# Patient Record
Sex: FEMALE | Race: WHITE | NOT HISPANIC OR LATINO | Employment: OTHER | URBAN - METROPOLITAN AREA
[De-identification: names, ages, dates, MRNs, and addresses within clinical notes are randomized per-mention and may not be internally consistent; named-entity substitution may affect disease eponyms.]

---

## 2017-08-02 ENCOUNTER — TELEPHONE (OUTPATIENT)
Dept: PRIMARY CARE CLINIC | Facility: CLINIC | Age: 67
End: 2017-08-02

## 2017-08-02 DIAGNOSIS — N39.0 URINARY TRACT INFECTION WITHOUT HEMATURIA, SITE UNSPECIFIED: Primary | ICD-10-CM

## 2017-08-02 NOTE — TELEPHONE ENCOUNTER
----- Message from Keyonna Garcia sent at 8/2/2017  2:53 PM CDT -----  Contact: self [phone]  Patient needs an appointment this week for uti.  Thanks!

## 2017-08-02 NOTE — TELEPHONE ENCOUNTER
No available appointment for this week would you like us to double book an appointment or can you order a UA

## 2017-08-03 NOTE — TELEPHONE ENCOUNTER
----- Message from Bruna Mcnamara sent at 8/3/2017  3:18 PM CDT -----  Contact: self  Patient [phone] is calling to check the status of her urinalysis and asking if a medication has been called to her pharmacy/please advise

## 2017-08-03 NOTE — TELEPHONE ENCOUNTER
Spoke with pt, notified her to go to lab at hospital for u/a and if she has a uti dr. rincon will call her in meds. States her understanding.

## 2017-08-03 NOTE — TELEPHONE ENCOUNTER
Called lab at [facility], waiting to receive lab results of u/a through fax.

## 2017-08-04 ENCOUNTER — TELEPHONE (OUTPATIENT)
Dept: PRIMARY CARE CLINIC | Facility: CLINIC | Age: 67
End: 2017-08-04

## 2017-08-04 RX ORDER — SULFAMETHOXAZOLE AND TRIMETHOPRIM 800; 160 MG/1; MG/1
1 TABLET ORAL 2 TIMES DAILY
COMMUNITY
End: 2017-08-04 | Stop reason: DRUGHIGH

## 2017-08-04 RX ORDER — SULFAMETHOXAZOLE AND TRIMETHOPRIM 800; 160 MG/1; MG/1
1 TABLET ORAL 2 TIMES DAILY
Qty: 20 TABLET | Refills: 0 | Status: SHIPPED | OUTPATIENT
Start: 2017-08-04 | End: 2017-12-12

## 2017-08-04 NOTE — TELEPHONE ENCOUNTER
Received lab results from [facility], Pt has uti per Dr. Meehan. Bactrim DS sent to pharmacy. To repeat urine in 2 weeks. Notified pt, states her understanding

## 2017-08-04 NOTE — TELEPHONE ENCOUNTER
----- Message from Sena Mccoy sent at 8/3/2017  9:41 AM CDT -----  Contact: Patient  Alexandria, patient [phone], Second call for patient, possible UTI, wanting same day appointment. Pressure, burning and odor.  Please advise. Thanks.      [facility]/pharmacy #[id number] - [place] - [address]  [address]  [place] [address]  Phone: [phone] Fax: [phone]    
Duplicate messages, pt was sent to [facility] for UA ,UA reviewed by Dr Meehan and meds sent to pharm ref to previous message   
abnormal/expand...

## 2017-08-04 NOTE — TELEPHONE ENCOUNTER
----- Message from Keyonna Garcia sent at 8/2/2017  2:53 PM CDT -----  Contact: self [phone]  Patient needs an appointment this week for uti.  Thanks!

## 2017-08-16 ENCOUNTER — TELEPHONE (OUTPATIENT)
Dept: PRIMARY CARE CLINIC | Facility: CLINIC | Age: 67
End: 2017-08-16

## 2017-08-16 NOTE — TELEPHONE ENCOUNTER
Spoke with pt, advised her I will pt a new order at  to be picked up. States her understanding

## 2017-08-16 NOTE — TELEPHONE ENCOUNTER
----- Message from Teresa Russell sent at 8/16/2017  8:27 AM CDT -----  Patient lost her orders for a urinalysis, she is requesting a new one  Contact patient  [phone].    Thank you

## 2017-08-28 NOTE — TELEPHONE ENCOUNTER
----- Message from Kingston Kramer sent at 8/28/2017  2:20 PM CDT -----  Contact: pt  Pt is requesting a refill on her Lisinopril   Call Back#[phone]  Thanks    [facility]/pharmacy #[id number] - [place] - [address]  [address] [place] [address]  Phone: [phone] Fax: [phone]

## 2017-08-29 RX ORDER — LISINOPRIL 20 MG/1
20 TABLET ORAL DAILY
Qty: 30 TABLET | Refills: 5 | Status: SHIPPED | OUTPATIENT
Start: 2017-08-29 | End: 2017-09-06 | Stop reason: SDUPTHER

## 2017-08-29 NOTE — TELEPHONE ENCOUNTER
Call tell last lab done 8-31-16 , last seen 3-7-17 Do lab CBC,CMP,lipid T4,TSH U/A do lab 3 mo see me 2 weeks later review lab get in EPIC

## 2017-09-07 RX ORDER — LISINOPRIL 20 MG/1
TABLET ORAL
Qty: 30 TABLET | Refills: 2 | Status: SHIPPED | OUTPATIENT
Start: 2017-09-07 | End: 2018-01-15 | Stop reason: SDUPTHER

## 2017-09-07 NOTE — TELEPHONE ENCOUNTER
Call tell last lab 8-31-16 needs do lab CBC,CMP,Lipid,TSH,U/A do 2-3 mo see me 2 weeks later go over lab get in EPIC

## 2017-12-12 ENCOUNTER — OFFICE VISIT (OUTPATIENT)
Dept: PRIMARY CARE CLINIC | Facility: CLINIC | Age: 67
End: 2017-12-12
Payer: COMMERCIAL

## 2017-12-12 VITALS
HEIGHT: 67 IN | TEMPERATURE: 98 F | BODY MASS INDEX: 19.78 KG/M2 | DIASTOLIC BLOOD PRESSURE: 80 MMHG | HEART RATE: 71 BPM | RESPIRATION RATE: 18 BRPM | OXYGEN SATURATION: 96 % | SYSTOLIC BLOOD PRESSURE: 148 MMHG | WEIGHT: 126 LBS

## 2017-12-12 DIAGNOSIS — M41.9 SCOLIOSIS, UNSPECIFIED SCOLIOSIS TYPE, UNSPECIFIED SPINAL REGION: ICD-10-CM

## 2017-12-12 DIAGNOSIS — S39.012A LUMBOSACRAL STRAIN, INITIAL ENCOUNTER: Primary | ICD-10-CM

## 2017-12-12 DIAGNOSIS — I10 HTN (HYPERTENSION), BENIGN: ICD-10-CM

## 2017-12-12 DIAGNOSIS — M54.30 SCIATICA, UNSPECIFIED LATERALITY: ICD-10-CM

## 2017-12-12 PROCEDURE — 99213 OFFICE O/P EST LOW 20 MIN: CPT | Mod: 25,S$GLB,, | Performed by: FAMILY MEDICINE

## 2017-12-12 PROCEDURE — 96372 THER/PROPH/DIAG INJ SC/IM: CPT | Mod: S$GLB,,, | Performed by: FAMILY MEDICINE

## 2017-12-12 PROCEDURE — 99999 PR PBB SHADOW E&M-EST. PATIENT-LVL IV: CPT | Mod: PBBFAC,,, | Performed by: FAMILY MEDICINE

## 2017-12-12 RX ORDER — FAMOTIDINE 10 MG/1
10 TABLET ORAL 2 TIMES DAILY
COMMUNITY
End: 2018-04-23 | Stop reason: ALTCHOICE

## 2017-12-12 RX ORDER — OXYBUTYNIN CHLORIDE 10 MG/1
10 TABLET, EXTENDED RELEASE ORAL DAILY
COMMUNITY
Start: 2017-11-27 | End: 2018-01-15 | Stop reason: SDUPTHER

## 2017-12-12 RX ORDER — METHYLPREDNISOLONE 4 MG/1
TABLET ORAL
Qty: 1 PACKAGE | Refills: 0 | Status: SHIPPED | OUTPATIENT
Start: 2017-12-12 | End: 2017-12-28

## 2017-12-12 RX ORDER — HYOSCYAMINE SULFATE 0.125 MG
125 TABLET ORAL EVERY 4 HOURS PRN
COMMUNITY
End: 2018-04-23 | Stop reason: ALTCHOICE

## 2017-12-12 RX ORDER — DICLOFENAC SODIUM 75 MG/1
75 TABLET, DELAYED RELEASE ORAL 2 TIMES DAILY
Qty: 30 TABLET | Refills: 5 | Status: SHIPPED | OUTPATIENT
Start: 2017-12-12 | End: 2018-07-19

## 2017-12-12 RX ORDER — HYDROCODONE BITARTRATE AND ACETAMINOPHEN 5; 325 MG/1; MG/1
1 TABLET ORAL EVERY 6 HOURS PRN
Qty: 30 TABLET | Refills: 0 | Status: SHIPPED | OUTPATIENT
Start: 2017-12-12 | End: 2017-12-28 | Stop reason: SDUPTHER

## 2017-12-12 RX ORDER — BETAMETHASONE SODIUM PHOSPHATE AND BETAMETHASONE ACETATE 3; 3 MG/ML; MG/ML
12 INJECTION, SUSPENSION INTRA-ARTICULAR; INTRALESIONAL; INTRAMUSCULAR; SOFT TISSUE
Status: COMPLETED | OUTPATIENT
Start: 2017-12-12 | End: 2017-12-12

## 2017-12-12 RX ADMIN — BETAMETHASONE SODIUM PHOSPHATE AND BETAMETHASONE ACETATE 12 MG: 3; 3 INJECTION, SUSPENSION INTRA-ARTICULAR; INTRALESIONAL; INTRAMUSCULAR; SOFT TISSUE at 01:12

## 2017-12-12 NOTE — PROGRESS NOTES
Subjective:       Patient ID: Alexandria Estrada is a 67 y.o. female.    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  Moved to table last Thursday about 10-11 days.  Pain ever since--increased bending and squatting.  History of bad scoliosis hazily has some pain.  History of unhealed fracture 3 years ago fell on left knee and had a hematoma 2 years    ROS:  Skin: no psoriasis, eczema, skin cancer  HEENT: No headache, ocular pain, blurred vision, diplopia, epistaxis, hoarseness change in voice, thyroid trouble  Lung: No pneumonia, asthma, Tb, wheezing, SOB, history of mucus in the throat for approximately one year   Heart: No chest pain, ankle edema, palpitations, MI, elaine murmur,+ hypertension,no hyperlipidemia  Abdomen: No nausea, vomiting, diarrhea, constipation, ulcers, hepatitis, gallbladder disease, melena, hematochezia, hematemesis  : no UTI, renal disease,+  stones  GYN neg   MS: See history of present illness   Neuro: No dizziness, LOC, seizures   No diabetes, no anemia, no anxiety, no depression     Objective:   Physical Exam:  General: Well nourished, well developed, no acute distress severe scoliosis pain with any movement side to side up and down comfortable with sitting still   Skin: No lesions  HEENT: Eyes PERRLA, EOM intact, nose patent, throat non-erythematous   NECK: Supple, no bruits, No JVD, no nodes  Lungs: Clear, no rales, rhonchi, wheezing  Heart: Regular rate and rhythm, no murmurs, gallops, or rubs  Abdomen: flat, bowel sounds positive, no tenderness, or organomegaly  MS: Tenderness lumbar spine L1-S1 on the right and SI area pain with anterior flexion 20° extension 10° lateral flexion and rotation 10° severe scoliosis  Neuro: Alert, CN intact, oriented X 3  Extremities: No cyanosis, clubbing, or edema         Assessment:       1. Lumbosacral strain, initial  encounter    2. Sciatica, unspecified laterality    3. HTN (hypertension), benign    4. Scoliosis, unspecified scoliosis type, unspecified spinal region        Plan:       Lumbosacral strain, initial encounter    Sciatica, unspecified laterality    HTN (hypertension), benign    Scoliosis, unspecified scoliosis type, unspecified spinal region    Other orders  -     betamethasone acetate-betamethasone sodium phosphate injection 12 mg; Inject 2 mLs (12 mg total) into the muscle one time.  -     hydrocodone-acetaminophen 5-325mg (NORCO) 5-325 mg per tablet; Take 1 tablet by mouth every 6 (six) hours as needed.  Dispense: 30 tablet; Refill: 0  -     methylPREDNISolone (MEDROL DOSEPACK) 4 mg tablet; use as directed  Dispense: 1 Package; Refill: 0  -     diclofenac (VOLTAREN) 75 MG EC tablet; Take 1 tablet (75 mg total) by mouth 2 (two) times daily.  Dispense: 30 tablet; Refill: 5      moist heat/Theragesic/range of motion exercise/Celestone/norco/Medrol Dosepak/diclofenac--if needed and Flexeril 5 mg  X-ray lumbar spine hip pain stays needs MRI is severe scoliosis may need to see orthopedist or neurosurgeon more like if pain persists

## 2017-12-28 ENCOUNTER — OFFICE VISIT (OUTPATIENT)
Dept: PRIMARY CARE CLINIC | Facility: CLINIC | Age: 67
End: 2017-12-28
Payer: COMMERCIAL

## 2017-12-28 VITALS
WEIGHT: 129 LBS | TEMPERATURE: 98 F | HEART RATE: 70 BPM | OXYGEN SATURATION: 97 % | RESPIRATION RATE: 18 BRPM | BODY MASS INDEX: 20.25 KG/M2 | SYSTOLIC BLOOD PRESSURE: 137 MMHG | HEIGHT: 67 IN | DIASTOLIC BLOOD PRESSURE: 84 MMHG

## 2017-12-28 DIAGNOSIS — M54.30 SCIATICA, UNSPECIFIED LATERALITY: ICD-10-CM

## 2017-12-28 DIAGNOSIS — I10 HTN (HYPERTENSION), BENIGN: ICD-10-CM

## 2017-12-28 DIAGNOSIS — M41.9 SCOLIOSIS, UNSPECIFIED SCOLIOSIS TYPE, UNSPECIFIED SPINAL REGION: ICD-10-CM

## 2017-12-28 DIAGNOSIS — S39.012A LUMBOSACRAL STRAIN, INITIAL ENCOUNTER: Primary | ICD-10-CM

## 2017-12-28 PROCEDURE — 99213 OFFICE O/P EST LOW 20 MIN: CPT | Mod: S$GLB,,, | Performed by: FAMILY MEDICINE

## 2017-12-28 PROCEDURE — 99999 PR PBB SHADOW E&M-EST. PATIENT-LVL IV: CPT | Mod: PBBFAC,,, | Performed by: FAMILY MEDICINE

## 2017-12-28 RX ORDER — HYDROCODONE BITARTRATE AND ACETAMINOPHEN 5; 325 MG/1; MG/1
1 TABLET ORAL EVERY 12 HOURS PRN
Qty: 30 TABLET | Refills: 0 | Status: SHIPPED | OUTPATIENT
Start: 2017-12-28 | End: 2018-04-23 | Stop reason: ALTCHOICE

## 2017-12-28 RX ORDER — CYCLOBENZAPRINE HCL 10 MG
10 TABLET ORAL 3 TIMES DAILY PRN
Qty: 30 TABLET | Refills: 5 | Status: SHIPPED | OUTPATIENT
Start: 2017-12-28 | End: 2018-01-07

## 2017-12-28 RX ORDER — IBUPROFEN 600 MG/1
600 TABLET ORAL EVERY 6 HOURS PRN
Qty: 100 TABLET | Refills: 2 | Status: SHIPPED | OUTPATIENT
Start: 2017-12-28 | End: 2018-12-21

## 2017-12-28 NOTE — PROGRESS NOTES
Subjective:       Patient ID: Alexandria Estrada is a 67 y.o. female.    Chief Complaint: Follow-up (Back pain)    HPI: 67-year-old white female in for back pain--- started right before Tessy--patient was moving a table--pain started immediately.  Was given pain medications but no significant relief, Celestone, Medrol pack and hydrocodone--no NSAIDs or muscle relaxant.  No x-ray taken pain worse is first gets up in the morning almost unable to move.  As takes a pain pill able to go through the day.  Pain in the hands left squad surf reaches far with right arm pain in the sacroiliac and lumbosacral area on the right    ROS:  Skin: no psoriasis, eczema, skin cancer  HEENT: No headache, ocular pain, blurred vision, diplopia, epistaxis, hoarseness change in voice, thyroid trouble  Lung: No pneumonia, asthma, Tb, wheezing, SOB,  Heart: No chest pain, ankle edema, palpitations, MI, elaine murmur,+ hypertension,no  hyperlipidemia  Abdomen: No nausea, vomiting, diarrhea, constipation, ulcers, hepatitis, gallbladder disease, melena, hematochezia, hematemesis  : no UTI, renal disease, stones + overactive bladder  GYN mammogram last year due to this year had Pap smear 2 years ago  MS: + fracture---crushed left heel and injured left knee--falling holding her baby while walking down steps--4 years ago,+ O/A does have intermittent issues with knee, no  lupus, rheumatoid, gout  Neuro: No dizziness, LOC, seizures   No diabetes, no anemia, no anxiety, no depression     Objective:   Physical Exam:  General: Well nourished, well developed, no acute distress severe kyphoscolisos   Skin: No lesions  HEENT: Eyes PERRLA, EOM intact, nose patent, throat non-erythematous   NECK: Supple, no bruits, No JVD, no nodes  Lungs: Clear, no rales, rhonchi, wheezing  Heart: Regular rate and rhythm, no murmurs, gallops, or rubs  Abdomen: flat, bowel sounds positive, no tenderness, or organomegaly  MS: signif scoliosis -- pain right L1-S1 and  right SI joint , sore ant flex 10, ext 10, lat flex and rotation 10, SLL pulling, painful palpation. ---knees sore m right heel sore   Neuro: Alert, CN intact, oriented X 3  Extremities: No cyanosis, clubbing, or edema         Assessment:       1. Lumbosacral strain, initial encounter    2. Sciatica, unspecified laterality    3. Scoliosis, unspecified scoliosis type, unspecified spinal region    4. HTN (hypertension), benign        Plan:       Lumbosacral strain, initial encounter  -     X-Ray Lumbar Spine Complete 5 View; Future; Expected date: 12/28/2017    Sciatica, unspecified laterality  -     X-Ray Lumbar Spine Complete 5 View; Future; Expected date: 12/28/2017    Scoliosis, unspecified scoliosis type, unspecified spinal region  -     X-Ray Lumbar Spine Complete 5 View; Future; Expected date: 12/28/2017    HTN (hypertension), benign    Other orders  -     hydrocodone-acetaminophen 5-325mg (NORCO) 5-325 mg per tablet; Take 1 tablet by mouth every 12 (twelve) hours as needed.  Dispense: 30 tablet; Refill: 0  -     ibuprofen (ADVIL,MOTRIN) 600 MG tablet; Take 1 tablet (600 mg total) by mouth every 6 (six) hours as needed for Pain.  Dispense: 100 tablet; Refill: 2  -     cyclobenzaprine (FLEXERIL) 10 MG tablet; Take 1 tablet (10 mg total) by mouth 3 (three) times daily as needed for Muscle spasms.  Dispense: 30 tablet; Refill: 5      moist heat, Thera-Gesic, range of motion exercise, Motrin 601 by mouth every 6 hours when necessary pain, Flexeril 10 mg 1 by mouth every 8 hours p.m. muscle spasm can use daily at bedtime only if to sleepy  X-ray lumbar spine if abnormal consider MRI lumbar spine and consult with either orthopedics or neurosurgeon  Use hydrocodone only when necessary half to 1 by mouth twice a day  Will consider orthopedic consult/physical therapy with range of motion exercises and MRI lumbar spine not better in 1 month

## 2018-01-03 ENCOUNTER — TELEPHONE (OUTPATIENT)
Dept: PRIMARY CARE CLINIC | Facility: CLINIC | Age: 68
End: 2018-01-03

## 2018-01-03 NOTE — TELEPHONE ENCOUNTER
Spoke to pharmacy and verified that ICD10 code was lumbosacral strain  S39.012A per office visit in patient's chart that rx was prescribed in.

## 2018-01-03 NOTE — TELEPHONE ENCOUNTER
----- Message from Sena Mccoy sent at 1/3/2018 10:54 AM CST -----  Contact: Philomena with [employer] phone [phone]  Philomena with [employer] phone [phone], Second time calling for an ICD10 diagnosis code for Rx hydrocodone-acetaminophen 5-325mg (NORCO) 5-325 mg per tablet 30 tablet . Please advise. Thanks.

## 2018-01-04 ENCOUNTER — TELEPHONE (OUTPATIENT)
Dept: PRIMARY CARE CLINIC | Facility: CLINIC | Age: 68
End: 2018-01-04

## 2018-01-04 NOTE — TELEPHONE ENCOUNTER
Pharmacy needs the diagnosis code changed due to her Norco being for a 30 day supply. Please change. Thank you.

## 2018-01-15 ENCOUNTER — OFFICE VISIT (OUTPATIENT)
Dept: PRIMARY CARE CLINIC | Facility: CLINIC | Age: 68
End: 2018-01-15
Payer: MEDICARE

## 2018-01-15 VITALS
HEART RATE: 79 BPM | RESPIRATION RATE: 18 BRPM | DIASTOLIC BLOOD PRESSURE: 83 MMHG | SYSTOLIC BLOOD PRESSURE: 118 MMHG | TEMPERATURE: 98 F | OXYGEN SATURATION: 97 %

## 2018-01-15 DIAGNOSIS — M20.10 ACQUIRED HALLUX VALGUS, UNSPECIFIED LATERALITY: ICD-10-CM

## 2018-01-15 DIAGNOSIS — I10 HTN (HYPERTENSION), BENIGN: ICD-10-CM

## 2018-01-15 DIAGNOSIS — M54.30 SCIATICA, UNSPECIFIED LATERALITY: ICD-10-CM

## 2018-01-15 DIAGNOSIS — M41.45 NEUROMUSCULAR SCOLIOSIS OF THORACOLUMBAR REGION: ICD-10-CM

## 2018-01-15 DIAGNOSIS — M41.9 SCOLIOSIS, UNSPECIFIED SCOLIOSIS TYPE, UNSPECIFIED SPINAL REGION: Primary | ICD-10-CM

## 2018-01-15 DIAGNOSIS — S39.012A LUMBOSACRAL STRAIN, INITIAL ENCOUNTER: ICD-10-CM

## 2018-01-15 PROCEDURE — 99999 PR PBB SHADOW E&M-EST. PATIENT-LVL III: CPT | Mod: PBBFAC,,, | Performed by: FAMILY MEDICINE

## 2018-01-15 PROCEDURE — 99213 OFFICE O/P EST LOW 20 MIN: CPT | Mod: S$GLB,,, | Performed by: FAMILY MEDICINE

## 2018-01-15 RX ORDER — LISINOPRIL 20 MG/1
20 TABLET ORAL DAILY
Qty: 90 TABLET | Refills: 2 | Status: SHIPPED | OUTPATIENT
Start: 2018-01-15 | End: 2018-04-23 | Stop reason: ALTCHOICE

## 2018-01-15 RX ORDER — OXYBUTYNIN CHLORIDE 10 MG/1
10 TABLET, EXTENDED RELEASE ORAL DAILY
Qty: 90 TABLET | Refills: 2 | Status: SHIPPED | OUTPATIENT
Start: 2018-01-15 | End: 2018-10-15 | Stop reason: SDUPTHER

## 2018-01-15 NOTE — PROGRESS NOTES
Subjective:       Patient ID: Alexandria Estrada is a 67 y.o. female.    Chief Complaint: Follow-up (wellness visit )    HPI: 66 yo WF in for results-- XraY LS spine Severe thoracolumbar scoliosis with spondylosis     ROS:  Skin: no psoriasis, eczema,+ hx  skin cancer 6 month ago burned off   HEENT: No headache, ocular pain, blurred vision, diplopia, epistaxis, hoarseness change in voice, thyroid trouble  Lung: No pneumonia, asthma, Tb, wheezing, SOB,no smoking   Heart: No chest pain, ankle edema, palpitations, MI, elaine murmur,+ hypertension,no  hyperlipidemia  Abdomen: No nausea, vomiting, diarrhea, constipation, ulcers, hepatitis, gallbladder disease, melena, hematochezia, hematemesis  : no UTI, renal disease, +stones past  GYN mammogram due now, next week PAP  MS: no fractures,+O/A with severe scoliosis, no  lupus, rheumatoid, gout + hallux valgus some callouses    Neuro: No dizziness, LOC, seizures   No diabetes, no anemia, no anxiety, no depression vv    Objective:   Physical Exam:  General: Well nourished, well developed, no acute distress + thin severe scoliosis   Skin: No lesions  HEENT: Eyes PERRLA, EOM intact, nose patent, throat non-erythematous   NECK: Supple, no bruits, No JVD, no nodes  Lungs: Clear, no rales, rhonchi, wheezing  Heart: Regular rate and rhythm, no murmurs, gallops, or rubs  Abdomen: flat, bowel sounds positive, no tenderness, or organomegaly  Ms: severe thoracoscolisos sore palaption , sore with lat flex and rotstion, Apin leander left flank   Neuro: Alert, CN intact, oriented X 3  Extremities: No cyanosis, clubbing, or edema         Assessment:       1. Scoliosis, unspecified scoliosis type, unspecified spinal region    2. Neuromuscular scoliosis of thoracolumbar region    3. Lumbosacral strain, initial encounter    4. Sciatica, unspecified laterality    5. HTN (hypertension), benign    6. Acquired hallux valgus, unspecified laterality        Plan:       Scoliosis, unspecified scoliosis  type, unspecified spinal region  -     MRI Lumbar Spine Without Contrast; Future; Expected date: 01/15/2018  -     MRI Thoracic Spine Without Contrast; Future; Expected date: 01/15/2018    Neuromuscular scoliosis of thoracolumbar region    Lumbosacral strain, initial encounter  -     MRI Lumbar Spine Without Contrast; Future; Expected date: 01/15/2018  -     MRI Thoracic Spine Without Contrast; Future; Expected date: 01/15/2018    Sciatica, unspecified laterality  -     MRI Lumbar Spine Without Contrast; Future; Expected date: 01/15/2018  -     MRI Thoracic Spine Without Contrast; Future; Expected date: 01/15/2018    HTN (hypertension), benign  -     CBC auto differential; Future; Expected date: 07/15/2018  -     Comprehensive metabolic panel; Future; Expected date: 07/15/2018  -     Lipid panel; Future; Expected date: 07/15/2018  -     TSH; Future; Expected date: 07/15/2018  -     T4, free; Future; Expected date: 07/15/2018  -     Urinalysis  -     X-Ray Chest PA And Lateral; Future; Expected date: 01/15/2018  -     EKG 12-lead; Future    Acquired hallux valgus, unspecified laterality    Other orders  -     oxybutynin (DITROPAN-XL) 10 MG 24 hr tablet; Take 1 tablet (10 mg total) by mouth once daily.  Dispense: 90 tablet; Refill: 2  -     lisinopril (PRINIVIL,ZESTRIL) 20 MG tablet; Take 1 tablet (20 mg total) by mouth once daily.  Dispense: 90 tablet; Refill: 2      MRI lumbar and thoracic spines-- severe thoracoc lumbar scoliosis -- spondylosis , pain left flank, radicular pain left leg , occas left leg weakness, slight decreased reflexes left, increased SOB due scoliosis . Then see neurosurgeon       Needs lab in 6 mo see me 2 weeks later Due back issues see me 3 mo check back   Moist heat, Theragesic,rOM exercises NSAID's pain meds prn

## 2018-02-14 ENCOUNTER — TELEPHONE (OUTPATIENT)
Dept: PRIMARY CARE CLINIC | Facility: CLINIC | Age: 68
End: 2018-02-14

## 2018-02-22 ENCOUNTER — TELEPHONE (OUTPATIENT)
Dept: PRIMARY CARE CLINIC | Facility: CLINIC | Age: 68
End: 2018-02-22

## 2018-02-22 NOTE — TELEPHONE ENCOUNTER
----- Message from RT Brea sent at 2/22/2018 11:41 AM CST -----  Contact: pt    pt , requesting to inform [facility] have not received the referral as of yet, please re fax to , thanks.

## 2018-02-28 ENCOUNTER — TELEPHONE (OUTPATIENT)
Dept: PRIMARY CARE CLINIC | Facility: CLINIC | Age: 68
End: 2018-02-28

## 2018-02-28 NOTE — TELEPHONE ENCOUNTER
----- Message from Teresa Story sent at 2/26/2018 12:47 PM CST -----  Patient requesting copy of her two recent MRI results/please call back at [phone] to advise.

## 2018-03-04 NOTE — TELEPHONE ENCOUNTER
----- Message from Sena Mccoy sent at 2/14/2018 10:48 AM CST -----  Contact: Patient  Alexandria, patient [phone], Calling for a referral for Dr. Lalo Magana, Neurologist, fax [phone], for scoliosis and arthritis. Please advise. Thanks.   No

## 2018-03-07 ENCOUNTER — TELEPHONE (OUTPATIENT)
Dept: PRIMARY CARE CLINIC | Facility: CLINIC | Age: 68
End: 2018-03-07

## 2018-03-07 NOTE — TELEPHONE ENCOUNTER
----- Message from Cheyenne Malik sent at 3/7/2018  9:50 AM CST -----  Contact: self   Patient called regarding a referral, she want to know if she can come pick it up. Please call back at [phone]

## 2018-03-15 ENCOUNTER — TELEPHONE (OUTPATIENT)
Dept: PRIMARY CARE CLINIC | Facility: CLINIC | Age: 68
End: 2018-03-15

## 2018-03-15 NOTE — TELEPHONE ENCOUNTER
----- Message from Santi WITT Moraima sent at 3/15/2018  9:24 AM CDT -----  Contact: same  Patient called in and stated she needs to get a copy of the report for her MRI she had done on 12/28/17.  Patient stated it was done at the hospital but just needs a copy of report left at  so she can pick it up.  Patient would like a call back when ready at [phone]

## 2018-03-20 RX ORDER — LISINOPRIL 20 MG/1
20 TABLET ORAL DAILY
Qty: 30 TABLET | Refills: 5 | Status: SHIPPED | OUTPATIENT
Start: 2018-03-20 | End: 2018-11-04 | Stop reason: SDUPTHER

## 2018-04-13 ENCOUNTER — TELEPHONE (OUTPATIENT)
Dept: PRIMARY CARE CLINIC | Facility: CLINIC | Age: 68
End: 2018-04-13

## 2018-04-13 ENCOUNTER — TELEPHONE (OUTPATIENT)
Dept: OBSTETRICS AND GYNECOLOGY | Facility: CLINIC | Age: 68
End: 2018-04-13

## 2018-04-13 NOTE — TELEPHONE ENCOUNTER
Spoke with pt, states she is trying to get results on blood work that she did on 4/10. States another doctor ordered the test. Advised her she will have to call that doctor to get the results. States her understanding. No further issues discussed.

## 2018-04-13 NOTE — TELEPHONE ENCOUNTER
----- Message from Bobbi Arias LPN sent at 4/13/2018  2:34 PM CDT -----  Contact: MIKEY INGRAM [[id number]]  Call Pt and tell her we don't do this.  Thanks     ----- Message -----  From: Wilberto Ocasio Jr., MD  Sent: 4/13/2018   2:16 PM  To: Bobbi Arias LPN    No we don't  ----- Message -----  From: Bobbi Arias LPN  Sent: 4/13/2018   1:12 PM  To: Wilberto Ocasio Jr., MD    Do you write this??    ----- Message -----  From: Tremayne Cunningham  Sent: 4/13/2018   1:01 PM  To: Amarjit MCGRAW Jr Staff    Name of Who is Calling: MIKEY INGRAM [[id number]]      What is the request in detail: Patient would like to speak with staff in regards to knowing if any of the [facility] doctors do prolia injection      Can the clinic reply by [facility]: no      [facility]: [phone]

## 2018-04-13 NOTE — TELEPHONE ENCOUNTER
----- Message from Sena Mccoy sent at 4/13/2018  1:14 PM CDT -----  Contact: Patient  Alexandria, patient [phone], Calling for test results, done Tuesday. Please advise. Thanks.

## 2018-04-13 NOTE — TELEPHONE ENCOUNTER
Spoke with pt and informed her that we do not do those injections here per Dr Ocasio. Pt verbalized understanding. No further questions or concerns.

## 2018-04-23 ENCOUNTER — OFFICE VISIT (OUTPATIENT)
Dept: PRIMARY CARE CLINIC | Facility: CLINIC | Age: 68
End: 2018-04-23
Payer: MEDICARE

## 2018-04-23 VITALS
SYSTOLIC BLOOD PRESSURE: 130 MMHG | HEART RATE: 75 BPM | HEIGHT: 67 IN | WEIGHT: 124.69 LBS | DIASTOLIC BLOOD PRESSURE: 79 MMHG | RESPIRATION RATE: 18 BRPM | TEMPERATURE: 98 F | BODY MASS INDEX: 19.57 KG/M2 | OXYGEN SATURATION: 98 %

## 2018-04-23 DIAGNOSIS — I10 HTN (HYPERTENSION), BENIGN: ICD-10-CM

## 2018-04-23 DIAGNOSIS — S39.012A LUMBOSACRAL STRAIN, INITIAL ENCOUNTER: ICD-10-CM

## 2018-04-23 DIAGNOSIS — M54.30 SCIATICA, UNSPECIFIED LATERALITY: ICD-10-CM

## 2018-04-23 DIAGNOSIS — M41.9 SCOLIOSIS, UNSPECIFIED SCOLIOSIS TYPE, UNSPECIFIED SPINAL REGION: ICD-10-CM

## 2018-04-23 DIAGNOSIS — N39.0 URINARY TRACT INFECTION WITHOUT HEMATURIA, SITE UNSPECIFIED: Primary | ICD-10-CM

## 2018-04-23 PROCEDURE — 99213 OFFICE O/P EST LOW 20 MIN: CPT | Mod: S$GLB,,, | Performed by: FAMILY MEDICINE

## 2018-04-23 PROCEDURE — 3078F DIAST BP <80 MM HG: CPT | Mod: S$GLB,,, | Performed by: FAMILY MEDICINE

## 2018-04-23 PROCEDURE — 3075F SYST BP GE 130 - 139MM HG: CPT | Mod: S$GLB,,, | Performed by: FAMILY MEDICINE

## 2018-04-23 PROCEDURE — 99999 PR PBB SHADOW E&M-EST. PATIENT-LVL III: CPT | Mod: PBBFAC,,, | Performed by: FAMILY MEDICINE

## 2018-04-23 RX ORDER — NITROFURANTOIN 25; 75 MG/1; MG/1
100 CAPSULE ORAL 2 TIMES DAILY
Qty: 14 CAPSULE | Refills: 0 | Status: SHIPPED | OUTPATIENT
Start: 2018-04-23 | End: 2018-12-21

## 2018-04-23 NOTE — PROGRESS NOTES
Subjective:       Patient ID: Alexandria Estrada is a 68 y.o. female.    Chief Complaint: No chief complaint on file.    HPI: 68-year-old white female complaining of UTIs since Friday + frequency, + urgency no retention + dysuria + odor + pyuria not sure if has blood in the urine or if discoloration secondary to cranberry juice.  History of UTIs once last year no vaginal discharge + history of renal stones in the past+    ROS:  Skin: no psoriasis, eczema, skin cancer  HEENT: + headache, ocular pain, blurred vision, diplopia, epistaxis, hoarseness change in voice, thyroid trouble  Lung: No pneumonia, asthma, Tb, wheezing, SOB,  Heart: No chest pain, ankle edema, palpitations, MI, elaine murmur, +hypertension,no hyperlipidemia  Abdomen: No nausea, +vomiting-last night thinks secondary to eating some bad pains, diarrhea, constipation, ulcers, hepatitis, gallbladder disease, melena, hematochezia, hematemesis  : See history of present illness  MS: no fractures, + O/A--needs hands severe scoliosis lumbar spine,no lupus, rheumatoid, gout  Neuro: No dizziness, LOC, seizures   No diabetes, no anemia, no anxiety, no depression     Objective:   Physical Exam:  General: Well nourished, well developed, no acute distress  Skin: No lesions  HEENT: Eyes PERRLA, EOM intact, nose patent, throat non-erythematous   NECK: Supple, no bruits, No JVD, no nodes  Lungs: Clear, no rales, rhonchi, wheezing  Heart: Regular rate and rhythm, no murmurs, gallops, or rubs  Abdomen: flat, bowel sounds positive, no tenderness, or organomegaly  MS: Severe scoliosis of the lumbar spine and thoracic spine  Neuro: Alert, CN intact, oriented X 3  Extremities: No cyanosis, clubbing, or edema         Assessment:       1. Urinary tract infection without hematuria, site unspecified    2. HTN (hypertension), benign    3. Lumbosacral strain, initial encounter    4. Sciatica, unspecified laterality    5. Scoliosis, unspecified scoliosis type, unspecified spinal  region        Plan:       Urinary tract infection without hematuria, site unspecified  -     POCT URINE DIPSTICK WITHOUT MICROSCOPE    HTN (hypertension), benign    Lumbosacral strain, initial encounter    Sciatica, unspecified laterality    Scoliosis, unspecified scoliosis type, unspecified spinal region    Other orders  -     nitrofurantoin, macrocrystal-monohydrate, (MACROBID) 100 MG capsule; Take 1 capsule (100 mg total) by mouth 2 (two) times daily.  Dispense: 14 capsule; Refill: 0      patient told to return in 2 weeks and give us a urine be sure UTI is cleared had blood in the urine nitrates and white cells  Needs lab as listed in the computer January 2018 that was never done

## 2018-04-24 PROBLEM — N39.0 URINARY TRACT INFECTION WITHOUT HEMATURIA: Status: ACTIVE | Noted: 2018-04-24

## 2018-07-19 ENCOUNTER — OFFICE VISIT (OUTPATIENT)
Dept: OBSTETRICS AND GYNECOLOGY | Facility: CLINIC | Age: 68
End: 2018-07-19
Payer: MEDICARE

## 2018-07-19 VITALS
WEIGHT: 125.13 LBS | HEIGHT: 67 IN | BODY MASS INDEX: 19.64 KG/M2 | DIASTOLIC BLOOD PRESSURE: 84 MMHG | SYSTOLIC BLOOD PRESSURE: 142 MMHG

## 2018-07-19 DIAGNOSIS — N76.4 VULVAR BOIL: Primary | ICD-10-CM

## 2018-07-19 PROCEDURE — 99214 OFFICE O/P EST MOD 30 MIN: CPT | Mod: S$GLB,,, | Performed by: OBSTETRICS & GYNECOLOGY

## 2018-07-19 PROCEDURE — 99999 PR PBB SHADOW E&M-EST. PATIENT-LVL III: CPT | Mod: PBBFAC,,, | Performed by: OBSTETRICS & GYNECOLOGY

## 2018-07-19 PROCEDURE — 3077F SYST BP >= 140 MM HG: CPT | Mod: S$GLB,,, | Performed by: OBSTETRICS & GYNECOLOGY

## 2018-07-19 PROCEDURE — 3079F DIAST BP 80-89 MM HG: CPT | Mod: S$GLB,,, | Performed by: OBSTETRICS & GYNECOLOGY

## 2018-07-19 RX ORDER — SULFAMETHOXAZOLE AND TRIMETHOPRIM 800; 160 MG/1; MG/1
1 TABLET ORAL 2 TIMES DAILY
Qty: 20 TABLET | Refills: 0 | Status: SHIPPED | OUTPATIENT
Start: 2018-07-19 | End: 2018-12-21

## 2018-07-19 RX ORDER — ALENDRONATE SODIUM 70 MG/1
70 TABLET ORAL
COMMUNITY
Start: 2018-05-15 | End: 2018-12-21 | Stop reason: SDUPTHER

## 2018-07-19 RX ORDER — FAMOTIDINE 10 MG/1
10 TABLET ORAL 2 TIMES DAILY
COMMUNITY
End: 2018-12-21

## 2018-07-19 NOTE — PROGRESS NOTES
"Pt noticed a "bump" on vulva yesterday.  Initially not painful but now ttp and with working out.  CAN'T TELL IF IT'S DRAINING. NO FEVER OR CHILLS.    ROS:  GENERAL: No fever, chills, fatigability or weight loss.  VULVAR: No pain, no lesions and no itching.  VAGINAL: No relaxation, no itching, no discharge, no abnormal bleeding and no lesions.  ABDOMEN: No abdominal pain. Denies nausea. Denies vomiting. No diarrhea. No constipation  BREAST: Denies pain. No lumps. No discharge.  URINARY: No incontinence, no nocturia, no frequency and no dysuria.  CARDIOVASCULAR: No chest pain. No shortness of breath. No leg cramps.  NEUROLOGICAL: No headaches. No vision changes.  The remainder of the review of systems was negative.    PE:  General Appearance: normal weight And Well developed. Well nourished. In no acute distress.  Vulva: Lesions: YES. NO ERYTHEMA, INDURATION, INFECTION. DRAINAGE SPONTANEOUS WITH PUS  Urethral Meatus: Normal size. Normal location. No lesions. No prolapse.  Urethra: No masses. No tenderness. No prolapse. No scarring.  Bladder: No masses. No tenderness.  Vagina: Mucosa NI:yes discharge no, atrophy yes, cystocele no or rectocele no.  Cervix: Lesion: no  Stenotic: no Cervical motion tenderness: no  Uterus: Uterus size: 5 weeks. Support good. Uterus size: Normal  Adnexa: Masses: No Tenderness: No CDS Nodularity: No  Abdomen: normal weight No masses. No tenderness.  CHEST: CTA B  HEART: RRR  EXT: NO EDEMA            PROCEDURES:    PLAN:     DIAGNOSIS:  1. Vulvar boil        MEDICATIONS & ORDERS:  Orders Placed This Encounter    sulfamethoxazole-trimethoprim 800-160mg (BACTRIM DS) 800-160 mg Tab         FOLLOW-UP: With me in PRN   BUROW'S SOLUTION  INFECTION PREC.      "

## 2018-10-16 RX ORDER — OXYBUTYNIN CHLORIDE 10 MG/1
TABLET, EXTENDED RELEASE ORAL
Qty: 90 TABLET | Refills: 0 | Status: SHIPPED | OUTPATIENT
Start: 2018-10-16 | End: 2018-12-21 | Stop reason: SDUPTHER

## 2018-10-16 NOTE — TELEPHONE ENCOUNTER
Patient has never done lab since the new computer need CBCs CMP lipids T4 TSH UA chest x-ray EKG see me 2 weeks later OK 3 months refills give time to come and get seen get refills last refills without lab

## 2018-11-04 RX ORDER — LISINOPRIL 20 MG/1
TABLET ORAL
Qty: 90 TABLET | Refills: 1 | Status: SHIPPED | OUTPATIENT
Start: 2018-11-04 | End: 2018-12-21

## 2018-12-21 ENCOUNTER — OFFICE VISIT (OUTPATIENT)
Dept: PRIMARY CARE CLINIC | Facility: CLINIC | Age: 68
End: 2018-12-21
Payer: MEDICARE

## 2018-12-21 VITALS
HEIGHT: 67 IN | HEART RATE: 68 BPM | OXYGEN SATURATION: 98 % | RESPIRATION RATE: 18 BRPM | WEIGHT: 127 LBS | DIASTOLIC BLOOD PRESSURE: 94 MMHG | BODY MASS INDEX: 19.93 KG/M2 | SYSTOLIC BLOOD PRESSURE: 149 MMHG

## 2018-12-21 DIAGNOSIS — I10 HTN (HYPERTENSION), BENIGN: ICD-10-CM

## 2018-12-21 DIAGNOSIS — M41.9 SCOLIOSIS, UNSPECIFIED SCOLIOSIS TYPE, UNSPECIFIED SPINAL REGION: Chronic | ICD-10-CM

## 2018-12-21 DIAGNOSIS — N30.00 ACUTE CYSTITIS WITHOUT HEMATURIA: Primary | ICD-10-CM

## 2018-12-21 LAB
BILIRUB SERPL-MCNC: NEGATIVE MG/DL
BLOOD URINE, POC: ABNORMAL
COLOR, POC UA: YELLOW
GLUCOSE UR QL STRIP: NEGATIVE
KETONES UR QL STRIP: NEGATIVE
LEUKOCYTE ESTERASE URINE, POC: ABNORMAL
NITRITE, POC UA: NEGATIVE
PH, POC UA: 7
PROTEIN, POC: ABNORMAL
SPECIFIC GRAVITY, POC UA: 1
UROBILINOGEN, POC UA: NEGATIVE

## 2018-12-21 PROCEDURE — 3077F SYST BP >= 140 MM HG: CPT | Mod: S$GLB,,, | Performed by: FAMILY MEDICINE

## 2018-12-21 PROCEDURE — 99214 OFFICE O/P EST MOD 30 MIN: CPT | Mod: 25,S$GLB,, | Performed by: FAMILY MEDICINE

## 2018-12-21 PROCEDURE — 81002 URINALYSIS NONAUTO W/O SCOPE: CPT | Mod: S$GLB,,, | Performed by: FAMILY MEDICINE

## 2018-12-21 PROCEDURE — 99999 PR PBB SHADOW E&M-EST. PATIENT-LVL III: CPT | Mod: PBBFAC,,, | Performed by: FAMILY MEDICINE

## 2018-12-21 PROCEDURE — 1101F PT FALLS ASSESS-DOCD LE1/YR: CPT | Mod: S$GLB,,, | Performed by: FAMILY MEDICINE

## 2018-12-21 PROCEDURE — 3080F DIAST BP >= 90 MM HG: CPT | Mod: S$GLB,,, | Performed by: FAMILY MEDICINE

## 2018-12-21 RX ORDER — LEVOFLOXACIN 250 MG/1
250 TABLET ORAL DAILY
Qty: 10 TABLET | Refills: 0 | Status: SHIPPED | OUTPATIENT
Start: 2018-12-21 | End: 2018-12-31

## 2018-12-21 RX ORDER — PHENAZOPYRIDINE HYDROCHLORIDE 200 MG/1
200 TABLET, FILM COATED ORAL 3 TIMES DAILY PRN
Qty: 15 TABLET | Refills: 0 | Status: SHIPPED | OUTPATIENT
Start: 2018-12-21 | End: 2018-12-31

## 2018-12-21 RX ORDER — LISINOPRIL AND HYDROCHLOROTHIAZIDE 12.5; 2 MG/1; MG/1
1 TABLET ORAL DAILY
Qty: 90 TABLET | Refills: 3 | Status: SHIPPED | OUTPATIENT
Start: 2018-12-21 | End: 2019-03-15 | Stop reason: SDUPTHER

## 2018-12-21 RX ORDER — OXYBUTYNIN CHLORIDE 10 MG/1
10 TABLET, EXTENDED RELEASE ORAL DAILY
Qty: 90 TABLET | Refills: 3 | Status: SHIPPED | OUTPATIENT
Start: 2018-12-21 | End: 2019-03-15 | Stop reason: SDUPTHER

## 2018-12-21 RX ORDER — ALENDRONATE SODIUM 70 MG/1
70 TABLET ORAL
Qty: 12 TABLET | Refills: 3 | Status: SHIPPED | OUTPATIENT
Start: 2018-12-21 | End: 2019-03-15 | Stop reason: SDUPTHER

## 2018-12-21 RX ORDER — LISINOPRIL AND HYDROCHLOROTHIAZIDE 12.5; 2 MG/1; MG/1
1 TABLET ORAL DAILY
Qty: 30 TABLET | Refills: 5 | Status: SHIPPED | OUTPATIENT
Start: 2018-12-21 | End: 2018-12-21 | Stop reason: SDUPTHER

## 2018-12-21 NOTE — PROGRESS NOTES
Subjective:       Patient ID: Alexandria Estrada is a 68 y.o. female.    Chief Complaint: Urinary Tract Infection    HPI: 68-year-old white female complaining --history UTI--started yesterday--+ freq, + urgency,+ retention, some discomfort but not burning yet.  No pyuria hematuria or odor.  More of a pressure--groin and suprapubic areas.  Gets UTI every couple years.  +nephrolithiasis       Blood pressure has been running high 149/94  ROS:  Skin: no psoriasis, eczema, skin cancer  HEENT: + headache, ocular pain, blurred vision, diplopia, epistaxis, +hoarseness--saw ENT given a nose spray no  change in voice, thyroid trouble  Lung: No pneumonia, asthma, Tb, wheezing, SOB, no smoke  Heart: No chest pain, ankle edema, palpitations, MI, elaine murmur, +hypertension,no hyperlipidemia  Abdomen: No nausea, vomiting,  diarrhea, constipation, ulcers, hepatitis, gallbladder disease, melena, hematochezia, hematemesis  : See history of present illness  MS: no fractures, + O/A--needs hands severe scoliosis lumbar spine,no lupus, rheumatoid, gout  Neuro: No dizziness, LOC, seizures   No diabetes, no anemia, no anxiety, no depression   , 2 daughters, work stays within a day, lives with  father    Objective:   Physical Exam:  General: Well nourished, well developed, no acute distress  Skin: No lesions  HEENT: Eyes PERRLA, EOM intact, nose patent, throat non-erythematous   NECK: Supple, no bruits, No JVD, no nodes  Lungs: Clear, no rales, rhonchi, wheezing  Heart: Regular rate and rhythm, no murmurs, gallops, or rubs  Abdomen: flat, bowel sounds positive, no tenderness, or organomegaly some pressure in suprapubic area and groin area  MS: Severe scoliosis of the lumbar spine and thoracic spine  Neuro: Alert, CN intact, oriented X 3  Extremities: No cyanosis, clubbing, or edema         Assessment:       1. Acute cystitis without hematuria    2. HTN (hypertension), benign    3. Scoliosis, unspecified scoliosis type,  unspecified spinal region        Plan:       Acute cystitis without hematuria    HTN (hypertension), benign    Scoliosis, unspecified scoliosis type, unspecified spinal region      U/A 20 WBC,20 RBC small bacteria-- in April was treated with Macrobid  Levaquin 251 p.o. q.d. times 10 days peridium 200 mg 1 p.o. T.i.d.--return 2 weeks redo UA  If has not had a pelvic exam in a long time may benefit from a pelvic  Blood pressure 149/94 has been running high low-sodium diet patient needs arm blood pressure cuff check blood pressure daily changes lisinopril to lisinopril HCT 20/12.51 p.o. Q.d.  Needs lab q.6 months CBCs CMP lipid

## 2019-01-07 ENCOUNTER — OFFICE VISIT (OUTPATIENT)
Dept: PRIMARY CARE CLINIC | Facility: CLINIC | Age: 69
End: 2019-01-07
Payer: MEDICARE

## 2019-01-07 VITALS
DIASTOLIC BLOOD PRESSURE: 91 MMHG | BODY MASS INDEX: 19.93 KG/M2 | RESPIRATION RATE: 18 BRPM | HEART RATE: 68 BPM | SYSTOLIC BLOOD PRESSURE: 150 MMHG | HEIGHT: 67 IN | WEIGHT: 127 LBS | OXYGEN SATURATION: 97 %

## 2019-01-07 DIAGNOSIS — J40 BRONCHITIS: Primary | ICD-10-CM

## 2019-01-07 DIAGNOSIS — I10 HTN (HYPERTENSION), BENIGN: ICD-10-CM

## 2019-01-07 DIAGNOSIS — M41.9 SCOLIOSIS, UNSPECIFIED SCOLIOSIS TYPE, UNSPECIFIED SPINAL REGION: Chronic | ICD-10-CM

## 2019-01-07 DIAGNOSIS — S39.012A LUMBOSACRAL STRAIN, INITIAL ENCOUNTER: ICD-10-CM

## 2019-01-07 DIAGNOSIS — M54.30 SCIATICA, UNSPECIFIED LATERALITY: ICD-10-CM

## 2019-01-07 PROCEDURE — 1101F PT FALLS ASSESS-DOCD LE1/YR: CPT | Mod: S$GLB,,, | Performed by: FAMILY MEDICINE

## 2019-01-07 PROCEDURE — 96372 THER/PROPH/DIAG INJ SC/IM: CPT | Mod: S$GLB,,, | Performed by: FAMILY MEDICINE

## 2019-01-07 PROCEDURE — 99213 PR OFFICE/OUTPT VISIT, EST, LEVL III, 20-29 MIN: ICD-10-PCS | Mod: 25,S$GLB,, | Performed by: FAMILY MEDICINE

## 2019-01-07 PROCEDURE — 96372 PR INJECTION,THERAP/PROPH/DIAG2ST, IM OR SUBCUT: ICD-10-PCS | Mod: S$GLB,,, | Performed by: FAMILY MEDICINE

## 2019-01-07 PROCEDURE — 99999 PR PBB SHADOW E&M-EST. PATIENT-LVL III: CPT | Mod: PBBFAC,,, | Performed by: FAMILY MEDICINE

## 2019-01-07 PROCEDURE — 3077F SYST BP >= 140 MM HG: CPT | Mod: S$GLB,,, | Performed by: FAMILY MEDICINE

## 2019-01-07 PROCEDURE — 99999 PR PBB SHADOW E&M-EST. PATIENT-LVL III: ICD-10-PCS | Mod: PBBFAC,,, | Performed by: FAMILY MEDICINE

## 2019-01-07 PROCEDURE — 99213 OFFICE O/P EST LOW 20 MIN: CPT | Mod: 25,S$GLB,, | Performed by: FAMILY MEDICINE

## 2019-01-07 PROCEDURE — 1101F PR PT FALLS ASSESS DOC 0-1 FALLS W/OUT INJ PAST YR: ICD-10-PCS | Mod: S$GLB,,, | Performed by: FAMILY MEDICINE

## 2019-01-07 PROCEDURE — 3080F PR MOST RECENT DIASTOLIC BLOOD PRESSURE >= 90 MM HG: ICD-10-PCS | Mod: S$GLB,,, | Performed by: FAMILY MEDICINE

## 2019-01-07 PROCEDURE — 3077F PR MOST RECENT SYSTOLIC BLOOD PRESSURE >= 140 MM HG: ICD-10-PCS | Mod: S$GLB,,, | Performed by: FAMILY MEDICINE

## 2019-01-07 PROCEDURE — 3080F DIAST BP >= 90 MM HG: CPT | Mod: S$GLB,,, | Performed by: FAMILY MEDICINE

## 2019-01-07 RX ORDER — CEFDINIR 300 MG/1
300 CAPSULE ORAL 2 TIMES DAILY
Qty: 20 CAPSULE | Refills: 0 | Status: SHIPPED | OUTPATIENT
Start: 2019-01-07 | End: 2019-01-17

## 2019-01-07 RX ORDER — PROMETHAZINE HYDROCHLORIDE AND CODEINE PHOSPHATE 6.25; 1 MG/5ML; MG/5ML
5 SOLUTION ORAL EVERY 6 HOURS PRN
Qty: 180 ML | Refills: 0 | Status: SHIPPED | OUTPATIENT
Start: 2019-01-07 | End: 2019-03-15

## 2019-01-07 RX ORDER — BETAMETHASONE SODIUM PHOSPHATE AND BETAMETHASONE ACETATE 3; 3 MG/ML; MG/ML
12 INJECTION, SUSPENSION INTRA-ARTICULAR; INTRALESIONAL; INTRAMUSCULAR; SOFT TISSUE
Status: COMPLETED | OUTPATIENT
Start: 2019-01-07 | End: 2019-01-07

## 2019-01-07 RX ORDER — METHYLPREDNISOLONE 4 MG/1
TABLET ORAL
Qty: 1 PACKAGE | Refills: 0 | Status: SHIPPED | OUTPATIENT
Start: 2019-01-07 | End: 2019-03-15

## 2019-01-07 RX ADMIN — BETAMETHASONE SODIUM PHOSPHATE AND BETAMETHASONE ACETATE 12 MG: 3; 3 INJECTION, SUSPENSION INTRA-ARTICULAR; INTRALESIONAL; INTRAMUSCULAR; SOFT TISSUE at 03:01

## 2019-01-07 NOTE — PROGRESS NOTES
Subjective:       Patient ID: Alexandria Estrada is a 68 y.o. female.    Chief Complaint: Sore Throat and Heartburn    HPI: 68-year-old white female --patient with blood pressure 150/91 was slightly elevated last visit and was given a new hypertensive medication but patient was trying to use up all medications 1st       Started with cold x2 weeks--started with sore throat--no fever, no runny nose, dry cough-raspy breathing x3 days no phlegm.  No pneumonia asthma TB no smoke father 6 with same symptoms   ROS:  Skin: no psoriasis, eczema, skin cancer  HEENT: + headache, ocular pain, blurred vision, diplopia, epistaxis, +hoarseness--saw ENT 6 mo ago ears kept popping told was ear throat thing  Lung: No pneumonia, asthma, Tb, wheezing, SOB, no smoke  Heart: No chest pain, ankle edema, palpitations, MI, elaine murmur, +hypertension,no hyperlipidemia  Abdomen: No nausea, vomiting,  diarrhea, constipation, ulcers, hepatitis, gallbladder disease, melena, hematochezia, hematemesis  :  Recent UTI treated with Levaquin history renal stone 20 years ago  MS: no fractures, + O/A--needs hands severe scoliosis lumbar spine,no lupus, rheumatoid, gout  Neuro: No dizziness, LOC, seizures   No diabetes, no anemia, no anxiety, no depression   , 2 daughters, work stays within a day, lives with  father    Objective:   Physical Exam:  General: Well nourished, well developed, no acute distress  Skin: No lesions  HEENT: Eyes PERRLA, EOM intact, nose clear discharge, throat +1/4 rythematous ears clear fluid  NECK: Supple, no bruits, No JVD, no nodes  Lungs: Clear, no rales, rhonchi, wheezing +coarse cough  Heart: Regular rate and rhythm, no murmurs, gallops, or rubs  Abdomen: flat, bowel sounds positive, no tenderness, or organomegaly some pressure in suprapubic area and groin area  MS: Severe scoliosis of the lumbar spine and thoracic spine  Neuro: Alert, CN intact, oriented X 3  Extremities: No cyanosis, clubbing, or edema          Assessment:       1. Bronchitis    2. HTN (hypertension), benign    3. Scoliosis, unspecified scoliosis type, unspecified spinal region    4. Lumbosacral strain, initial encounter    5. Sciatica, unspecified laterality        Plan:       Bronchitis    HTN (hypertension), benign    Scoliosis, unspecified scoliosis type, unspecified spinal region    Lumbosacral strain, initial encounter    Sciatica, unspecified laterality      had recent UTI treated with Levaquin so will treat URI with Omnicef 300 b.i.d. times 7-10 days/Celestone 2 cc, Medrol Dosepak and Phenergan with codeine for cough  Needs eventually to do lab as in computer CBC,CMP,lipiod, T4,TSH, U/A stool guaiac if over 2-3 yrts add EKG and chest xray   Blood pressure 149/94 last visit now 150/91 needs be on low NA diet , check BP with arm cuff daily needs systolic blood pressure less than 140 diastolic less than 90  Needs lab q.6 months CBCs CMP lipid

## 2019-03-08 ENCOUNTER — TELEPHONE (OUTPATIENT)
Dept: PRIMARY CARE CLINIC | Facility: CLINIC | Age: 69
End: 2019-03-08

## 2019-03-15 ENCOUNTER — OFFICE VISIT (OUTPATIENT)
Dept: PRIMARY CARE CLINIC | Facility: CLINIC | Age: 69
End: 2019-03-15
Payer: MEDICARE

## 2019-03-15 VITALS
HEIGHT: 67 IN | WEIGHT: 125 LBS | DIASTOLIC BLOOD PRESSURE: 68 MMHG | BODY MASS INDEX: 19.62 KG/M2 | HEART RATE: 74 BPM | OXYGEN SATURATION: 95 % | SYSTOLIC BLOOD PRESSURE: 101 MMHG | RESPIRATION RATE: 18 BRPM

## 2019-03-15 DIAGNOSIS — Z12.31 VISIT FOR SCREENING MAMMOGRAM: ICD-10-CM

## 2019-03-15 DIAGNOSIS — M41.9 SCOLIOSIS, UNSPECIFIED SCOLIOSIS TYPE, UNSPECIFIED SPINAL REGION: Chronic | ICD-10-CM

## 2019-03-15 DIAGNOSIS — M19.079 OSTEOARTHRITIS OF ANKLE AND FOOT, UNSPECIFIED LATERALITY: ICD-10-CM

## 2019-03-15 DIAGNOSIS — M79.671 INFLAMMATORY HEEL PAIN, RIGHT: Primary | ICD-10-CM

## 2019-03-15 DIAGNOSIS — I10 HTN (HYPERTENSION), BENIGN: ICD-10-CM

## 2019-03-15 DIAGNOSIS — S39.012A LUMBOSACRAL STRAIN, INITIAL ENCOUNTER: ICD-10-CM

## 2019-03-15 DIAGNOSIS — N32.81 OVERACTIVE BLADDER: ICD-10-CM

## 2019-03-15 PROCEDURE — 1101F PR PT FALLS ASSESS DOC 0-1 FALLS W/OUT INJ PAST YR: ICD-10-PCS | Mod: S$GLB,,, | Performed by: FAMILY MEDICINE

## 2019-03-15 PROCEDURE — 3078F PR MOST RECENT DIASTOLIC BLOOD PRESSURE < 80 MM HG: ICD-10-PCS | Mod: S$GLB,,, | Performed by: FAMILY MEDICINE

## 2019-03-15 PROCEDURE — 3078F DIAST BP <80 MM HG: CPT | Mod: S$GLB,,, | Performed by: FAMILY MEDICINE

## 2019-03-15 PROCEDURE — 99214 PR OFFICE/OUTPT VISIT, EST, LEVL IV, 30-39 MIN: ICD-10-PCS | Mod: S$GLB,,, | Performed by: FAMILY MEDICINE

## 2019-03-15 PROCEDURE — 1101F PT FALLS ASSESS-DOCD LE1/YR: CPT | Mod: S$GLB,,, | Performed by: FAMILY MEDICINE

## 2019-03-15 PROCEDURE — 3074F SYST BP LT 130 MM HG: CPT | Mod: S$GLB,,, | Performed by: FAMILY MEDICINE

## 2019-03-15 PROCEDURE — 99214 OFFICE O/P EST MOD 30 MIN: CPT | Mod: S$GLB,,, | Performed by: FAMILY MEDICINE

## 2019-03-15 PROCEDURE — 3074F PR MOST RECENT SYSTOLIC BLOOD PRESSURE < 130 MM HG: ICD-10-PCS | Mod: S$GLB,,, | Performed by: FAMILY MEDICINE

## 2019-03-15 PROCEDURE — 99999 PR PBB SHADOW E&M-EST. PATIENT-LVL IV: ICD-10-PCS | Mod: PBBFAC,,, | Performed by: FAMILY MEDICINE

## 2019-03-15 PROCEDURE — 99999 PR PBB SHADOW E&M-EST. PATIENT-LVL IV: CPT | Mod: PBBFAC,,, | Performed by: FAMILY MEDICINE

## 2019-03-15 RX ORDER — LISINOPRIL AND HYDROCHLOROTHIAZIDE 12.5; 2 MG/1; MG/1
1 TABLET ORAL DAILY
Qty: 90 TABLET | Refills: 3 | Status: SHIPPED | OUTPATIENT
Start: 2019-03-15 | End: 2019-07-22 | Stop reason: SDUPTHER

## 2019-03-15 RX ORDER — ALENDRONATE SODIUM 70 MG/1
70 TABLET ORAL
Qty: 12 TABLET | Refills: 3 | Status: SHIPPED | OUTPATIENT
Start: 2019-03-15 | End: 2019-07-22 | Stop reason: SDUPTHER

## 2019-03-15 RX ORDER — OXYBUTYNIN CHLORIDE 10 MG/1
10 TABLET, EXTENDED RELEASE ORAL DAILY
Qty: 90 TABLET | Refills: 3 | Status: SHIPPED | OUTPATIENT
Start: 2019-03-15 | End: 2019-07-22 | Stop reason: SDUPTHER

## 2019-03-15 NOTE — PROGRESS NOTES
Subjective:       Patient ID: Alexandria Estrada is a 69 y.o. female.    Chief Complaint: Annual Exam and Medication Refill    HPI: 70 yo WF-- Well Visit --patient having problems with bilateral heel pain--used to see Dr. Covington but she is moving.       Had last lab pt states  in Feb Eating OK some decreased appetite due to father in the hospital had a bad fall--in skilled nursing now, + BM, Ambulating well except heel pain right --thinks was sitting in the hospital 2 months   ROS:  Skin: no psoriasis, eczema, skin cancer skin lesion right side of the nose has appointment Dr. Armstrong has lesions frozen off  HEENT: + headache if off caffiene ,no ocular pain, blurred vision, diplopia, epistaxis, +hoarseness--saw ENT 6 mo on flonase   Lung: No pneumonia, asthma, Tb, wheezing, SOB, no smoking  Heart: No chest pain, ankle edema, palpitations, MI, elaine murmur, +hypertension,no hyperlipidemia  Abdomen: No nausea, vomiting,  diarrhea, constipation, ulcers, hepatitis, gallbladder disease, melena, hematochezia, hematemesis  :  Has overactive bladder no recent UTI  history renal stone 20 years ago  MS: no fractures, + O/A--needs hands severe scoliosis lumbar spine, right heel see history of present,no lupus, rheumatoid, gout  Neuro: No dizziness, LOC, seizures   No diabetes, no anemia, no anxiety, no depression   , 2 daughters, work stays within a day, lives with  father    Objective:   Physical Exam:  General: Well nourished, well developed, no acute distress +thin  Skin: No lesions  HEENT: Eyes PERRLA, EOM intact, nose clear discharge, throat none rythematous ears clear fluid  NECK: Supple, no bruits, No JVD, no nodes  Lungs: Clear, no rales, rhonchi, wheezing   Heart: Regular rate and rhythm, no murmurs, gallops, or rubs  Abdomen: flat, bowel sounds positive, no tenderness, or organomegaly some pressure in suprapubic area and groin area  MS: Severe scoliosis of the lumbar spine and thoracic spine, pain right  heel mainly at the base of the heel and Achilles area more with weight-bearing then with palpation or flexion extension or inversion or eversion of the foot more with weight-bearing  Neuro: Alert, CN intact, oriented X 3  Extremities: No cyanosis, clubbing, or edema         Assessment:       1. Inflammatory heel pain, right    2. HTN (hypertension), benign    3. Scoliosis, unspecified scoliosis type, unspecified spinal region    4. Osteoarthritis of ankle and foot, unspecified laterality    5. Lumbosacral strain, initial encounter    6. Overactive bladder    7. Visit for screening mammogram        Plan:       Inflammatory heel pain, right  -     X-Ray Calcaneus 2 View Right; Future; Expected date: 03/15/2019  -     Ambulatory Referral to Podiatry    HTN (hypertension), benign  -     CBC auto differential; Future; Expected date: 03/15/2019  -     Comprehensive metabolic panel; Future; Expected date: 03/15/2019  -     EKG 12-lead; Future  -     Fecal Immunochemical Test (iFOBT); Future; Expected date: 03/15/2019  -     Lipid panel; Future; Expected date: 03/15/2019  -     X-Ray Chest PA And Lateral; Future; Expected date: 03/15/2019  -     POCT urine dipstick without microscope  -     T4, free; Future; Expected date: 03/15/2019  -     TSH; Future; Expected date: 03/15/2019  -     Hepatitis C antibody; Future; Expected date: 03/15/2019    Scoliosis, unspecified scoliosis type, unspecified spinal region    Osteoarthritis of ankle and foot, unspecified laterality    Lumbosacral strain, initial encounter    Overactive bladder    Visit for screening mammogram  -     Mammo Digital Screening Bilat without CA; Future; Expected date: 03/29/2019    Other orders  -     alendronate (FOSAMAX) 70 MG tablet; Take 1 tablet (70 mg total) by mouth every 7 days.  Dispense: 12 tablet; Refill: 3  -     lisinopril-hydrochlorothiazide (PRINZIDE,ZESTORETIC) 20-12.5 mg per tablet; Take 1 tablet by mouth once daily.  Dispense: 90 tablet;  Refill: 3  -     oxybutynin (DITROPAN-XL) 10 MG 24 hr tablet; Take 1 tablet (10 mg total) by mouth once daily.  Dispense: 90 tablet; Refill: 3     Needs BP cuff --check BP at home if continues to run low may be able to wean if down blood pressure medications  Needs routine physical CBCs CMP lipids T4 TSH stool guaiac UA chest x-ray EKG  Right heel pain Needs x-ray of the right heel--podiatry consult--on Aleve OTC 1 p.o. b.i.d. p.r.n. Pain  Maintenance needs hepatitis C screen/tetanus shot if over 10 years/bone density immunizations if not given mammogram if over 1 year

## 2019-04-01 ENCOUNTER — OFFICE VISIT (OUTPATIENT)
Dept: PRIMARY CARE CLINIC | Facility: CLINIC | Age: 69
End: 2019-04-01
Payer: MEDICARE

## 2019-04-01 VITALS
BODY MASS INDEX: 19.62 KG/M2 | HEART RATE: 66 BPM | RESPIRATION RATE: 18 BRPM | HEIGHT: 67 IN | WEIGHT: 125 LBS | DIASTOLIC BLOOD PRESSURE: 80 MMHG | OXYGEN SATURATION: 97 % | SYSTOLIC BLOOD PRESSURE: 121 MMHG

## 2019-04-01 DIAGNOSIS — M54.30 SCIATICA, UNSPECIFIED LATERALITY: ICD-10-CM

## 2019-04-01 DIAGNOSIS — I10 HTN (HYPERTENSION), BENIGN: ICD-10-CM

## 2019-04-01 DIAGNOSIS — N32.81 OVERACTIVE BLADDER: ICD-10-CM

## 2019-04-01 DIAGNOSIS — M54.50 ACUTE LEFT-SIDED LOW BACK PAIN WITHOUT SCIATICA: ICD-10-CM

## 2019-04-01 DIAGNOSIS — S39.012A LUMBOSACRAL STRAIN, INITIAL ENCOUNTER: ICD-10-CM

## 2019-04-01 DIAGNOSIS — M25.552 LEFT HIP PAIN: Primary | ICD-10-CM

## 2019-04-01 DIAGNOSIS — M41.9 SCOLIOSIS, UNSPECIFIED SCOLIOSIS TYPE, UNSPECIFIED SPINAL REGION: Chronic | ICD-10-CM

## 2019-04-01 PROCEDURE — 3079F PR MOST RECENT DIASTOLIC BLOOD PRESSURE 80-89 MM HG: ICD-10-PCS | Mod: S$GLB,,, | Performed by: FAMILY MEDICINE

## 2019-04-01 PROCEDURE — 3074F SYST BP LT 130 MM HG: CPT | Mod: S$GLB,,, | Performed by: FAMILY MEDICINE

## 2019-04-01 PROCEDURE — 3074F PR MOST RECENT SYSTOLIC BLOOD PRESSURE < 130 MM HG: ICD-10-PCS | Mod: S$GLB,,, | Performed by: FAMILY MEDICINE

## 2019-04-01 PROCEDURE — 99213 PR OFFICE/OUTPT VISIT, EST, LEVL III, 20-29 MIN: ICD-10-PCS | Mod: 25,S$GLB,, | Performed by: FAMILY MEDICINE

## 2019-04-01 PROCEDURE — 99999 PR PBB SHADOW E&M-EST. PATIENT-LVL IV: ICD-10-PCS | Mod: PBBFAC,,, | Performed by: FAMILY MEDICINE

## 2019-04-01 PROCEDURE — 1101F PT FALLS ASSESS-DOCD LE1/YR: CPT | Mod: S$GLB,,, | Performed by: FAMILY MEDICINE

## 2019-04-01 PROCEDURE — 99213 OFFICE O/P EST LOW 20 MIN: CPT | Mod: 25,S$GLB,, | Performed by: FAMILY MEDICINE

## 2019-04-01 PROCEDURE — 96372 THER/PROPH/DIAG INJ SC/IM: CPT | Mod: S$GLB,,, | Performed by: FAMILY MEDICINE

## 2019-04-01 PROCEDURE — 99999 PR PBB SHADOW E&M-EST. PATIENT-LVL IV: CPT | Mod: PBBFAC,,, | Performed by: FAMILY MEDICINE

## 2019-04-01 PROCEDURE — 1101F PR PT FALLS ASSESS DOC 0-1 FALLS W/OUT INJ PAST YR: ICD-10-PCS | Mod: S$GLB,,, | Performed by: FAMILY MEDICINE

## 2019-04-01 PROCEDURE — 3079F DIAST BP 80-89 MM HG: CPT | Mod: S$GLB,,, | Performed by: FAMILY MEDICINE

## 2019-04-01 PROCEDURE — 96372 PR INJECTION,THERAP/PROPH/DIAG2ST, IM OR SUBCUT: ICD-10-PCS | Mod: S$GLB,,, | Performed by: FAMILY MEDICINE

## 2019-04-01 RX ORDER — IBUPROFEN 600 MG/1
TABLET ORAL
Qty: 60 TABLET | Refills: 5 | Status: SHIPPED | OUTPATIENT
Start: 2019-04-01 | End: 2019-04-01 | Stop reason: SDUPTHER

## 2019-04-01 RX ORDER — METHYLPREDNISOLONE 4 MG/1
TABLET ORAL
Qty: 1 PACKAGE | Refills: 0 | Status: SHIPPED | OUTPATIENT
Start: 2019-04-01 | End: 2019-04-01 | Stop reason: SDUPTHER

## 2019-04-01 RX ORDER — TRIAMCINOLONE ACETONIDE 40 MG/ML
40 INJECTION, SUSPENSION INTRA-ARTICULAR; INTRAMUSCULAR
Status: COMPLETED | OUTPATIENT
Start: 2019-04-01 | End: 2019-04-01

## 2019-04-01 RX ADMIN — TRIAMCINOLONE ACETONIDE 40 MG: 40 INJECTION, SUSPENSION INTRA-ARTICULAR; INTRAMUSCULAR at 12:04

## 2019-04-01 NOTE — PROGRESS NOTES
Subjective:       Patient ID: Alexandria Estrada is a 69 y.o. female.    Chief Complaint: Hip Pain    HPI: 68 yo WF--daughter caring for father--not L is see Dr. Covington podiatrist for bilateral foot pain--patient now complaining left hip pain. Left hip pain-- started with helping father walk in hospital .  Patient states especially if supine in goes to stand up has pain in the left hip---does not last very long if able to get herself in the right position.  Pain mainly with standing not with bending or squatting.  No prior hip injury, no recent trauma, no excessive activity..  No lupus rheumatoid gout   ROS:  Skin: no psoriasis, eczema, skin cancer skin lesion right side of the nose has appointment Dr. Armstrong has lesions frozen off--had lesion frozen again recently  HEENT: ,no ocular pain, blurred vision, diplopia, epistaxis, +hoarseness--saw ENT 6 mo on flonase   Lung: No pneumonia, asthma, Tb, wheezing, SOB, no smoking  Heart: No chest pain, ankle edema, palpitations, MI, elaine murmur, +hypertension,no hyperlipidemia  Abdomen: No nausea, vomiting,  diarrhea, constipation, ulcers, hepatitis, gallbladder disease, melena, hematochezia, hematemesis  :  Has overactive bladder no recent UTI  history renal stone 20 years ago-on oxybutynin for bladder spasm  MS: no fractures, + O/A--needs hands severe scoliosis lumbar spine, right heel see history of present,no lupus, rheumatoid, gout --now hip pain see history of present does occasionally fracture toes kicking something accidently  Neuro: No dizziness, LOC, seizures   No diabetes, no anemia, no anxiety, no depression   , 2 daughters, work stays within a day, lives with  father    Objective:   Physical Exam:  General: Well nourished, well developed, no acute distress +thin  Skin: No lesions  HEENT: Eyes PERRLA, EOM intact, nose clear discharge, throat none rythematous ears clear fluid  NECK: Supple, no bruits, No JVD, no nodes  Lungs: Clear, no rales,  rhonchi, wheezing   Heart: Regular rate and rhythm, no murmurs, gallops, or rubs  Abdomen: flat, bowel sounds positive, no tenderness, or organomegaly some pressure in suprapubic area and groin area  MS: Severe scoliosis of the lumbar spine and thoracic spine, pain right heel mainly at the base of the heel and Achilles area more with weight-bearing then with palpation or flexion extension or inversion or eversion of the foot more with weight-bearing  Neuro: Alert, CN intact, oriented X 3  Extremities: No cyanosis, clubbing, or edema         Assessment:       1. Left hip pain    2. Scoliosis, unspecified scoliosis type, unspecified spinal region    3. Lumbosacral strain, initial encounter    4. Sciatica, unspecified laterality    5. Overactive bladder    6. HTN (hypertension), benign        Plan:       Left hip pain    Scoliosis, unspecified scoliosis type, unspecified spinal region    Lumbosacral strain, initial encounter    Sciatica, unspecified laterality    Overactive bladder    HTN (hypertension), benign      lab review cholesterol 219 better of 180  better of 100 but HDL 75 so overall lipids okay  Blood pressure stable 121/80 continue present medication--do blood pressure checks arm q.a.m.  Left hip pain mainly in the sacroiliac area---pain with mainly going from supine position to standing---patient to get x-ray of the left hip and lumbar spine--Kenalog/Medrol--ibuprofen 600 q.8h  Moist heat/theragesic/range of motion exercise--has severe scoliosis of the back history of lumbosacral strain with sciatica if pain persists may need an orthopedic consult doubt is a surgical issue  Patient also had a recent bone scan is on fosfamax   Right heel pain Needs x-ray of the right heel--podiatry consult--on Aleve OTC 1 p.o. b.i.d. p.r.n. Pain --x-ray showed soft tissue swelling suggestive her plantar fasciitis

## 2019-04-01 NOTE — PROGRESS NOTES
Verified pt identity using name and . NKDA. Administered 40 mg kenalog in left VG per physician order using aseptic technique. Aspirated and no blood return noted. Pt tolerated well with no adverse reactions noted.

## 2019-04-02 NOTE — TELEPHONE ENCOUNTER
----- Message from Dwight Meehan MD sent at 4/2/2019  1:16 PM CDT -----  Call patient tell left hip--no abnormality seen a pain stays may need MRI of the hip, x-ray lumbar spine severe thoracolumbar levoscoliosis with degenerative disc and facet changes

## 2019-04-04 ENCOUNTER — TELEPHONE (OUTPATIENT)
Dept: ADMINISTRATIVE | Facility: HOSPITAL | Age: 69
End: 2019-04-04

## 2019-04-04 RX ORDER — METHYLPREDNISOLONE 4 MG/1
TABLET ORAL
Qty: 1 PACKAGE | Refills: 0 | Status: SHIPPED | OUTPATIENT
Start: 2019-04-04 | End: 2019-07-22

## 2019-04-04 RX ORDER — IBUPROFEN 600 MG/1
TABLET ORAL
Qty: 60 TABLET | Refills: 5 | Status: SHIPPED | OUTPATIENT
Start: 2019-04-04 | End: 2019-07-22

## 2019-07-22 ENCOUNTER — OFFICE VISIT (OUTPATIENT)
Dept: PRIMARY CARE CLINIC | Facility: CLINIC | Age: 69
End: 2019-07-22
Payer: MEDICARE

## 2019-07-22 VITALS
WEIGHT: 126.38 LBS | SYSTOLIC BLOOD PRESSURE: 148 MMHG | BODY MASS INDEX: 20.31 KG/M2 | HEIGHT: 66 IN | RESPIRATION RATE: 18 BRPM | HEART RATE: 68 BPM | OXYGEN SATURATION: 96 % | TEMPERATURE: 98 F | DIASTOLIC BLOOD PRESSURE: 84 MMHG

## 2019-07-22 DIAGNOSIS — I10 HTN (HYPERTENSION), BENIGN: ICD-10-CM

## 2019-07-22 DIAGNOSIS — S39.012A LUMBOSACRAL STRAIN, INITIAL ENCOUNTER: ICD-10-CM

## 2019-07-22 DIAGNOSIS — M41.9 SCOLIOSIS, UNSPECIFIED SCOLIOSIS TYPE, UNSPECIFIED SPINAL REGION: Chronic | ICD-10-CM

## 2019-07-22 DIAGNOSIS — R39.9 UTI SYMPTOMS: Primary | ICD-10-CM

## 2019-07-22 DIAGNOSIS — M54.30 SCIATICA, UNSPECIFIED LATERALITY: ICD-10-CM

## 2019-07-22 DIAGNOSIS — Z87.442 HISTORY OF NEPHROLITHIASIS: ICD-10-CM

## 2019-07-22 PROCEDURE — 3079F PR MOST RECENT DIASTOLIC BLOOD PRESSURE 80-89 MM HG: ICD-10-PCS | Mod: S$GLB,,, | Performed by: FAMILY MEDICINE

## 2019-07-22 PROCEDURE — 99213 PR OFFICE/OUTPT VISIT, EST, LEVL III, 20-29 MIN: ICD-10-PCS | Mod: S$GLB,,, | Performed by: FAMILY MEDICINE

## 2019-07-22 PROCEDURE — 99999 PR PBB SHADOW E&M-EST. PATIENT-LVL III: CPT | Mod: PBBFAC,,, | Performed by: FAMILY MEDICINE

## 2019-07-22 PROCEDURE — 1101F PR PT FALLS ASSESS DOC 0-1 FALLS W/OUT INJ PAST YR: ICD-10-PCS | Mod: S$GLB,,, | Performed by: FAMILY MEDICINE

## 2019-07-22 PROCEDURE — 1101F PT FALLS ASSESS-DOCD LE1/YR: CPT | Mod: S$GLB,,, | Performed by: FAMILY MEDICINE

## 2019-07-22 PROCEDURE — 99999 PR PBB SHADOW E&M-EST. PATIENT-LVL III: ICD-10-PCS | Mod: PBBFAC,,, | Performed by: FAMILY MEDICINE

## 2019-07-22 PROCEDURE — 3077F SYST BP >= 140 MM HG: CPT | Mod: S$GLB,,, | Performed by: FAMILY MEDICINE

## 2019-07-22 PROCEDURE — 3077F PR MOST RECENT SYSTOLIC BLOOD PRESSURE >= 140 MM HG: ICD-10-PCS | Mod: S$GLB,,, | Performed by: FAMILY MEDICINE

## 2019-07-22 PROCEDURE — 3079F DIAST BP 80-89 MM HG: CPT | Mod: S$GLB,,, | Performed by: FAMILY MEDICINE

## 2019-07-22 PROCEDURE — 99213 OFFICE O/P EST LOW 20 MIN: CPT | Mod: S$GLB,,, | Performed by: FAMILY MEDICINE

## 2019-07-22 RX ORDER — NITROFURANTOIN 25; 75 MG/1; MG/1
100 CAPSULE ORAL 2 TIMES DAILY
Qty: 14 CAPSULE | Refills: 0 | Status: SHIPPED | OUTPATIENT
Start: 2019-07-22 | End: 2019-08-05

## 2019-07-22 RX ORDER — ALENDRONATE SODIUM 70 MG/1
70 TABLET ORAL
Qty: 12 TABLET | Refills: 3 | Status: SHIPPED | OUTPATIENT
Start: 2019-07-22 | End: 2019-11-14

## 2019-07-22 RX ORDER — LISINOPRIL AND HYDROCHLOROTHIAZIDE 12.5; 2 MG/1; MG/1
1 TABLET ORAL DAILY
Qty: 90 TABLET | Refills: 3 | Status: SHIPPED | OUTPATIENT
Start: 2019-07-22 | End: 2020-08-26

## 2019-07-22 RX ORDER — ACETAMINOPHEN AND CODEINE PHOSPHATE 300; 30 MG/1; MG/1
1 TABLET ORAL EVERY 6 HOURS PRN
Refills: 0 | COMMUNITY
Start: 2019-07-15 | End: 2019-10-31

## 2019-07-22 RX ORDER — FLUTICASONE PROPIONATE 50 MCG
1 SPRAY, SUSPENSION (ML) NASAL DAILY
COMMUNITY
End: 2019-10-31 | Stop reason: SDUPTHER

## 2019-07-22 RX ORDER — OXYBUTYNIN CHLORIDE 10 MG/1
10 TABLET, EXTENDED RELEASE ORAL DAILY
Qty: 90 TABLET | Refills: 3 | Status: SHIPPED | OUTPATIENT
Start: 2019-07-22 | End: 2020-09-28 | Stop reason: SDUPTHER

## 2019-07-22 RX ORDER — NITROFURANTOIN 25; 75 MG/1; MG/1
100 CAPSULE ORAL 2 TIMES DAILY
Qty: 14 CAPSULE | Refills: 0 | Status: SHIPPED | OUTPATIENT
Start: 2019-07-22 | End: 2019-07-22 | Stop reason: SDUPTHER

## 2019-07-22 RX ORDER — PHENAZOPYRIDINE HYDROCHLORIDE 200 MG/1
200 TABLET, FILM COATED ORAL 3 TIMES DAILY PRN
Qty: 15 TABLET | Refills: 0 | Status: SHIPPED | OUTPATIENT
Start: 2019-07-22 | End: 2019-08-01

## 2019-07-22 RX ORDER — PHENAZOPYRIDINE HYDROCHLORIDE 200 MG/1
200 TABLET, FILM COATED ORAL 3 TIMES DAILY PRN
Qty: 15 TABLET | Refills: 0 | Status: SHIPPED | OUTPATIENT
Start: 2019-07-22 | End: 2019-07-22 | Stop reason: SDUPTHER

## 2019-07-22 NOTE — TELEPHONE ENCOUNTER
----- Message from Sena Mccoy sent at 7/22/2019  4:08 PM CDT -----  Contact: Patient  Type: Needs Medical Advice    Who Called:  Alexandria, patient  Symptoms (please be specific):  N/A  How long has patient had these symptoms:  N/A  Pharmacy name and phone #:    [id number]  [place] - [address]  [address] [place] [address]  Phone: [phone] Fax: [phone]  Best Call Back Number: [phone]  Additional Information: Calling because she was seen today and her prescriptions for bladder infection were sent to [facility], they need to go to [facility]. Please advise. Thanks.

## 2019-07-22 NOTE — PROGRESS NOTES
Subjective:       Patient ID: Alexandria Estrada is a 69 y.o. female.    Chief Complaint: Urinary Tract Infection and Annual Exam    HPI: 70 yo WF-in for yearly visit--?  UTI--symptoms started 3 days ago--+frequency, + urgency, +retention, no dysuria, +odor no hematuria pyuria.  Occasional UTIs in the past history renal stone 25 years ago.GYN  Neg    ROS:  Skin: no psoriasis, eczema, skin cancer skin lesion +history skin cancer-frozen  HEENT: ,no ocular pain, blurred vision, diplopia, epistaxis, hoarseness no change in voice thyroid trouble  on Flonase due to postnasal drip  Lung: No pneumonia, asthma, Tb, wheezing, SOB, no smoking  Heart: No chest pain, ankle edema, palpitations, MI, elaine murmur, +hypertension blood pressure 148/84 on lisinopril HCT,no hyperlipidemia  Abdomen: No nausea, vomiting,  diarrhea, constipation, ulcers, hepatitis, gallbladder disease, melena, hematochezia, hematemesis  :  Has overactive bladder no recent UTI  history renal stone 20 years ago-on oxybutynin for bladder spasm  MS: no fractures, + O/A--needs hands severe scoliosis lumbar spine, right heel see history of present,no lupus, rheumatoid, gout   Neuro: No dizziness, LOC, seizures   No diabetes, no anemia, no anxiety, no depression   , 2 daughters, work retired ,  lives with  father    Objective:   Physical Exam:  General: Well nourished, well developed, no acute distress +thin  Skin: No lesions  HEENT: Eyes PERRLA, EOM intact, nose clear discharge, throat none rythematous ears clear fluid  NECK: Supple, no bruits, No JVD, no nodes  Lungs: Clear, no rales, rhonchi, wheezing   Heart: Regular rate and rhythm, no murmurs, gallops, or rubs  Abdomen: flat, bowel sounds positive, no tenderness, or organomegaly some pressure in suprapubic area and groin area  MS: Severe scoliosis of the lumbar spine and thoracic spine, pain right heel mainly at the base of the heel and Achilles area more with weight-bearing then with  palpation or flexion extension or inversion or eversion of the foot more with weight-bearing  Neuro: Alert, CN intact, oriented X 3  Extremities: No cyanosis, clubbing, or edema         Assessment:       1. UTI symptoms    2. HTN (hypertension), benign    3. Scoliosis, unspecified scoliosis type, unspecified spinal region    4. Lumbosacral strain, initial encounter    5. Sciatica, unspecified laterality    6. History of nephrolithiasis        Plan:       UTI symptoms  -     POCT URINE DIPSTICK WITHOUT MICROSCOPE    HTN (hypertension), benign    Scoliosis, unspecified scoliosis type, unspecified spinal region    Lumbosacral strain, initial encounter    Sciatica, unspecified laterality    History of nephrolithiasis    Other orders  -     oxybutynin (DITROPAN-XL) 10 MG 24 hr tablet; Take 1 tablet (10 mg total) by mouth once daily.  Dispense: 90 tablet; Refill: 3  -     lisinopril-hydrochlorothiazide (PRINZIDE,ZESTORETIC) 20-12.5 mg per tablet; Take 1 tablet by mouth once daily.  Dispense: 90 tablet; Refill: 3  -     alendronate (FOSAMAX) 70 MG tablet; Take 1 tablet (70 mg total) by mouth every 7 days.  Dispense: 12 tablet; Refill: 3  -     nitrofurantoin, macrocrystal-monohydrate, (MACROBID) 100 MG capsule; Take 1 capsule (100 mg total) by mouth 2 (two) times daily.  Dispense: 14 capsule; Refill: 0  -     phenazopyridine (PYRIDIUM) 200 MG tablet; Take 1 tablet (200 mg total) by mouth 3 (three) times daily as needed.  Dispense: 15 tablet; Refill: 0      UTI--microscopic too numerous count WBCs RBCs 10-20--loaded with bacteria--Macrobid 100 b.i.d. times 7 days--peridium 200 t.i.d. x5 days--redo UA in 2 weeks be sure UTI has cleared  Blood pressure 148/84--patient on lisinopril HCT---needs to have arm blood pressure cuff check blood pressure daily needs blood pressure 140/90 or less--recheck blood pressure prior to sending home today--if still eye come back in 2 weeks if still elevated will add amlodipine 2.5  Low-sodium  diet  Patient needs lab in September CBCs CMP lipid only  Moist heat/theragesic/range of motion exercise--has severe scoliosis of the back history of lumbosacral strain with sciatic  Health maintenance needs tetanus/Pneumovax due in 1 week had mammogram 03/25/2019

## 2019-08-05 ENCOUNTER — OFFICE VISIT (OUTPATIENT)
Dept: PRIMARY CARE CLINIC | Facility: CLINIC | Age: 69
End: 2019-08-05
Payer: MEDICARE

## 2019-08-05 VITALS
HEIGHT: 66 IN | OXYGEN SATURATION: 94 % | SYSTOLIC BLOOD PRESSURE: 123 MMHG | WEIGHT: 124 LBS | RESPIRATION RATE: 19 BRPM | DIASTOLIC BLOOD PRESSURE: 81 MMHG | HEART RATE: 68 BPM | BODY MASS INDEX: 19.93 KG/M2 | TEMPERATURE: 98 F

## 2019-08-05 DIAGNOSIS — Z87.440 HISTORY OF UTI: ICD-10-CM

## 2019-08-05 DIAGNOSIS — N32.81 OVERACTIVE BLADDER: Primary | ICD-10-CM

## 2019-08-05 DIAGNOSIS — E78.5 HYPERLIPIDEMIA, UNSPECIFIED HYPERLIPIDEMIA TYPE: ICD-10-CM

## 2019-08-05 DIAGNOSIS — M54.30 SCIATICA, UNSPECIFIED LATERALITY: ICD-10-CM

## 2019-08-05 DIAGNOSIS — M41.9 SCOLIOSIS, UNSPECIFIED SCOLIOSIS TYPE, UNSPECIFIED SPINAL REGION: Chronic | ICD-10-CM

## 2019-08-05 DIAGNOSIS — G47.62 NOCTURNAL LEG CRAMPS: ICD-10-CM

## 2019-08-05 DIAGNOSIS — Z87.442 HISTORY OF NEPHROLITHIASIS: ICD-10-CM

## 2019-08-05 LAB
BILIRUB SERPL-MCNC: ABNORMAL MG/DL
BLOOD URINE, POC: NEGATIVE
COLOR, POC UA: YELLOW
GLUCOSE UR QL STRIP: ABNORMAL
KETONES UR QL STRIP: ABNORMAL
LEUKOCYTE ESTERASE URINE, POC: ABNORMAL
NITRITE, POC UA: ABNORMAL
PH, POC UA: 7
PROTEIN, POC: ABNORMAL
SPECIFIC GRAVITY, POC UA: 1.01
UROBILINOGEN, POC UA: ABNORMAL

## 2019-08-05 PROCEDURE — 3079F PR MOST RECENT DIASTOLIC BLOOD PRESSURE 80-89 MM HG: ICD-10-PCS | Mod: S$GLB,,, | Performed by: FAMILY MEDICINE

## 2019-08-05 PROCEDURE — 1101F PT FALLS ASSESS-DOCD LE1/YR: CPT | Mod: S$GLB,,, | Performed by: FAMILY MEDICINE

## 2019-08-05 PROCEDURE — 81002 POCT URINE DIPSTICK WITHOUT MICROSCOPE: ICD-10-PCS | Mod: S$GLB,,, | Performed by: FAMILY MEDICINE

## 2019-08-05 PROCEDURE — 99999 PR PBB SHADOW E&M-EST. PATIENT-LVL III: CPT | Mod: PBBFAC,,, | Performed by: FAMILY MEDICINE

## 2019-08-05 PROCEDURE — 3074F SYST BP LT 130 MM HG: CPT | Mod: S$GLB,,, | Performed by: FAMILY MEDICINE

## 2019-08-05 PROCEDURE — 99213 PR OFFICE/OUTPT VISIT, EST, LEVL III, 20-29 MIN: ICD-10-PCS | Mod: 25,S$GLB,, | Performed by: FAMILY MEDICINE

## 2019-08-05 PROCEDURE — 99999 PR PBB SHADOW E&M-EST. PATIENT-LVL III: ICD-10-PCS | Mod: PBBFAC,,, | Performed by: FAMILY MEDICINE

## 2019-08-05 PROCEDURE — 1101F PR PT FALLS ASSESS DOC 0-1 FALLS W/OUT INJ PAST YR: ICD-10-PCS | Mod: S$GLB,,, | Performed by: FAMILY MEDICINE

## 2019-08-05 PROCEDURE — 99213 OFFICE O/P EST LOW 20 MIN: CPT | Mod: 25,S$GLB,, | Performed by: FAMILY MEDICINE

## 2019-08-05 PROCEDURE — 3074F PR MOST RECENT SYSTOLIC BLOOD PRESSURE < 130 MM HG: ICD-10-PCS | Mod: S$GLB,,, | Performed by: FAMILY MEDICINE

## 2019-08-05 PROCEDURE — 3079F DIAST BP 80-89 MM HG: CPT | Mod: S$GLB,,, | Performed by: FAMILY MEDICINE

## 2019-08-05 PROCEDURE — 81002 URINALYSIS NONAUTO W/O SCOPE: CPT | Mod: S$GLB,,, | Performed by: FAMILY MEDICINE

## 2019-08-05 RX ORDER — GABAPENTIN 100 MG/1
100 CAPSULE ORAL 3 TIMES DAILY
Qty: 90 CAPSULE | Refills: 11 | Status: SHIPPED | OUTPATIENT
Start: 2019-08-05 | End: 2019-11-14

## 2019-08-05 NOTE — PROGRESS NOTES
Subjective:       Patient ID: Alexandria Estrada is a 69 y.o. female.    Chief Complaint: Follow-up (hypertension) and Urinary Tract Infection    HPI: 70 yo WF--f/u HTN and UTI -- BP checks overall good Occas 150 systolic but usually 120-130 and diastolic rarely over 90 working on low-salt diet but unable exercise.  +BM ambulating well --leg cramps all night long has to get out of bed and work amount.    ROS:  Skin: no psoriasis, eczema, skin cancer skin lesion +history skin cancer-clear right now   HEENT: ,no ocular pain, blurred vision, diplopia, epistaxis, hoarseness no change in voice thyroid trouble  Floaters also Flonase due to postnasal drip  Lung: No pneumonia, asthma, Tb, wheezing, SOB, no smoking  Heart: No chest pain, ankle edema, palpitations, MI, elaine murmur, +hypertension blood pressure 123/81 on lisinopril HCT,no hyperlipidemia  Abdomen: No nausea, vomiting,  diarrhea, constipation, ulcers, hepatitis, gallbladder disease, melena, hematochezia, hematemesis  :  Has overactive bladder + recent UTI needs Urine rechecked   history renal stone 20 years ago-on oxybutynin for bladder spasm  MS: no fractures, + O/A--needs hands severe scoliosis lumbar spine, right heel --nocturnal leg cramps   Neuro: No dizziness, LOC, seizures   No diabetes, no anemia, no anxiety, no depression   , 2 daughters, work retired ,  lives with  father    Objective:   Physical Exam:  General: Well nourished, well developed, no acute distress +thin  Skin: No lesions  HEENT: Eyes PERRLA, EOM intact, nose clear discharge, throat none rythematous ears clear fluid  NECK: Supple, no bruits, No JVD, no nodes  Lungs: Clear, no rales, rhonchi, wheezing   Heart: Regular rate and rhythm, no murmurs, gallops, or rubs  Abdomen: flat, bowel sounds positive, no tenderness, or organomegaly some pressure in suprapubic area and groin area  MS: Severe scoliosis of the lumbar spine and thoracic spine, pain right heel mainly at the base  of the heel and Achilles area more with weight-bearing then with palpation or flexion extension or inversion or eversion of the foot more with weight-bearing  Neuro: Alert, CN intact, oriented X 3  Extremities: No cyanosis, clubbing, or edema         Assessment:       1. Overactive bladder    2. History of UTI    3. Nocturnal leg cramps    4. History of nephrolithiasis    5. Scoliosis, unspecified scoliosis type, unspecified spinal region    6. Sciatica, unspecified laterality    7. Hyperlipidemia, unspecified hyperlipidemia type        Plan:       Overactive bladder  -     POCT urine dipstick without microscope    History of UTI  -     POCT urine dipstick without microscope    Nocturnal leg cramps  -     Comprehensive metabolic panel; Future; Expected date: 08/05/2019  -     Magnesium; Future; Expected date: 08/05/2019    History of nephrolithiasis    Scoliosis, unspecified scoliosis type, unspecified spinal region    Sciatica, unspecified laterality    Hyperlipidemia, unspecified hyperlipidemia type  -     CBC auto differential; Future; Expected date: 11/05/2019  -     Comprehensive metabolic panel; Future; Expected date: 11/05/2019  -     Lipid panel; Future; Expected date: 11/05/2019    Other orders  -     gabapentin (NEURONTIN) 100 MG capsule; Take 1 capsule (100 mg total) by mouth 3 (three) times daily.  Dispense: 90 capsule; Refill: 11      In mainly to recheck nurine due recent UTI --U/A now   Blood pressure 123/81--patient on lisinopril HCT---needs to have arm blood pressure cuff check blood pressure daily needs blood pressure 140/90 or less--recheck blood pressure prior to sending home today--if still eye come back in 2 weeks if still elevated will add amlodipine 2.5  Low-sodium low-fat diet diet  Will hold atorvastatin for cholesterol for now   Patient needs lab in Nov CBCs CMP lipid only--due muscle cramps night CMP and MG now check Ca++, Mg + K+ --trial quinine water gabapentin 100 mg t.i.d.  Moist  heat/theragesic/range of motion exercise--has severe scoliosis of the back history of lumbosacral strain with sciatic  Health maintenance needs tetanus/Pneumovax due in 1 week had mammogram 03/25/2019

## 2019-10-31 ENCOUNTER — OFFICE VISIT (OUTPATIENT)
Dept: PRIMARY CARE CLINIC | Facility: CLINIC | Age: 69
End: 2019-10-31
Payer: MEDICARE

## 2019-10-31 ENCOUNTER — TELEPHONE (OUTPATIENT)
Dept: PAIN MEDICINE | Facility: CLINIC | Age: 69
End: 2019-10-31

## 2019-10-31 VITALS
WEIGHT: 125 LBS | TEMPERATURE: 98 F | SYSTOLIC BLOOD PRESSURE: 140 MMHG | OXYGEN SATURATION: 97 % | HEIGHT: 66 IN | HEART RATE: 76 BPM | RESPIRATION RATE: 16 BRPM | BODY MASS INDEX: 20.09 KG/M2 | DIASTOLIC BLOOD PRESSURE: 90 MMHG

## 2019-10-31 DIAGNOSIS — K21.00 GASTROESOPHAGEAL REFLUX DISEASE WITH ESOPHAGITIS: ICD-10-CM

## 2019-10-31 DIAGNOSIS — M41.9 SCOLIOSIS, UNSPECIFIED SCOLIOSIS TYPE, UNSPECIFIED SPINAL REGION: Chronic | ICD-10-CM

## 2019-10-31 DIAGNOSIS — M54.30 SCIATICA, UNSPECIFIED LATERALITY: ICD-10-CM

## 2019-10-31 DIAGNOSIS — Z23 NEED FOR PROPHYLACTIC VACCINATION AND INOCULATION AGAINST INFLUENZA: Primary | ICD-10-CM

## 2019-10-31 PROCEDURE — 99999 PR PBB SHADOW E&M-EST. PATIENT-LVL IV: ICD-10-PCS | Mod: PBBFAC,,, | Performed by: FAMILY MEDICINE

## 2019-10-31 PROCEDURE — 3077F SYST BP >= 140 MM HG: CPT | Mod: S$GLB,,, | Performed by: FAMILY MEDICINE

## 2019-10-31 PROCEDURE — 3080F DIAST BP >= 90 MM HG: CPT | Mod: S$GLB,,, | Performed by: FAMILY MEDICINE

## 2019-10-31 PROCEDURE — 99999 PR PBB SHADOW E&M-EST. PATIENT-LVL IV: CPT | Mod: PBBFAC,,, | Performed by: FAMILY MEDICINE

## 2019-10-31 PROCEDURE — 1101F PT FALLS ASSESS-DOCD LE1/YR: CPT | Mod: S$GLB,,, | Performed by: FAMILY MEDICINE

## 2019-10-31 PROCEDURE — 3080F PR MOST RECENT DIASTOLIC BLOOD PRESSURE >= 90 MM HG: ICD-10-PCS | Mod: S$GLB,,, | Performed by: FAMILY MEDICINE

## 2019-10-31 PROCEDURE — 90662 IIV NO PRSV INCREASED AG IM: CPT | Mod: S$GLB,,, | Performed by: FAMILY MEDICINE

## 2019-10-31 PROCEDURE — G0008 FLU VACCINE - HIGH DOSE (65+) PRESERVATIVE FREE IM: ICD-10-PCS | Mod: S$GLB,,, | Performed by: FAMILY MEDICINE

## 2019-10-31 PROCEDURE — 90662 FLU VACCINE - HIGH DOSE (65+) PRESERVATIVE FREE IM: ICD-10-PCS | Mod: S$GLB,,, | Performed by: FAMILY MEDICINE

## 2019-10-31 PROCEDURE — 99213 PR OFFICE/OUTPT VISIT, EST, LEVL III, 20-29 MIN: ICD-10-PCS | Mod: 25,S$GLB,, | Performed by: FAMILY MEDICINE

## 2019-10-31 PROCEDURE — 3077F PR MOST RECENT SYSTOLIC BLOOD PRESSURE >= 140 MM HG: ICD-10-PCS | Mod: S$GLB,,, | Performed by: FAMILY MEDICINE

## 2019-10-31 PROCEDURE — 99213 OFFICE O/P EST LOW 20 MIN: CPT | Mod: 25,S$GLB,, | Performed by: FAMILY MEDICINE

## 2019-10-31 PROCEDURE — G0008 ADMIN INFLUENZA VIRUS VAC: HCPCS | Mod: S$GLB,,, | Performed by: FAMILY MEDICINE

## 2019-10-31 PROCEDURE — 1101F PR PT FALLS ASSESS DOC 0-1 FALLS W/OUT INJ PAST YR: ICD-10-PCS | Mod: S$GLB,,, | Performed by: FAMILY MEDICINE

## 2019-10-31 RX ORDER — FLUTICASONE PROPIONATE 50 MCG
1 SPRAY, SUSPENSION (ML) NASAL DAILY
Qty: 16 G | Refills: 5 | Status: SHIPPED | OUTPATIENT
Start: 2019-10-31 | End: 2020-09-28 | Stop reason: SDUPTHER

## 2019-10-31 RX ORDER — SUCRALFATE 1 G/1
TABLET ORAL
Qty: 30 TABLET | Refills: 0 | Status: SHIPPED | OUTPATIENT
Start: 2019-10-31 | End: 2019-11-14

## 2019-10-31 RX ORDER — OMEPRAZOLE 40 MG/1
40 CAPSULE, DELAYED RELEASE ORAL DAILY
Qty: 30 CAPSULE | Refills: 5 | Status: SHIPPED | OUTPATIENT
Start: 2019-10-31 | End: 2019-11-05 | Stop reason: SDUPTHER

## 2019-10-31 RX ORDER — FLUTICASONE PROPIONATE 50 MCG
1 SPRAY, SUSPENSION (ML) NASAL DAILY
Status: CANCELLED | OUTPATIENT
Start: 2019-10-31

## 2019-10-31 NOTE — TELEPHONE ENCOUNTER
Spoke with patient about making an appointment with IPM. Patient stated she has chronic pain r/t scoliosis. She would like to make an appointment to discuss if she could possibly be given a shot to ease her pain. Appointment has been made for the patient. No further issues were discussed.

## 2019-10-31 NOTE — TELEPHONE ENCOUNTER
----- Message from Carlos A Bailon sent at 10/31/2019  3:47 PM CDT -----  Contact: carlos a   Needs appt with Dr Argueta [id number] [phone]

## 2019-10-31 NOTE — TELEPHONE ENCOUNTER
----- Message from Makeda Espinoza sent at 10/31/2019  9:44 AM CDT -----  Contact: self/[phone]  Pt called in regards to getting a flu shot and getting a new Rx for Flonase.     EXPRESS SCRIPT  PLEASE ADVISE

## 2019-10-31 NOTE — PROGRESS NOTES
Verified pt ID using name and . NKDA. Administered Influenza High Dose vaccine in left deltoid per physician order using aseptic technique. Aspirated and no blood return noted. Pt tolerated well with no adverse reactions noted.

## 2019-10-31 NOTE — PROGRESS NOTES
Subjective:       Patient ID: Alexandria Estrada is a 69 y.o. female.    Chief Complaint: Back Pain; Medication Refill (Nasal spray, also requesting her oxybutynin 10 mg to be increased); and Gastroesophageal Reflux (Patient would like a RX for acid reflux.)    HPI: 68 yo WF in for acid reflux and refill.  Had gastritis x2 years-- feels burning going up the lower midsternal area--occurs b.i.d.---was on ranitidine OTC.  Has not been on omeprazole.    ROS:  Skin: no psoriasis, eczema, skin cancer skin lesion +history skin cancer  HEENT: ,no ocular pain, blurred vision, diplopia, epistaxis, hoarseness no change in voice thyroid trouble  Floaters also Flonase due to postnasal drip  Lung: No pneumonia, asthma, Tb, wheezing, SOB, no smoking  Heart: No chest pain, ankle edema, palpitations, MI, elaine murmur, +hypertension blood pressure 140/90  on lisinopril HCT,no hyperlipidemia  Abdomen: No nausea, vomiting,  diarrhea, constipation, ulcers, hepatitis, gallbladder disease, melena, hematochezia, hematemesis  :  Has overactive bladder + hx UTI OK now    history renal stone 20 years ago-on oxybutynin for bladder spasm  MS: no fractures, + O/A--needs hands severe scoliosis lumbar spine, right heel --nocturnal leg cramps   Neuro: No dizziness, LOC, seizures   No diabetes, no anemia, no anxiety, no depression   , 2 daughters, work retired ,  lives with  father    Objective:   Physical Exam:  General: Well nourished, well developed, no acute distress +thin  Skin: No lesions  HEENT: Eyes PERRLA, EOM intact, nose clear discharge, throat none rythematous ears clear fluid  NECK: Supple, no bruits, No JVD, no nodes  Lungs: Clear, no rales, rhonchi, wheezing   Heart: Regular rate and rhythm, no murmurs, gallops, or rubs  Abdomen: flat, bowel sounds positive, no tenderness, or organomegaly some pressure in suprapubic area and groin area  MS: Severe scoliosis of the lumbar spine and thoracic spine, pain right heel mainly  at the base of the heel and Achilles area more with weight-bearing then with palpation or flexion extension or inversion or eversion of the foot more with weight-bearing  Neuro: Alert, CN intact, oriented X 3  Extremities: No cyanosis, clubbing, or edema         Assessment:       1. Need for prophylactic vaccination and inoculation against influenza    2. Gastroesophageal reflux disease with esophagitis    3. Scoliosis, unspecified scoliosis type, unspecified spinal region    4. Sciatica, unspecified laterality        Plan:       Need for prophylactic vaccination and inoculation against influenza    Gastroesophageal reflux disease with esophagitis    Scoliosis, unspecified scoliosis type, unspecified spinal region    Sciatica, unspecified laterality      patient with GERD--with some esophagitis--patient taking Zantac---will switch to omeprazole 40 mg q.h.s. Carafate 1 g 1 hr a.c. breakfast 1 hr a.c lunch, 1 hr a.c. Supper--Tums 2 p.o. 1 hr p.c. breakfast 1 hr p.c. Supper  Patient needs to avoid smoking alcohol caffeine-coffee coke chololate and tea-- NSAIDs, carbonated drinks and stressed  Patient also asking about possible epidural steroid injection to the left lower back has severe scoliosis did have an MRI the back about a year ago will get copy of the MRI and sent patient to pain clinic for evaluation for pop purple epidural steroid injection  One stomach as well on on omeprazole may be able tolerate NSAIDs  Has hypertension blood pressure appears stable needs to check blood pressure daily be sure blood pressure stays 140/90 or less  Lab due in February  Health maintenance tetanus pneumonia shot  Mammogram  Had flu shot today

## 2019-11-05 ENCOUNTER — PATIENT OUTREACH (OUTPATIENT)
Dept: ADMINISTRATIVE | Facility: HOSPITAL | Age: 69
End: 2019-11-05

## 2019-11-05 NOTE — PROGRESS NOTES
Attempted to contact patient to discuss/schedule overdue HM.   LMOM for return call.   Patient scheduled for Mammogram 11/06/19.

## 2019-11-05 NOTE — TELEPHONE ENCOUNTER
----- Message from Tammi Perez sent at 11/5/2019  3:29 PM CST -----  Contact: Pt self Mobile/Home [phone]   Patient is calling for an RX refill or new RX.  Is this a refill or new RX:  Refill  RX name and strength: omeprazole (PRILOSEC) 40 MG capsule  Directions (copy/paste from chart):  N/A  Is this a 30 day or 90 day RX:  90  Local pharmacy or mail order pharmacy:  [facility] - [address]  Pharmacy name and phone  # [phone], Fax# [phone]  Comment: Patient said the last time her script was filled it was sent in for a thirty day supply when it should have been a ninety day supply.

## 2019-11-07 ENCOUNTER — TELEPHONE (OUTPATIENT)
Dept: PRIMARY CARE CLINIC | Facility: CLINIC | Age: 69
End: 2019-11-07

## 2019-11-07 RX ORDER — OMEPRAZOLE 40 MG/1
40 CAPSULE, DELAYED RELEASE ORAL DAILY
Qty: 90 CAPSULE | Refills: 1 | Status: SHIPPED | OUTPATIENT
Start: 2019-11-07 | End: 2020-09-01

## 2019-11-07 NOTE — TELEPHONE ENCOUNTER
Spoke with patient, verbalized that Jose Miguel was calling her in regards to her health maintenance. Patient is overdue for a pneumonia and tetanus vaccine. Patient verbalized understanding and will take care of her vaccines at her next appointment.

## 2019-11-07 NOTE — TELEPHONE ENCOUNTER
----- Message from Vandana Raphael sent at 11/7/2019 12:01 PM CST -----  Contact: Patient [phone]  Patient is returning a phone call.  Who left a message for the patient: Jose Miguel  Does patient know what this is regarding:    Comments:     Please call and advise  Thank you

## 2019-11-11 ENCOUNTER — PATIENT OUTREACH (OUTPATIENT)
Dept: ADMINISTRATIVE | Facility: OTHER | Age: 69
End: 2019-11-11

## 2019-11-14 ENCOUNTER — OFFICE VISIT (OUTPATIENT)
Dept: PAIN MEDICINE | Facility: CLINIC | Age: 69
End: 2019-11-14
Payer: MEDICARE

## 2019-11-14 VITALS
DIASTOLIC BLOOD PRESSURE: 84 MMHG | HEART RATE: 70 BPM | BODY MASS INDEX: 20 KG/M2 | HEIGHT: 66 IN | WEIGHT: 124.44 LBS | SYSTOLIC BLOOD PRESSURE: 129 MMHG

## 2019-11-14 DIAGNOSIS — M79.18 MYOFASCIAL PAIN: ICD-10-CM

## 2019-11-14 DIAGNOSIS — M41.45 NEUROMUSCULAR SCOLIOSIS OF THORACOLUMBAR REGION: Primary | ICD-10-CM

## 2019-11-14 PROCEDURE — 3079F PR MOST RECENT DIASTOLIC BLOOD PRESSURE 80-89 MM HG: ICD-10-PCS | Mod: S$GLB,,, | Performed by: PAIN MEDICINE

## 2019-11-14 PROCEDURE — 1101F PR PT FALLS ASSESS DOC 0-1 FALLS W/OUT INJ PAST YR: ICD-10-PCS | Mod: S$GLB,,, | Performed by: PAIN MEDICINE

## 2019-11-14 PROCEDURE — 99999 PR PBB SHADOW E&M-EST. PATIENT-LVL III: CPT | Mod: PBBFAC,,, | Performed by: PAIN MEDICINE

## 2019-11-14 PROCEDURE — 3074F PR MOST RECENT SYSTOLIC BLOOD PRESSURE < 130 MM HG: ICD-10-PCS | Mod: S$GLB,,, | Performed by: PAIN MEDICINE

## 2019-11-14 PROCEDURE — 3079F DIAST BP 80-89 MM HG: CPT | Mod: S$GLB,,, | Performed by: PAIN MEDICINE

## 2019-11-14 PROCEDURE — 99999 PR PBB SHADOW E&M-EST. PATIENT-LVL III: ICD-10-PCS | Mod: PBBFAC,,, | Performed by: PAIN MEDICINE

## 2019-11-14 PROCEDURE — 99204 PR OFFICE/OUTPT VISIT, NEW, LEVL IV, 45-59 MIN: ICD-10-PCS | Mod: S$GLB,,, | Performed by: PAIN MEDICINE

## 2019-11-14 PROCEDURE — 99204 OFFICE O/P NEW MOD 45 MIN: CPT | Mod: S$GLB,,, | Performed by: PAIN MEDICINE

## 2019-11-14 PROCEDURE — 3074F SYST BP LT 130 MM HG: CPT | Mod: S$GLB,,, | Performed by: PAIN MEDICINE

## 2019-11-14 PROCEDURE — 1101F PT FALLS ASSESS-DOCD LE1/YR: CPT | Mod: S$GLB,,, | Performed by: PAIN MEDICINE

## 2019-11-14 RX ORDER — DICLOFENAC SODIUM 10 MG/G
2 GEL TOPICAL 4 TIMES DAILY PRN
Qty: 100 G | Refills: 5 | Status: SHIPPED | OUTPATIENT
Start: 2019-11-14 | End: 2020-09-28 | Stop reason: SDUPTHER

## 2019-11-14 NOTE — LETTER
November 14, 2019      Dwight Meehan MD  [address] [place] [address]           [facility]  [address], [facility] [address]  [place] [address]  Phone: [phone]  Fax: [phone]          Patient: Alexandria Estrada   MR Number: [id number]   YOB: 1950   Date of Visit: 11/14/2019       Dear Dr. Dwight Meehan:    Thank you for referring Alexandria Estrada to me for evaluation. Attached you will find relevant portions of my assessment and plan of care.    If you have questions, please do not hesitate to call me. I look forward to following Alexandria Estrada along with you.    Sincerely,    Jose Argueta Jr., MD    Enclosure  CC:  No Recipients    If you would like to receive this communication electronically, please contact [email or web address] or [phone] to request more information on [employer] access.    For providers and/or their staff who would like to refer a patient to [facility], please contact us through our one-stop-shop provider referral line, [facility], at [phone].    If you feel you have received this communication in error or would no longer like to receive these types of communications, please e-mail [email or web address]

## 2019-11-14 NOTE — PROGRESS NOTES
Subjective:     Patient ID: Alexandria Estrada is a 69 y.o. female    Chief Complaint: Low-back Pain (r/t scoliosis)      Referred by: Dwight Meehan MD      HPI:    Initial Encounter (11/14/19):  Alexandria Estrada is a 69 y.o. female who presents today with chronic bilateral low back pain. Patient has history of severe thoracolumbar scoliosis.  She localizes her pain to the left lower thoracic paraspinal region and the right lower thoracic paraspinal region.  It is difficult accurately assess the exact anatomic location of her pain given the curvature of her spine.  She states that the pain is mostly there when standing or walking for prolonged periods of time.  Bending forward and bending backwards tend to alleviate her pain to some degree.  She denies any associated numbness, tingling, weakness, bowel bladder dysfunction.  The pain does not radiate.   This pain is described in detail below.    Physical Therapy:  Yes.  Multiple times.  Minimal relief noted.    Non-pharmacologic Treatment:  Rest helps         · TENS?  No    Pain Medications:         · Currently taking:  OTC NSAIDs, Aspercreme    · Has tried in the past:  Gabapentin, Tylenol    · Has not tried:  Opioids, Muscle relaxants, TCAs, SNRIs    Blood thinners:  None    Interventional Therapies:  None    Relevant Surgeries:  None    Affecting sleep?  Yes    Affecting daily activities? yes    Depressive symptoms? no          · SI/HI? No    Work status: Retired    Pain Scores:    Best:       2/10  Worst:     10/10  Usually:   7/10  Today:    4/10    Review of Systems   Constitutional: Negative for activity change, appetite change, chills, fatigue, fever and unexpected weight change.   HENT: Negative for hearing loss.    Eyes: Negative for visual disturbance.   Respiratory: Negative for chest tightness and shortness of breath.    Cardiovascular: Negative for chest pain.   Gastrointestinal: Negative for abdominal pain, constipation, diarrhea, nausea and  vomiting.   Genitourinary: Negative for difficulty urinating.   Musculoskeletal: Positive for arthralgias, back pain and myalgias. Negative for gait problem and neck pain.   Skin: Negative for rash.   Neurological: Negative for dizziness, weakness, light-headedness, numbness and headaches.   Psychiatric/Behavioral: Positive for sleep disturbance. Negative for hallucinations and suicidal ideas. The patient is not nervous/anxious.        Past Medical History:   Diagnosis Date    Hypertension        Past Surgical History:   Procedure Laterality Date    BREAST BIOPSY Left     Yrs ago    BREAST SURGERY      right breast lump     SECTION         Social History     Socioeconomic History    Marital status:      Spouse name: Not on file    Number of children: Not on file    Years of education: Not on file    Highest education level: Not on file   Occupational History    Occupation:  [employer]      Employer: [employer]   Social Needs    Financial resource strain: Not on file    Food insecurity:     Worry: Not on file     Inability: Not on file    Transportation needs:     Medical: Not on file     Non-medical: Not on file   Tobacco Use    Smoking status: Former Smoker    Smokeless tobacco: Never Used   Substance and Sexual Activity    Alcohol use: Yes     Alcohol/week: 2.0 standard drinks     Types: 1 Glasses of wine, 1 Cans of beer per week     Comment: rarely    Drug use: No    Sexual activity: Yes     Partners: Male     Birth control/protection: None   Lifestyle    Physical activity:     Days per week: Not on file     Minutes per session: Not on file    Stress: Not on file   Relationships    Social connections:     Talks on phone: Not on file     Gets together: Not on file     Attends Ep[employer] service: Not on file     Active member of club or organization: Not on file     Attends meetings of clubs or organizations: Not on file     Relationship status: Not on file   Other  "Topics Concern    Not on file   Social History Narrative    Not on file       Review of patient's allergies indicates:   Allergen Reactions    No known drug allergies        Current Outpatient Medications on File Prior to Visit   Medication Sig Dispense Refill    fluticasone propionate (FLONASE) 50 mcg/actuation nasal spray 1 spray (50 mcg total) by Each Nostril route once daily. 16 g 5    lisinopril-hydrochlorothiazide (PRINZIDE,ZESTORETIC) 20-12.5 mg per tablet Take 1 tablet by mouth once daily. 90 tablet 3    omeprazole (PRILOSEC) 40 MG capsule Take 1 capsule (40 mg total) by mouth once daily. 90 capsule 1    oxybutynin (DITROPAN-XL) 10 MG 24 hr tablet Take 1 tablet (10 mg total) by mouth once daily. 90 tablet 3    [DISCONTINUED] alendronate (FOSAMAX) 70 MG tablet Take 1 tablet (70 mg total) by mouth every 7 days. (Patient not taking: Reported on 10/31/2019) 12 tablet 3    [DISCONTINUED] gabapentin (NEURONTIN) 100 MG capsule Take 1 capsule (100 mg total) by mouth 3 (three) times daily. (Patient not taking: Reported on 10/31/2019) 90 capsule 11    [DISCONTINUED] sucralfate (CARAFATE) 1 gram tablet 1 hr a.c. breakfast, 1 hr a.c. lunch, 1 hr a.c. supper should also take 2 Tums 1 hr p.c. breakfast 1 hr p.c. lunch can get Tums OTC times 10 days 30 tablet 0     No current facility-administered medications on file prior to visit.        Objective:      /84 (BP Location: Left arm, Patient Position: Sitting, BP Method: Medium (Automatic))   Pulse 70   Ht 5' 6" (1.676 m)   Wt 56.4 kg (124 lb 7.2 oz)   BMI 20.09 kg/m²     Exam:  GEN:  Well developed, well nourished.  No acute distress.  Normal pain behavior.  HEENT:  No trauma.  Mucous membranes moist.  Nares patent bilaterally.  PSYCH: Normal affect. Thought content appropriate.  CHEST:  Breathing symmetric.  No audible wheezing.  ABD: Soft, non-distended.  SKIN:  Warm, pink, dry.  No rash on exposed areas.    EXT:  No cyanosis, clubbing, or edema.  No " color change or changes in nail or hair growth.  NEURO/MUSCULOSKELETAL:  Fully alert, oriented, and appropriate. Speech normal aysha. No cranial nerve deficits.   Gait:  Normal.  No trendelenburg sign bilaterally.   Motor Strength: 5/5 motor strength throughout lower extremities.   Sensory:  No sensory deficit in the lower extremities.   Reflexes:  1 + and symmetric throughout.  Downgoing Babinski's bilaterally.  No clonus or spasticity.  L-Spine:  Full ROM without pain on flexion or extension. Positive pain with axial/facet loading bilaterally (but patient specified as this is not a portion of her usual pain).  Negative SLR bilaterally.    No TTP over lumbar paraspinals, bilateral SI joints, hips, piriformis muscles, or GTB.      Very prominent thoracolumbar curvature related to scoliosis  Right lower ribs palpated and felt to be in very close proximity to right iliac crest    Imaging:  Narrative     EXAMINATION:  XR LUMBAR SPINE COMPLETE 5 VIEW    CLINICAL HISTORY:  Low back pain, <6wks, no red flags, no prior management;  Low back pain    TECHNIQUE:  Five views of the lumbar spine were performed.    COMPARISON:  Radiographs 12/28/2017    FINDINGS:  Alignment: Severe thoracolumbar levo CIS without definite fracture or listhesis seen.    Vertebrae: Vertebral body heights are maintained.  No suspicious appearing lytic or blastic lesions.    Discs and facets: Multilevel degenerative disc space loss and spondylosis.  Two level facet osteoarthropathy.    Miscellaneous: Normal sacroiliac joints.   Impression       1. Severe thoracolumbar levoscoliosis with degenerative disc and facet changes redemonstrated.  No acute abnormality identified.      Electronically signed by: Tyler Monge II, MD  Date: 04/02/2019  Time: 08:52       Narrative     MRI of the thoracic spine without contrast.    History: Scoliosis, unspecified.  Back strain.  Sciatica.    Date of exam: February 6, 2018    Technique: Axial T1 and T2 sequences,  sagittal T1, T2, and STIR sequences were obtained.    Findings:  No fractures are seen.  Severe dextroscoliosis, thoracolumbar.       The thoracic cord appearance is normal.  No areas of abnormal signal.  The central canal and foramina are patent throughout no areas of encroachment.    Facet osteoarthropathy noted multilevel.  ===============================   Impression          1.  Multilevel facet osteoarthropathy with severe scoliosis.    2.  No encroachment of the neural pathways or abnormality of the thoracic spinal cord identified.      Electronically signed by: MARCOS HERNANDEZ MD  Date: 02/07/18  Time: 10:19          Narrative     MRI of the lumbar spine without contrast.    History: Scoliosis.  Low back strain.  Sciatica.     Date of Exam: January 30, 2018    Technique: Sagittal T1, T2, and STIR sequences, coronal T2, and axial T1 and T2 sequences without contrast.    Findings: Severe levoscoliosis centered at the L3 level.      The cord ends at L1 level, normal appearance of the cauda equina.  There is normal marrow signal throughout.  No fractures or destructive bone changes are seen.  Retroperitoneal soft tissues that are visualized are within normal limits.  No adenopathy, aneurysm or mass identified.    At T12-L1, mild disc desiccation, normal disc height.  Normal central canal, foramina, degenerative facet changes.    At L1-2, mild disc space loss with disc desiccation.  Focal lateral canal disc herniation 4 mm, a contained protrusion.  Large central canal.  Normal foramina.  Facet degeneration bilaterally.    At L2-3, moderate disc space loss, normal central canal, foramina bilateral facet degeneration.       At L3-4, severe disc space loss with disc desiccation.  The combination of scoliosis and mild facet hypertrophy causes encroachment on the lateral recess on the right displacing nerve roots.  Foramina appear adequate.  Facets show hypertrophic degeneration.    At L4-5,  normal disc height with disc  desiccation.  Broad-based herniation can't make IT asymmetrical protrusion 4 mm thick, encroaching on the left side of the canal, no definite nerve root displacement.  The canal diameter 10 mm minimal.  Normal foramina.  Facet degeneration bilaterally.    At L5-S1, normal disc height, normal central canal and foramina.  Degenerative facet bilaterally.    Normal sacroiliac joints.  ==================================   Impression          1.  Severe scoliosis, centered at L3.    2.  Multilevel discogenic disease, varying degrees of central canal stenosis as described.    2.  Multilevel facet osteoarthropathy with lateral recess and proximal foraminal encroachment on the right.      Electronically signed by: MARCOS HERNANDEZ MD  Date: 01/30/18  Time: 15:42              Assessment:       Encounter Diagnoses   Name Primary?    Neuromuscular scoliosis of thoracolumbar region Yes    Myofascial pain          Plan:       Alexandria MELGAR was seen today for low-back pain.    Diagnoses and all orders for this visit:    Neuromuscular scoliosis of thoracolumbar region  -     diclofenac sodium (VOLTAREN) 1 % Gel; Apply 2 g topically 4 (four) times daily as needed.    Myofascial pain  -     diclofenac sodium (VOLTAREN) 1 % Gel; Apply 2 g topically 4 (four) times daily as needed.        Alexandria Estrada is a 69 y.o. female with chronic bilateral lower thoracic/upper lumbar back pain. Exact etiology of pain unclear but is likely related to her scoliosis.  Low suspicion for radiculopathy or spinal stenosis.  May have some degree of facet mediated pain, however her pain appears to be located much more laterally than the facet joints.  I suspect that a large degree of her pain is related to soft tissue strain or impingement.    1.  Pertinent imaging studies reviewed by me. Imaging results were discussed with patient.  2.  We discussed that I do not feel interventional procedures are likely to be of benefit.  I am reluctant to perform trigger  point injections as her lung fields seem to encompass a large area of where her pain is.  Low likelihood that she would benefit from epidural injections or radiofrequency ablation.  3.  Okay to utilize oral NSAIDs on a limited basis if needed.  4.  Start Voltaren gel 4 times a day as needed to affected areas.  5.  Return to clinic as needed.

## 2019-11-26 ENCOUNTER — PES CALL (OUTPATIENT)
Dept: ADMINISTRATIVE | Facility: CLINIC | Age: 69
End: 2019-11-26

## 2019-12-19 ENCOUNTER — PATIENT OUTREACH (OUTPATIENT)
Dept: ADMINISTRATIVE | Facility: HOSPITAL | Age: 69
End: 2019-12-19

## 2020-01-24 ENCOUNTER — PATIENT OUTREACH (OUTPATIENT)
Dept: ADMINISTRATIVE | Facility: HOSPITAL | Age: 70
End: 2020-01-24

## 2020-02-07 ENCOUNTER — CLINICAL SUPPORT (OUTPATIENT)
Dept: PRIMARY CARE CLINIC | Facility: CLINIC | Age: 70
End: 2020-02-07
Payer: MEDICARE

## 2020-02-07 DIAGNOSIS — E78.5 HYPERLIPIDEMIA, UNSPECIFIED HYPERLIPIDEMIA TYPE: ICD-10-CM

## 2020-02-07 LAB
ALBUMIN SERPL BCP-MCNC: 3.8 G/DL (ref 3.5–5.2)
ALP SERPL-CCNC: 63 U/L (ref 38–126)
ALT SERPL W/O P-5'-P-CCNC: 23 U/L (ref 14–54)
ANION GAP SERPL CALC-SCNC: 9 MMOL/L (ref 8–16)
AST SERPL-CCNC: 25 U/L (ref 15–41)
BASOPHILS # BLD AUTO: 0.1 K/UL (ref 0–0.2)
BASOPHILS NFR BLD: 1.4 % (ref 0–1.9)
BILIRUB SERPL-MCNC: 0.6 MG/DL (ref 0.3–1.2)
BUN SERPL-MCNC: 11 MG/DL (ref 8–23)
CALCIUM SERPL-MCNC: 9.1 MG/DL (ref 8.6–10)
CHLORIDE SERPL-SCNC: 100 MMOL/L (ref 101–111)
CHOLEST SERPL-MCNC: 201 MG/DL (ref 80–200)
CHOLEST/HDLC SERPL: 2.8 {RATIO} (ref 2–5)
CO2 SERPL-SCNC: 28 MMOL/L (ref 23–29)
CREAT SERPL-MCNC: 0.5 MG/DL (ref 0.5–1.4)
DIFFERENTIAL METHOD: ABNORMAL
EOSINOPHIL # BLD AUTO: 0.3 K/UL (ref 0–0.5)
EOSINOPHIL NFR BLD: 6.4 % (ref 0–8)
ERYTHROCYTE [DISTWIDTH] IN BLOOD BY AUTOMATED COUNT: 13.2 % (ref 11.5–14.5)
EST. GFR  (AFRICAN AMERICAN): >60 ML/MIN/1.73 M^2
EST. GFR  (NON AFRICAN AMERICAN): >60 ML/MIN/1.73 M^2
GLUCOSE SERPL-MCNC: 99 MG/DL (ref 74–118)
HCT VFR BLD AUTO: 38.4 % (ref 37–48.5)
HDLC SERPL-MCNC: 73 MG/DL (ref 40–75)
HDLC SERPL: 36.3 % (ref 20–50)
HGB BLD-MCNC: 12.9 G/DL (ref 12–16)
LDLC SERPL CALC-MCNC: 122 MG/DL
LYMPHOCYTES # BLD AUTO: 1.3 K/UL (ref 1–4.8)
LYMPHOCYTES NFR BLD: 26.3 % (ref 18–48)
MCH RBC QN AUTO: 31.5 PG (ref 27–31)
MCHC RBC AUTO-ENTMCNC: 33.5 G/DL (ref 32–36)
MCV RBC AUTO: 94 FL (ref 82–98)
MONOCYTES # BLD AUTO: 0.6 K/UL (ref 0.3–1)
MONOCYTES NFR BLD: 12.3 % (ref 4–15)
NEUTROPHILS # BLD AUTO: 2.7 K/UL (ref 1.8–7.7)
NEUTROPHILS NFR BLD: 53.6 % (ref 38–73)
NONHDLC SERPL-MCNC: 128 MG/DL
PLATELET # BLD AUTO: 307 K/UL (ref 150–350)
PMV BLD AUTO: 7.9 FL (ref 9.2–12.9)
POTASSIUM SERPL-SCNC: 4 MMOL/L (ref 3.5–5.1)
PROT SERPL-MCNC: 6.4 G/DL (ref 6–8.4)
RBC # BLD AUTO: 4.08 M/UL (ref 4–5.4)
SODIUM SERPL-SCNC: 137 MMOL/L (ref 136–145)
TRIGL SERPL-MCNC: 29 MG/DL (ref 30–150)
WBC # BLD AUTO: 5.1 K/UL (ref 3.9–12.7)

## 2020-02-07 PROCEDURE — 80053 COMPREHEN METABOLIC PANEL: CPT

## 2020-02-07 PROCEDURE — 36415 PR COLLECTION VENOUS BLOOD,VENIPUNCTURE: ICD-10-PCS | Mod: S$GLB,,, | Performed by: FAMILY MEDICINE

## 2020-02-07 PROCEDURE — 85025 COMPLETE CBC W/AUTO DIFF WBC: CPT

## 2020-02-07 PROCEDURE — 36415 COLL VENOUS BLD VENIPUNCTURE: CPT | Mod: S$GLB,,, | Performed by: FAMILY MEDICINE

## 2020-02-07 PROCEDURE — 80061 LIPID PANEL: CPT

## 2020-05-05 ENCOUNTER — TELEPHONE (OUTPATIENT)
Dept: PRIMARY CARE CLINIC | Facility: CLINIC | Age: 70
End: 2020-05-05

## 2020-05-05 ENCOUNTER — CLINICAL SUPPORT (OUTPATIENT)
Dept: PRIMARY CARE CLINIC | Facility: CLINIC | Age: 70
End: 2020-05-05
Payer: MEDICARE

## 2020-05-05 ENCOUNTER — TELEPHONE (OUTPATIENT)
Dept: PRIMARY CARE CLINIC | Facility: CLINIC | Age: 70
End: 2020-05-05
Payer: MEDICARE

## 2020-05-05 DIAGNOSIS — R39.9 UTI SYMPTOMS: Primary | ICD-10-CM

## 2020-05-05 LAB
BILIRUB SERPL-MCNC: ABNORMAL MG/DL
BLOOD URINE, POC: 250
COLOR, POC UA: YELLOW
GLUCOSE UR QL STRIP: NORMAL
KETONES UR QL STRIP: ABNORMAL
LEUKOCYTE ESTERASE URINE, POC: ABNORMAL
NITRITE, POC UA: ABNORMAL
PH, POC UA: 7
PROTEIN, POC: ABNORMAL
SPECIFIC GRAVITY, POC UA: 1.01
UROBILINOGEN, POC UA: NORMAL

## 2020-05-05 PROCEDURE — 81002 POCT URINE DIPSTICK WITHOUT MICROSCOPE: ICD-10-PCS | Mod: S$GLB,,, | Performed by: FAMILY MEDICINE

## 2020-05-05 PROCEDURE — 81002 URINALYSIS NONAUTO W/O SCOPE: CPT | Mod: S$GLB,,, | Performed by: FAMILY MEDICINE

## 2020-05-05 NOTE — TELEPHONE ENCOUNTER
----- Message from Michelle Biswas sent at 5/5/2020  9:16 AM CDT -----  Contact: self/[phone]  .1 Patient would like to get medical advice.  Symptoms (please be specific):UTI  How long has patient had these symptoms:05/03/20  Pharmacy name and phone#:[id number] - [place] - [address] [phone] (Phone)  [phone] (Fax)  Any drug allergies: on file   Comments: Patient would like to get medical advice. If is possible to sent something to the pharmacy. Thank you

## 2020-05-05 NOTE — TELEPHONE ENCOUNTER
Lab apt made  Pt states she has not taken any AZOs or anything that will change the color of her urine

## 2020-05-05 NOTE — TELEPHONE ENCOUNTER
----- Message from Michelle Biswas sent at 5/5/2020  9:16 AM CDT -----  Contact: self/[phone]  .1 Patient would like to get medical advice.  Symptoms (please be specific):UTI  How long has patient had these symptoms:05/03/20  Pharmacy name and phone#:[facility] - [id number] - [address] [phone] (Phone)  [phone] (Fax)  Any drug allergies: on file   Comments: Patient would like to get medical advice. If is possible to sent something to the pharmacy. Thank you

## 2020-05-05 NOTE — TELEPHONE ENCOUNTER
----- Message from Sena Mccoy sent at 5/5/2020  3:28 PM CDT -----  Contact: Patient  Type:  Test Results    Who Called:  Alexandria patient  Name of Test (Lab/Mammo/Etc):  Urine  Date of Test:  05/05/2020  Ordering Provider:  Dr Meehan  Where the test was performed:  Office  Best Call Back Number:  [phone]  Additional Information:  Please call her. Thanks.

## 2020-05-07 RX ORDER — CIPROFLOXACIN 250 MG/1
250 TABLET, FILM COATED ORAL 2 TIMES DAILY
Qty: 20 TABLET | Refills: 0 | Status: SHIPPED | OUTPATIENT
Start: 2020-05-07 | End: 2020-05-17

## 2020-07-02 DIAGNOSIS — Z12.39 BREAST CANCER SCREENING: ICD-10-CM

## 2020-09-28 ENCOUNTER — OFFICE VISIT (OUTPATIENT)
Dept: PRIMARY CARE CLINIC | Facility: CLINIC | Age: 70
End: 2020-09-28
Payer: MEDICARE

## 2020-09-28 ENCOUNTER — TELEPHONE (OUTPATIENT)
Dept: PRIMARY CARE CLINIC | Facility: CLINIC | Age: 70
End: 2020-09-28

## 2020-09-28 VITALS
TEMPERATURE: 98 F | RESPIRATION RATE: 17 BRPM | WEIGHT: 127 LBS | DIASTOLIC BLOOD PRESSURE: 80 MMHG | HEART RATE: 66 BPM | SYSTOLIC BLOOD PRESSURE: 116 MMHG | BODY MASS INDEX: 20.41 KG/M2 | HEIGHT: 66 IN | OXYGEN SATURATION: 95 %

## 2020-09-28 DIAGNOSIS — N32.81 OVERACTIVE BLADDER: ICD-10-CM

## 2020-09-28 DIAGNOSIS — M41.45 NEUROMUSCULAR SCOLIOSIS OF THORACOLUMBAR REGION: ICD-10-CM

## 2020-09-28 DIAGNOSIS — K21.00 GASTROESOPHAGEAL REFLUX DISEASE WITH ESOPHAGITIS: ICD-10-CM

## 2020-09-28 DIAGNOSIS — M79.18 MYOFASCIAL PAIN: ICD-10-CM

## 2020-09-28 DIAGNOSIS — Z87.442 HISTORY OF NEPHROLITHIASIS: ICD-10-CM

## 2020-09-28 DIAGNOSIS — M41.9 SCOLIOSIS, UNSPECIFIED SCOLIOSIS TYPE, UNSPECIFIED SPINAL REGION: Primary | Chronic | ICD-10-CM

## 2020-09-28 DIAGNOSIS — I10 HTN (HYPERTENSION), BENIGN: ICD-10-CM

## 2020-09-28 PROCEDURE — 99214 PR OFFICE/OUTPT VISIT, EST, LEVL IV, 30-39 MIN: ICD-10-PCS | Mod: 25,S$GLB,, | Performed by: FAMILY MEDICINE

## 2020-09-28 PROCEDURE — 90471 IMMUNIZATION ADMIN: CPT | Mod: S$GLB,,, | Performed by: FAMILY MEDICINE

## 2020-09-28 PROCEDURE — G0009 ADMIN PNEUMOCOCCAL VACCINE: HCPCS | Mod: 59,S$GLB,, | Performed by: FAMILY MEDICINE

## 2020-09-28 PROCEDURE — 99999 PR PBB SHADOW E&M-EST. PATIENT-LVL III: CPT | Mod: PBBFAC,,, | Performed by: FAMILY MEDICINE

## 2020-09-28 PROCEDURE — 90714 TD VACC NO PRESV 7 YRS+ IM: CPT | Mod: S$GLB,,, | Performed by: FAMILY MEDICINE

## 2020-09-28 PROCEDURE — 3074F SYST BP LT 130 MM HG: CPT | Mod: S$GLB,,, | Performed by: FAMILY MEDICINE

## 2020-09-28 PROCEDURE — 90670 PNEUMOCOCCAL CONJUGATE VACCINE 13-VALENT LESS THAN 5YO & GREATER THAN: ICD-10-PCS | Mod: S$GLB,,, | Performed by: FAMILY MEDICINE

## 2020-09-28 PROCEDURE — 90714 TD VACCINE GREATER THAN OR EQUAL TO 7YO PRESERVATIVE FREE IM: ICD-10-PCS | Mod: S$GLB,,, | Performed by: FAMILY MEDICINE

## 2020-09-28 PROCEDURE — 3008F BODY MASS INDEX DOCD: CPT | Mod: S$GLB,,, | Performed by: FAMILY MEDICINE

## 2020-09-28 PROCEDURE — 1101F PT FALLS ASSESS-DOCD LE1/YR: CPT | Mod: S$GLB,,, | Performed by: FAMILY MEDICINE

## 2020-09-28 PROCEDURE — G0008 FLU VACCINE - QUADRIVALENT - ADJUVANTED: ICD-10-PCS | Mod: 59,S$GLB,, | Performed by: FAMILY MEDICINE

## 2020-09-28 PROCEDURE — 3079F PR MOST RECENT DIASTOLIC BLOOD PRESSURE 80-89 MM HG: ICD-10-PCS | Mod: S$GLB,,, | Performed by: FAMILY MEDICINE

## 2020-09-28 PROCEDURE — G0009 TD VACCINE GREATER THAN OR EQUAL TO 7YO PRESERVATIVE FREE IM: ICD-10-PCS | Mod: 59,S$GLB,, | Performed by: FAMILY MEDICINE

## 2020-09-28 PROCEDURE — 1101F PR PT FALLS ASSESS DOC 0-1 FALLS W/OUT INJ PAST YR: ICD-10-PCS | Mod: S$GLB,,, | Performed by: FAMILY MEDICINE

## 2020-09-28 PROCEDURE — 90694 FLU VACCINE - QUADRIVALENT - ADJUVANTED: ICD-10-PCS | Mod: S$GLB,,, | Performed by: FAMILY MEDICINE

## 2020-09-28 PROCEDURE — 99999 PR PBB SHADOW E&M-EST. PATIENT-LVL III: ICD-10-PCS | Mod: PBBFAC,,, | Performed by: FAMILY MEDICINE

## 2020-09-28 PROCEDURE — 3079F DIAST BP 80-89 MM HG: CPT | Mod: S$GLB,,, | Performed by: FAMILY MEDICINE

## 2020-09-28 PROCEDURE — 1159F PR MEDICATION LIST DOCUMENTED IN MEDICAL RECORD: ICD-10-PCS | Mod: S$GLB,,, | Performed by: FAMILY MEDICINE

## 2020-09-28 PROCEDURE — 99214 OFFICE O/P EST MOD 30 MIN: CPT | Mod: 25,S$GLB,, | Performed by: FAMILY MEDICINE

## 2020-09-28 PROCEDURE — 1126F PR PAIN SEVERITY QUANTIFIED, NO PAIN PRESENT: ICD-10-PCS | Mod: S$GLB,,, | Performed by: FAMILY MEDICINE

## 2020-09-28 PROCEDURE — 90670 PCV13 VACCINE IM: CPT | Mod: S$GLB,,, | Performed by: FAMILY MEDICINE

## 2020-09-28 PROCEDURE — 3008F PR BODY MASS INDEX (BMI) DOCUMENTED: ICD-10-PCS | Mod: S$GLB,,, | Performed by: FAMILY MEDICINE

## 2020-09-28 PROCEDURE — 3074F PR MOST RECENT SYSTOLIC BLOOD PRESSURE < 130 MM HG: ICD-10-PCS | Mod: S$GLB,,, | Performed by: FAMILY MEDICINE

## 2020-09-28 PROCEDURE — 1159F MED LIST DOCD IN RCRD: CPT | Mod: S$GLB,,, | Performed by: FAMILY MEDICINE

## 2020-09-28 PROCEDURE — 90471 PNEUMOCOCCAL CONJUGATE VACCINE 13-VALENT LESS THAN 5YO & GREATER THAN: ICD-10-PCS | Mod: S$GLB,,, | Performed by: FAMILY MEDICINE

## 2020-09-28 PROCEDURE — 1126F AMNT PAIN NOTED NONE PRSNT: CPT | Mod: S$GLB,,, | Performed by: FAMILY MEDICINE

## 2020-09-28 PROCEDURE — 90694 VACC AIIV4 NO PRSRV 0.5ML IM: CPT | Mod: S$GLB,,, | Performed by: FAMILY MEDICINE

## 2020-09-28 PROCEDURE — G0008 ADMIN INFLUENZA VIRUS VAC: HCPCS | Mod: 59,S$GLB,, | Performed by: FAMILY MEDICINE

## 2020-09-28 RX ORDER — DICLOFENAC SODIUM 10 MG/G
2 GEL TOPICAL 4 TIMES DAILY PRN
Qty: 100 G | Refills: 5 | Status: SHIPPED | OUTPATIENT
Start: 2020-09-28 | End: 2021-01-15

## 2020-09-28 RX ORDER — FLUTICASONE PROPIONATE 50 MCG
1 SPRAY, SUSPENSION (ML) NASAL DAILY
Qty: 16 G | Refills: 5 | Status: SHIPPED | OUTPATIENT
Start: 2020-09-28 | End: 2021-01-15 | Stop reason: SDUPTHER

## 2020-09-28 RX ORDER — LISINOPRIL AND HYDROCHLOROTHIAZIDE 12.5; 2 MG/1; MG/1
1 TABLET ORAL DAILY
Qty: 90 TABLET | Refills: 1 | Status: SHIPPED | OUTPATIENT
Start: 2020-09-28 | End: 2021-01-15 | Stop reason: SDUPTHER

## 2020-09-28 RX ORDER — OMEPRAZOLE 40 MG/1
40 CAPSULE, DELAYED RELEASE ORAL DAILY
Qty: 90 CAPSULE | Refills: 1 | Status: SHIPPED | OUTPATIENT
Start: 2020-09-28 | End: 2021-01-15 | Stop reason: SDUPTHER

## 2020-09-28 RX ORDER — OXYBUTYNIN CHLORIDE 10 MG/1
10 TABLET, EXTENDED RELEASE ORAL DAILY
Qty: 90 TABLET | Refills: 3 | Status: SHIPPED | OUTPATIENT
Start: 2020-09-28 | End: 2021-01-15 | Stop reason: SDUPTHER

## 2020-09-28 NOTE — PROGRESS NOTES
Subjective:       Patient ID: Alexandria Estrada is a 70 y.o. female.    Chief Complaint: Annual Exam    HPI: 69 yo WF --in for annual physical needs flu shot--eating well--+BM--ambulating fairly well not as well as before the pandemic      ROS:  Skin: no psoriasis, eczema, skin cancer skin lesion +history skin cancer  HEENT: ,no ocular pain, blurred vision, diplopia, epistaxis, hoarseness no change in voice thyroid trouble  Floaters also Flonase due to postnasal drip  Lung: No pneumonia, asthma, Tb, wheezing, SOB, no smoking  Heart: No chest pain, ankle edema, palpitations, MI, elaine murmur, +hypertension blood pressure ,no hyperlipidemia  Abdomen: No nausea, vomiting,  diarrhea, constipation, ulcers, hepatitis, gallbladder disease, melena, hematochezia, hematemesis  :  Has overactive bladder + hx UTI OK now    history renal stone 20 years ago-on oxybutynin for bladder spasm  MS: no fractures, + O/A--needs hands severe scoliosis lumbar spine, right heel --nocturnal leg cramps  Neuro: No dizziness, LOC, seizures   No diabetes, no anemia, no anxiety, no depression   , 2 daughters, work retired ,  lives with  father--95 yo father with heart disease bladder cancer lives with patient--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    Objective:   Physical Exam:  General: Well nourished, well developed, no acute distress +thin  Skin: No lesions  HEENT: Eyes PERRLA, EOM intact, nose clear discharge, throat none rythematous ears clear fluid  NECK: Supple, no bruits, No JVD, no nodes  Lungs: Clear, no rales, rhonchi, wheezing   Heart: Regular rate and rhythm, no murmurs, gallops, or rubs  Abdomen: flat, bowel sounds positive, no tenderness, or organomegaly some pressure in suprapubic area and groin area  MS: Severe scoliosis of the lumbar spine and thoracic spine, pain right heel mainly at the base of the heel and Achilles area more with weight-bearing then with palpation or flexion extension or inversion or  eversion of the foot more with weight-bearing  Neuro: Alert, CN intact, oriented X 3  Extremities: No cyanosis, clubbing, or edema         Assessment:       1. Scoliosis, unspecified scoliosis type, unspecified spinal region    2. Neuromuscular scoliosis of thoracolumbar region    3. Myofascial pain    4. Gastroesophageal reflux disease with esophagitis    5. Overactive bladder    6. History of nephrolithiasis    7. HTN (hypertension), benign        Plan:       Scoliosis, unspecified scoliosis type, unspecified spinal region    Neuromuscular scoliosis of thoracolumbar region  -     diclofenac sodium (VOLTAREN) 1 % Gel; Apply 2 g topically 4 (four) times daily as needed.  Dispense: 100 g; Refill: 5    Myofascial pain  -     diclofenac sodium (VOLTAREN) 1 % Gel; Apply 2 g topically 4 (four) times daily as needed.  Dispense: 100 g; Refill: 5    Gastroesophageal reflux disease with esophagitis    Overactive bladder    History of nephrolithiasis    HTN (hypertension), benign  -     CBC auto differential; Future; Expected date: 09/28/2020  -     Comprehensive metabolic panel; Future; Expected date: 09/28/2020  -     Lipid Panel; Future; Expected date: 09/28/2020  -     POCT URINE DIPSTICK WITH MICROSCOPE, AUTOMATED  -     Fecal Immunochemical Test (iFOBT); Future; Expected date: 09/28/2020  -     CBC auto differential; Future; Expected date: 09/28/2020  -     Comprehensive metabolic panel; Future; Expected date: 09/28/2020  -     Lipid Panel; Future; Expected date: 09/28/2020  -     POCT urine dipstick without microscope  -     Fecal Immunochemical Test (iFOBT); Future; Expected date: 09/28/2020  -     T4, free; Future; Expected date: 09/28/2020  -     TSH; Future; Expected date: 09/28/2020    Other orders  -     fluticasone propionate (FLONASE) 50 mcg/actuation nasal spray; 1 spray (50 mcg total) by Each Nostril route once daily.  Dispense: 16 g; Refill: 5  -     lisinopriL-hydrochlorothiazide (PRINZIDE,ZESTORETIC)  20-12.5 mg per tablet; Take 1 tablet by mouth once daily.  Dispense: 90 tablet; Refill: 1  -     omeprazole (PRILOSEC) 40 MG capsule; Take 1 capsule (40 mg total) by mouth once daily.  Dispense: 90 capsule; Refill: 1  -     oxybutynin (DITROPAN-XL) 10 MG 24 hr tablet; Take 1 tablet (10 mg total) by mouth once daily.  Dispense: 90 tablet; Refill: 3  -     Influenza - Quadrivalent (Adjuvanted)  -     (In Office Administered) Td Vaccine - Preservative Free  -     (In Office Administered) Pneumococcal Conjugate Vaccine (13 Valent) (IM)      in for annual physical--needs insurance form filled out  Hypertension blood pressure 116/80 doing well on Zestoretic  History overactive bladder doing well on Ditropan  History of GERD doing okay on omeprazole  History scoliosis myofascial pain--states overall doing well does not need any medication  Needs yearly physical--CBCs CMP lipids T4 TSH UA stool guaiac had chest x-ray EKG March 2019  Health maintenance tetanus pneumococcal vaccine flu  Patient also asking about possible epidural steroid injection to the left lower back has severe scoliosis did have an MRI the back about a year ago will get copy of the MRI and sent patient to pain clinic for evaluation for pop purple epidural steroid injection

## 2020-09-28 NOTE — PROGRESS NOTES
Verified pt ID using name and . NKDA. Administered Td 0.5mL in left delotoid per physician order using aseptic technique. Aspirated and no blood return noted. Pt tolerated well with no adverse reactions noted.     Verified pt ID using name and .  NKDA. Administered Influenza (FLUAD) - Quadrivalent - Adjuvanted - PF *Preferred* (65+) 0.5mL in left deltoid per physician order using aseptic technique. Aspirated and no blood return noted. Pt tolerated well with no adverse reactions noted.       Verified pt ID using name and . NKDA. Administered Pneumococcal Conjugate - 13 Valent 0.5mL in right deltoid per physician order using aseptic technique. Aspirated and no blood return noted. Pt tolerated well with no adverse reactions noted.

## 2020-10-01 ENCOUNTER — TELEPHONE (OUTPATIENT)
Dept: PRIMARY CARE CLINIC | Facility: CLINIC | Age: 70
End: 2020-10-01

## 2020-10-01 NOTE — TELEPHONE ENCOUNTER
----- Message from Marge Brandt sent at 10/1/2020  2:41 PM CDT -----  Regarding: Return call  Contact: Patient @ [phone]  Patient is returning a phone call.  Who left a message for the patient: Lillie  Does patient know what this is regarding:    Comments:

## 2021-01-15 ENCOUNTER — TELEPHONE (OUTPATIENT)
Dept: PRIMARY CARE CLINIC | Facility: CLINIC | Age: 71
End: 2021-01-15

## 2021-01-15 ENCOUNTER — OFFICE VISIT (OUTPATIENT)
Dept: PRIMARY CARE CLINIC | Facility: CLINIC | Age: 71
End: 2021-01-15
Payer: MEDICARE

## 2021-01-15 VITALS
WEIGHT: 133.25 LBS | HEIGHT: 66 IN | BODY MASS INDEX: 21.41 KG/M2 | HEART RATE: 77 BPM | DIASTOLIC BLOOD PRESSURE: 82 MMHG | SYSTOLIC BLOOD PRESSURE: 122 MMHG | RESPIRATION RATE: 17 BRPM | OXYGEN SATURATION: 94 %

## 2021-01-15 DIAGNOSIS — M41.9 SCOLIOSIS, UNSPECIFIED SCOLIOSIS TYPE, UNSPECIFIED SPINAL REGION: ICD-10-CM

## 2021-01-15 DIAGNOSIS — N32.81 OVERACTIVE BLADDER: ICD-10-CM

## 2021-01-15 DIAGNOSIS — Z78.0 ENCOUNTER FOR OSTEOPOROSIS SCREENING IN ASYMPTOMATIC POSTMENOPAUSAL PATIENT: Primary | ICD-10-CM

## 2021-01-15 DIAGNOSIS — I10 HTN (HYPERTENSION), BENIGN: ICD-10-CM

## 2021-01-15 DIAGNOSIS — Z13.820 ENCOUNTER FOR OSTEOPOROSIS SCREENING IN ASYMPTOMATIC POSTMENOPAUSAL PATIENT: Primary | ICD-10-CM

## 2021-01-15 DIAGNOSIS — M41.45 NEUROMUSCULAR SCOLIOSIS OF THORACOLUMBAR REGION: ICD-10-CM

## 2021-01-15 DIAGNOSIS — Z12.31 VISIT FOR SCREENING MAMMOGRAM: ICD-10-CM

## 2021-01-15 DIAGNOSIS — R42 DIZZINESS: ICD-10-CM

## 2021-01-15 DIAGNOSIS — R09.89 OTHER SPECIFIED SYMPTOMS AND SIGNS INVOLVING THE CIRCULATORY AND RESPIRATORY SYSTEMS: ICD-10-CM

## 2021-01-15 DIAGNOSIS — K21.00 GASTROESOPHAGEAL REFLUX DISEASE WITH ESOPHAGITIS WITHOUT HEMORRHAGE: ICD-10-CM

## 2021-01-15 DIAGNOSIS — I73.9 PERIPHERAL VASCULAR DISEASE: Primary | ICD-10-CM

## 2021-01-15 DIAGNOSIS — Z87.442 HISTORY OF NEPHROLITHIASIS: ICD-10-CM

## 2021-01-15 PROBLEM — N39.0 URINARY TRACT INFECTION WITHOUT HEMATURIA: Status: RESOLVED | Noted: 2018-04-24 | Resolved: 2021-01-15

## 2021-01-15 PROBLEM — Z87.440 HISTORY OF UTI: Status: RESOLVED | Noted: 2019-08-05 | Resolved: 2021-01-15

## 2021-01-15 PROCEDURE — 99397 PER PM REEVAL EST PAT 65+ YR: CPT | Mod: S$GLB,,, | Performed by: FAMILY MEDICINE

## 2021-01-15 PROCEDURE — 99999 PR PBB SHADOW E&M-EST. PATIENT-LVL IV: ICD-10-PCS | Mod: PBBFAC,,, | Performed by: FAMILY MEDICINE

## 2021-01-15 PROCEDURE — 1101F PR PT FALLS ASSESS DOC 0-1 FALLS W/OUT INJ PAST YR: ICD-10-PCS | Mod: S$GLB,,, | Performed by: FAMILY MEDICINE

## 2021-01-15 PROCEDURE — 99999 PR PBB SHADOW E&M-EST. PATIENT-LVL IV: CPT | Mod: PBBFAC,,, | Performed by: FAMILY MEDICINE

## 2021-01-15 PROCEDURE — 3074F PR MOST RECENT SYSTOLIC BLOOD PRESSURE < 130 MM HG: ICD-10-PCS | Mod: S$GLB,,, | Performed by: FAMILY MEDICINE

## 2021-01-15 PROCEDURE — 99397 PR PREVENTIVE VISIT,EST,65 & OVER: ICD-10-PCS | Mod: S$GLB,,, | Performed by: FAMILY MEDICINE

## 2021-01-15 PROCEDURE — 3079F PR MOST RECENT DIASTOLIC BLOOD PRESSURE 80-89 MM HG: ICD-10-PCS | Mod: S$GLB,,, | Performed by: FAMILY MEDICINE

## 2021-01-15 PROCEDURE — 3079F DIAST BP 80-89 MM HG: CPT | Mod: S$GLB,,, | Performed by: FAMILY MEDICINE

## 2021-01-15 PROCEDURE — 3008F BODY MASS INDEX DOCD: CPT | Mod: S$GLB,,, | Performed by: FAMILY MEDICINE

## 2021-01-15 PROCEDURE — 1101F PT FALLS ASSESS-DOCD LE1/YR: CPT | Mod: S$GLB,,, | Performed by: FAMILY MEDICINE

## 2021-01-15 PROCEDURE — 3008F PR BODY MASS INDEX (BMI) DOCUMENTED: ICD-10-PCS | Mod: S$GLB,,, | Performed by: FAMILY MEDICINE

## 2021-01-15 PROCEDURE — 3288F PR FALLS RISK ASSESSMENT DOCUMENTED: ICD-10-PCS | Mod: S$GLB,,, | Performed by: FAMILY MEDICINE

## 2021-01-15 PROCEDURE — 3074F SYST BP LT 130 MM HG: CPT | Mod: S$GLB,,, | Performed by: FAMILY MEDICINE

## 2021-01-15 PROCEDURE — 3288F FALL RISK ASSESSMENT DOCD: CPT | Mod: S$GLB,,, | Performed by: FAMILY MEDICINE

## 2021-01-15 RX ORDER — OXYBUTYNIN CHLORIDE 10 MG/1
10 TABLET, EXTENDED RELEASE ORAL DAILY
Qty: 90 TABLET | Refills: 3 | Status: SHIPPED | OUTPATIENT
Start: 2021-01-15 | End: 2022-03-07 | Stop reason: SDUPTHER

## 2021-01-15 RX ORDER — LISINOPRIL AND HYDROCHLOROTHIAZIDE 12.5; 2 MG/1; MG/1
1 TABLET ORAL DAILY
Qty: 90 TABLET | Refills: 1 | Status: SHIPPED | OUTPATIENT
Start: 2021-01-15 | End: 2022-03-07

## 2021-01-15 RX ORDER — FLUTICASONE PROPIONATE 50 MCG
1 SPRAY, SUSPENSION (ML) NASAL DAILY
Qty: 16 G | Refills: 5 | Status: SHIPPED | OUTPATIENT
Start: 2021-01-15 | End: 2022-03-07 | Stop reason: SDUPTHER

## 2021-01-15 RX ORDER — OMEPRAZOLE 40 MG/1
40 CAPSULE, DELAYED RELEASE ORAL DAILY
Qty: 90 CAPSULE | Refills: 1 | Status: SHIPPED | OUTPATIENT
Start: 2021-01-15 | End: 2022-03-07 | Stop reason: SDUPTHER

## 2021-01-28 ENCOUNTER — HOSPITAL ENCOUNTER (OUTPATIENT)
Dept: RADIOLOGY | Facility: HOSPITAL | Age: 71
Discharge: HOME | End: 2021-01-28
Attending: FAMILY MEDICINE
Payer: MEDICARE

## 2021-01-28 DIAGNOSIS — Z12.31 VISIT FOR SCREENING MAMMOGRAM: ICD-10-CM

## 2021-01-28 PROCEDURE — 77067 SCR MAMMO BI INCL CAD: CPT | Mod: TC,PO

## 2021-02-10 ENCOUNTER — TELEPHONE (OUTPATIENT)
Dept: PRIMARY CARE CLINIC | Facility: CLINIC | Age: 71
End: 2021-02-10

## 2021-03-23 ENCOUNTER — OFFICE VISIT (OUTPATIENT)
Dept: PRIMARY CARE CLINIC | Facility: CLINIC | Age: 71
End: 2021-03-23
Payer: MEDICARE

## 2021-03-23 VITALS
WEIGHT: 130.06 LBS | BODY MASS INDEX: 20.9 KG/M2 | RESPIRATION RATE: 18 BRPM | HEIGHT: 66 IN | OXYGEN SATURATION: 98 % | HEART RATE: 70 BPM | TEMPERATURE: 98 F | DIASTOLIC BLOOD PRESSURE: 78 MMHG | SYSTOLIC BLOOD PRESSURE: 124 MMHG

## 2021-03-23 DIAGNOSIS — G47.62 NOCTURNAL LEG CRAMPS: ICD-10-CM

## 2021-03-23 DIAGNOSIS — K21.00 GASTROESOPHAGEAL REFLUX DISEASE WITH ESOPHAGITIS WITHOUT HEMORRHAGE: ICD-10-CM

## 2021-03-23 DIAGNOSIS — M41.9 SCOLIOSIS, UNSPECIFIED SCOLIOSIS TYPE, UNSPECIFIED SPINAL REGION: Chronic | ICD-10-CM

## 2021-03-23 DIAGNOSIS — Z87.442 HISTORY OF NEPHROLITHIASIS: ICD-10-CM

## 2021-03-23 DIAGNOSIS — I10 HTN (HYPERTENSION), BENIGN: Primary | ICD-10-CM

## 2021-03-23 DIAGNOSIS — M54.30 SCIATICA, UNSPECIFIED LATERALITY: ICD-10-CM

## 2021-03-23 DIAGNOSIS — N32.81 OVERACTIVE BLADDER: ICD-10-CM

## 2021-03-23 LAB
BILIRUB SERPL-MCNC: NORMAL MG/DL
BLOOD URINE, POC: NORMAL
CLARITY, POC UA: CLEAR
COLOR, POC UA: YELLOW
GLUCOSE UR QL STRIP: NORMAL
KETONES UR QL STRIP: NORMAL
LEUKOCYTE ESTERASE URINE, POC: NORMAL
NITRITE, POC UA: NORMAL
PH, POC UA: 8
PROTEIN, POC: NORMAL
SPECIFIC GRAVITY, POC UA: 1
UROBILINOGEN, POC UA: NORMAL

## 2021-03-23 PROCEDURE — 99214 OFFICE O/P EST MOD 30 MIN: CPT | Mod: 25,S$GLB,, | Performed by: FAMILY MEDICINE

## 2021-03-23 PROCEDURE — 3288F FALL RISK ASSESSMENT DOCD: CPT | Mod: S$GLB,,, | Performed by: FAMILY MEDICINE

## 2021-03-23 PROCEDURE — 3008F BODY MASS INDEX DOCD: CPT | Mod: S$GLB,,, | Performed by: FAMILY MEDICINE

## 2021-03-23 PROCEDURE — 99214 PR OFFICE/OUTPT VISIT, EST, LEVL IV, 30-39 MIN: ICD-10-PCS | Mod: 25,S$GLB,, | Performed by: FAMILY MEDICINE

## 2021-03-23 PROCEDURE — 99999 PR PBB SHADOW E&M-EST. PATIENT-LVL III: ICD-10-PCS | Mod: PBBFAC,,, | Performed by: FAMILY MEDICINE

## 2021-03-23 PROCEDURE — 81002 URINALYSIS NONAUTO W/O SCOPE: CPT | Mod: S$GLB,,, | Performed by: FAMILY MEDICINE

## 2021-03-23 PROCEDURE — 1101F PT FALLS ASSESS-DOCD LE1/YR: CPT | Mod: S$GLB,,, | Performed by: FAMILY MEDICINE

## 2021-03-23 PROCEDURE — 3078F DIAST BP <80 MM HG: CPT | Mod: S$GLB,,, | Performed by: FAMILY MEDICINE

## 2021-03-23 PROCEDURE — 3078F PR MOST RECENT DIASTOLIC BLOOD PRESSURE < 80 MM HG: ICD-10-PCS | Mod: S$GLB,,, | Performed by: FAMILY MEDICINE

## 2021-03-23 PROCEDURE — 3008F PR BODY MASS INDEX (BMI) DOCUMENTED: ICD-10-PCS | Mod: S$GLB,,, | Performed by: FAMILY MEDICINE

## 2021-03-23 PROCEDURE — 3074F SYST BP LT 130 MM HG: CPT | Mod: S$GLB,,, | Performed by: FAMILY MEDICINE

## 2021-03-23 PROCEDURE — 1126F AMNT PAIN NOTED NONE PRSNT: CPT | Mod: S$GLB,,, | Performed by: FAMILY MEDICINE

## 2021-03-23 PROCEDURE — 1101F PR PT FALLS ASSESS DOC 0-1 FALLS W/OUT INJ PAST YR: ICD-10-PCS | Mod: S$GLB,,, | Performed by: FAMILY MEDICINE

## 2021-03-23 PROCEDURE — 81002 POCT URINE DIPSTICK WITHOUT MICROSCOPE: ICD-10-PCS | Mod: S$GLB,,, | Performed by: FAMILY MEDICINE

## 2021-03-23 PROCEDURE — 3074F PR MOST RECENT SYSTOLIC BLOOD PRESSURE < 130 MM HG: ICD-10-PCS | Mod: S$GLB,,, | Performed by: FAMILY MEDICINE

## 2021-03-23 PROCEDURE — 1126F PR PAIN SEVERITY QUANTIFIED, NO PAIN PRESENT: ICD-10-PCS | Mod: S$GLB,,, | Performed by: FAMILY MEDICINE

## 2021-03-23 PROCEDURE — 99999 PR PBB SHADOW E&M-EST. PATIENT-LVL III: CPT | Mod: PBBFAC,,, | Performed by: FAMILY MEDICINE

## 2021-03-23 PROCEDURE — 3288F PR FALLS RISK ASSESSMENT DOCUMENTED: ICD-10-PCS | Mod: S$GLB,,, | Performed by: FAMILY MEDICINE

## 2021-03-24 ENCOUNTER — TELEPHONE (OUTPATIENT)
Dept: PRIMARY CARE CLINIC | Facility: CLINIC | Age: 71
End: 2021-03-24

## 2021-03-24 PROBLEM — R73.03 BORDERLINE DIABETES: Status: ACTIVE | Noted: 2021-03-24

## 2021-08-25 ENCOUNTER — OFFICE VISIT (OUTPATIENT)
Dept: PRIMARY CARE CLINIC | Facility: CLINIC | Age: 71
End: 2021-08-25
Payer: MEDICARE

## 2021-08-25 VITALS
OXYGEN SATURATION: 96 % | RESPIRATION RATE: 18 BRPM | TEMPERATURE: 98 F | WEIGHT: 128.19 LBS | HEART RATE: 71 BPM | DIASTOLIC BLOOD PRESSURE: 96 MMHG | BODY MASS INDEX: 20.6 KG/M2 | SYSTOLIC BLOOD PRESSURE: 142 MMHG | HEIGHT: 66 IN

## 2021-08-25 DIAGNOSIS — R73.03 BORDERLINE DIABETES: ICD-10-CM

## 2021-08-25 DIAGNOSIS — M54.30 SCIATICA, UNSPECIFIED LATERALITY: ICD-10-CM

## 2021-08-25 DIAGNOSIS — Z87.442 HISTORY OF NEPHROLITHIASIS: ICD-10-CM

## 2021-08-25 DIAGNOSIS — N32.81 OVERACTIVE BLADDER: ICD-10-CM

## 2021-08-25 DIAGNOSIS — K21.00 GASTROESOPHAGEAL REFLUX DISEASE WITH ESOPHAGITIS WITHOUT HEMORRHAGE: ICD-10-CM

## 2021-08-25 DIAGNOSIS — I10 HTN (HYPERTENSION), BENIGN: ICD-10-CM

## 2021-08-25 DIAGNOSIS — M94.9 DISORDER OF CARTILAGE, UNSPECIFIED: ICD-10-CM

## 2021-08-25 DIAGNOSIS — I10 HYPERTENSION, UNSPECIFIED TYPE: Primary | ICD-10-CM

## 2021-08-25 DIAGNOSIS — M41.9 SCOLIOSIS, UNSPECIFIED SCOLIOSIS TYPE, UNSPECIFIED SPINAL REGION: Chronic | ICD-10-CM

## 2021-08-25 PROCEDURE — 3077F SYST BP >= 140 MM HG: CPT | Mod: S$GLB,,, | Performed by: FAMILY MEDICINE

## 2021-08-25 PROCEDURE — 3008F BODY MASS INDEX DOCD: CPT | Mod: S$GLB,,, | Performed by: FAMILY MEDICINE

## 2021-08-25 PROCEDURE — 1101F PT FALLS ASSESS-DOCD LE1/YR: CPT | Mod: S$GLB,,, | Performed by: FAMILY MEDICINE

## 2021-08-25 PROCEDURE — 1159F MED LIST DOCD IN RCRD: CPT | Mod: S$GLB,,, | Performed by: FAMILY MEDICINE

## 2021-08-25 PROCEDURE — 1126F AMNT PAIN NOTED NONE PRSNT: CPT | Mod: S$GLB,,, | Performed by: FAMILY MEDICINE

## 2021-08-25 PROCEDURE — 99214 PR OFFICE/OUTPT VISIT, EST, LEVL IV, 30-39 MIN: ICD-10-PCS | Mod: S$GLB,,, | Performed by: FAMILY MEDICINE

## 2021-08-25 PROCEDURE — 1159F PR MEDICATION LIST DOCUMENTED IN MEDICAL RECORD: ICD-10-PCS | Mod: S$GLB,,, | Performed by: FAMILY MEDICINE

## 2021-08-25 PROCEDURE — 99999 PR PBB SHADOW E&M-EST. PATIENT-LVL III: ICD-10-PCS | Mod: PBBFAC,,, | Performed by: FAMILY MEDICINE

## 2021-08-25 PROCEDURE — 3288F FALL RISK ASSESSMENT DOCD: CPT | Mod: S$GLB,,, | Performed by: FAMILY MEDICINE

## 2021-08-25 PROCEDURE — 3077F PR MOST RECENT SYSTOLIC BLOOD PRESSURE >= 140 MM HG: ICD-10-PCS | Mod: S$GLB,,, | Performed by: FAMILY MEDICINE

## 2021-08-25 PROCEDURE — 99214 OFFICE O/P EST MOD 30 MIN: CPT | Mod: S$GLB,,, | Performed by: FAMILY MEDICINE

## 2021-08-25 PROCEDURE — 3080F DIAST BP >= 90 MM HG: CPT | Mod: S$GLB,,, | Performed by: FAMILY MEDICINE

## 2021-08-25 PROCEDURE — 1101F PR PT FALLS ASSESS DOC 0-1 FALLS W/OUT INJ PAST YR: ICD-10-PCS | Mod: S$GLB,,, | Performed by: FAMILY MEDICINE

## 2021-08-25 PROCEDURE — 3008F PR BODY MASS INDEX (BMI) DOCUMENTED: ICD-10-PCS | Mod: S$GLB,,, | Performed by: FAMILY MEDICINE

## 2021-08-25 PROCEDURE — 99999 PR PBB SHADOW E&M-EST. PATIENT-LVL III: CPT | Mod: PBBFAC,,, | Performed by: FAMILY MEDICINE

## 2021-08-25 PROCEDURE — 1126F PR PAIN SEVERITY QUANTIFIED, NO PAIN PRESENT: ICD-10-PCS | Mod: S$GLB,,, | Performed by: FAMILY MEDICINE

## 2021-08-25 PROCEDURE — 3288F PR FALLS RISK ASSESSMENT DOCUMENTED: ICD-10-PCS | Mod: S$GLB,,, | Performed by: FAMILY MEDICINE

## 2021-08-25 PROCEDURE — 3080F PR MOST RECENT DIASTOLIC BLOOD PRESSURE >= 90 MM HG: ICD-10-PCS | Mod: S$GLB,,, | Performed by: FAMILY MEDICINE

## 2021-08-25 RX ORDER — AMLODIPINE BESYLATE 2.5 MG/1
2.5 TABLET ORAL DAILY
Qty: 30 TABLET | Refills: 5 | Status: SHIPPED | OUTPATIENT
Start: 2021-08-25 | End: 2021-09-27 | Stop reason: SDUPTHER

## 2021-09-15 ENCOUNTER — PATIENT OUTREACH (OUTPATIENT)
Dept: ADMINISTRATIVE | Facility: HOSPITAL | Age: 71
End: 2021-09-15

## 2021-09-27 ENCOUNTER — OFFICE VISIT (OUTPATIENT)
Dept: PRIMARY CARE CLINIC | Facility: CLINIC | Age: 71
End: 2021-09-27
Payer: MEDICARE

## 2021-09-27 VITALS
OXYGEN SATURATION: 94 % | WEIGHT: 125.56 LBS | DIASTOLIC BLOOD PRESSURE: 76 MMHG | HEART RATE: 83 BPM | RESPIRATION RATE: 18 BRPM | BODY MASS INDEX: 20.18 KG/M2 | HEIGHT: 66 IN | SYSTOLIC BLOOD PRESSURE: 138 MMHG

## 2021-09-27 DIAGNOSIS — M54.30 SCIATICA, UNSPECIFIED LATERALITY: ICD-10-CM

## 2021-09-27 DIAGNOSIS — N32.81 OVERACTIVE BLADDER: ICD-10-CM

## 2021-09-27 DIAGNOSIS — Z87.442 HISTORY OF NEPHROLITHIASIS: ICD-10-CM

## 2021-09-27 DIAGNOSIS — K21.00 GASTROESOPHAGEAL REFLUX DISEASE WITH ESOPHAGITIS WITHOUT HEMORRHAGE: ICD-10-CM

## 2021-09-27 DIAGNOSIS — I10 HTN (HYPERTENSION), BENIGN: Primary | ICD-10-CM

## 2021-09-27 DIAGNOSIS — M41.9 SCOLIOSIS, UNSPECIFIED SCOLIOSIS TYPE, UNSPECIFIED SPINAL REGION: Chronic | ICD-10-CM

## 2021-09-27 DIAGNOSIS — R73.03 BORDERLINE DIABETES: ICD-10-CM

## 2021-09-27 PROCEDURE — 3075F PR MOST RECENT SYSTOLIC BLOOD PRESS GE 130-139MM HG: ICD-10-PCS | Mod: S$GLB,,, | Performed by: FAMILY MEDICINE

## 2021-09-27 PROCEDURE — 3008F BODY MASS INDEX DOCD: CPT | Mod: S$GLB,,, | Performed by: FAMILY MEDICINE

## 2021-09-27 PROCEDURE — 1126F PR PAIN SEVERITY QUANTIFIED, NO PAIN PRESENT: ICD-10-PCS | Mod: S$GLB,,, | Performed by: FAMILY MEDICINE

## 2021-09-27 PROCEDURE — 1101F PR PT FALLS ASSESS DOC 0-1 FALLS W/OUT INJ PAST YR: ICD-10-PCS | Mod: S$GLB,,, | Performed by: FAMILY MEDICINE

## 2021-09-27 PROCEDURE — 3288F PR FALLS RISK ASSESSMENT DOCUMENTED: ICD-10-PCS | Mod: S$GLB,,, | Performed by: FAMILY MEDICINE

## 2021-09-27 PROCEDURE — 3008F PR BODY MASS INDEX (BMI) DOCUMENTED: ICD-10-PCS | Mod: S$GLB,,, | Performed by: FAMILY MEDICINE

## 2021-09-27 PROCEDURE — 99213 PR OFFICE/OUTPT VISIT, EST, LEVL III, 20-29 MIN: ICD-10-PCS | Mod: S$GLB,,, | Performed by: FAMILY MEDICINE

## 2021-09-27 PROCEDURE — 1159F PR MEDICATION LIST DOCUMENTED IN MEDICAL RECORD: ICD-10-PCS | Mod: S$GLB,,, | Performed by: FAMILY MEDICINE

## 2021-09-27 PROCEDURE — 1159F MED LIST DOCD IN RCRD: CPT | Mod: S$GLB,,, | Performed by: FAMILY MEDICINE

## 2021-09-27 PROCEDURE — 1126F AMNT PAIN NOTED NONE PRSNT: CPT | Mod: S$GLB,,, | Performed by: FAMILY MEDICINE

## 2021-09-27 PROCEDURE — 4010F ACE/ARB THERAPY RXD/TAKEN: CPT | Mod: S$GLB,,, | Performed by: FAMILY MEDICINE

## 2021-09-27 PROCEDURE — 99999 PR PBB SHADOW E&M-EST. PATIENT-LVL III: CPT | Mod: PBBFAC,,, | Performed by: FAMILY MEDICINE

## 2021-09-27 PROCEDURE — 3288F FALL RISK ASSESSMENT DOCD: CPT | Mod: S$GLB,,, | Performed by: FAMILY MEDICINE

## 2021-09-27 PROCEDURE — 3078F PR MOST RECENT DIASTOLIC BLOOD PRESSURE < 80 MM HG: ICD-10-PCS | Mod: S$GLB,,, | Performed by: FAMILY MEDICINE

## 2021-09-27 PROCEDURE — 3075F SYST BP GE 130 - 139MM HG: CPT | Mod: S$GLB,,, | Performed by: FAMILY MEDICINE

## 2021-09-27 PROCEDURE — 3078F DIAST BP <80 MM HG: CPT | Mod: S$GLB,,, | Performed by: FAMILY MEDICINE

## 2021-09-27 PROCEDURE — 4010F PR ACE/ARB THEARPY RXD/TAKEN: ICD-10-PCS | Mod: S$GLB,,, | Performed by: FAMILY MEDICINE

## 2021-09-27 PROCEDURE — 99213 OFFICE O/P EST LOW 20 MIN: CPT | Mod: S$GLB,,, | Performed by: FAMILY MEDICINE

## 2021-09-27 PROCEDURE — 99999 PR PBB SHADOW E&M-EST. PATIENT-LVL III: ICD-10-PCS | Mod: PBBFAC,,, | Performed by: FAMILY MEDICINE

## 2021-09-27 PROCEDURE — 1101F PT FALLS ASSESS-DOCD LE1/YR: CPT | Mod: S$GLB,,, | Performed by: FAMILY MEDICINE

## 2021-09-27 RX ORDER — AMLODIPINE BESYLATE 2.5 MG/1
2.5 TABLET ORAL DAILY
Qty: 90 TABLET | Refills: 3 | Status: SHIPPED | OUTPATIENT
Start: 2021-09-27 | End: 2022-03-07 | Stop reason: SDUPTHER

## 2021-09-27 RX ORDER — ALENDRONATE SODIUM 70 MG/1
70 TABLET ORAL
COMMUNITY
Start: 2021-06-06 | End: 2021-11-06

## 2021-12-30 ENCOUNTER — OFFICE VISIT (OUTPATIENT)
Dept: PRIMARY CARE CLINIC | Facility: CLINIC | Age: 71
End: 2021-12-30
Payer: MEDICARE

## 2021-12-30 VITALS
BODY MASS INDEX: 20 KG/M2 | DIASTOLIC BLOOD PRESSURE: 82 MMHG | RESPIRATION RATE: 16 BRPM | HEIGHT: 66 IN | WEIGHT: 124.44 LBS | OXYGEN SATURATION: 97 % | SYSTOLIC BLOOD PRESSURE: 138 MMHG | HEART RATE: 73 BPM

## 2021-12-30 DIAGNOSIS — G47.62 NOCTURNAL LEG CRAMPS: ICD-10-CM

## 2021-12-30 DIAGNOSIS — R73.03 BORDERLINE DIABETES: ICD-10-CM

## 2021-12-30 DIAGNOSIS — N32.81 OVERACTIVE BLADDER: ICD-10-CM

## 2021-12-30 DIAGNOSIS — M79.662 PAIN IN LEFT LOWER LEG: ICD-10-CM

## 2021-12-30 DIAGNOSIS — M41.9 SCOLIOSIS, UNSPECIFIED SCOLIOSIS TYPE, UNSPECIFIED SPINAL REGION: Chronic | ICD-10-CM

## 2021-12-30 DIAGNOSIS — G62.9 NEUROPATHY: Primary | ICD-10-CM

## 2021-12-30 DIAGNOSIS — Z87.442 HISTORY OF NEPHROLITHIASIS: ICD-10-CM

## 2021-12-30 DIAGNOSIS — I10 HTN (HYPERTENSION), BENIGN: ICD-10-CM

## 2021-12-30 PROCEDURE — G0009 PNEUMOCOCCAL POLYSACCHARIDE VACCINE 23-VALENT =>2YO SQ IM: ICD-10-PCS | Mod: S$GLB,,, | Performed by: FAMILY MEDICINE

## 2021-12-30 PROCEDURE — 4010F PR ACE/ARB THEARPY RXD/TAKEN: ICD-10-PCS | Mod: CPTII,S$GLB,, | Performed by: FAMILY MEDICINE

## 2021-12-30 PROCEDURE — 3075F PR MOST RECENT SYSTOLIC BLOOD PRESS GE 130-139MM HG: ICD-10-PCS | Mod: CPTII,S$GLB,, | Performed by: FAMILY MEDICINE

## 2021-12-30 PROCEDURE — 99999 PR PBB SHADOW E&M-EST. PATIENT-LVL III: CPT | Mod: PBBFAC,,, | Performed by: FAMILY MEDICINE

## 2021-12-30 PROCEDURE — 3288F FALL RISK ASSESSMENT DOCD: CPT | Mod: CPTII,S$GLB,, | Performed by: FAMILY MEDICINE

## 2021-12-30 PROCEDURE — 3075F SYST BP GE 130 - 139MM HG: CPT | Mod: CPTII,S$GLB,, | Performed by: FAMILY MEDICINE

## 2021-12-30 PROCEDURE — 3079F PR MOST RECENT DIASTOLIC BLOOD PRESSURE 80-89 MM HG: ICD-10-PCS | Mod: CPTII,S$GLB,, | Performed by: FAMILY MEDICINE

## 2021-12-30 PROCEDURE — G0008 FLU VACCINE - QUADRIVALENT - ADJUVANTED: ICD-10-PCS | Mod: S$GLB,,, | Performed by: FAMILY MEDICINE

## 2021-12-30 PROCEDURE — 3288F PR FALLS RISK ASSESSMENT DOCUMENTED: ICD-10-PCS | Mod: CPTII,S$GLB,, | Performed by: FAMILY MEDICINE

## 2021-12-30 PROCEDURE — 1125F AMNT PAIN NOTED PAIN PRSNT: CPT | Mod: CPTII,S$GLB,, | Performed by: FAMILY MEDICINE

## 2021-12-30 PROCEDURE — 99999 PR PBB SHADOW E&M-EST. PATIENT-LVL III: ICD-10-PCS | Mod: PBBFAC,,, | Performed by: FAMILY MEDICINE

## 2021-12-30 PROCEDURE — 3079F DIAST BP 80-89 MM HG: CPT | Mod: CPTII,S$GLB,, | Performed by: FAMILY MEDICINE

## 2021-12-30 PROCEDURE — 4010F ACE/ARB THERAPY RXD/TAKEN: CPT | Mod: CPTII,S$GLB,, | Performed by: FAMILY MEDICINE

## 2021-12-30 PROCEDURE — 3008F PR BODY MASS INDEX (BMI) DOCUMENTED: ICD-10-PCS | Mod: CPTII,S$GLB,, | Performed by: FAMILY MEDICINE

## 2021-12-30 PROCEDURE — 99214 PR OFFICE/OUTPT VISIT, EST, LEVL IV, 30-39 MIN: ICD-10-PCS | Mod: 25,S$GLB,, | Performed by: FAMILY MEDICINE

## 2021-12-30 PROCEDURE — 1101F PR PT FALLS ASSESS DOC 0-1 FALLS W/OUT INJ PAST YR: ICD-10-PCS | Mod: CPTII,S$GLB,, | Performed by: FAMILY MEDICINE

## 2021-12-30 PROCEDURE — 90694 FLU VACCINE - QUADRIVALENT - ADJUVANTED: ICD-10-PCS | Mod: S$GLB,,, | Performed by: FAMILY MEDICINE

## 2021-12-30 PROCEDURE — G0008 ADMIN INFLUENZA VIRUS VAC: HCPCS | Mod: S$GLB,,, | Performed by: FAMILY MEDICINE

## 2021-12-30 PROCEDURE — G0009 ADMIN PNEUMOCOCCAL VACCINE: HCPCS | Mod: S$GLB,,, | Performed by: FAMILY MEDICINE

## 2021-12-30 PROCEDURE — 90732 PNEUMOCOCCAL POLYSACCHARIDE VACCINE 23-VALENT =>2YO SQ IM: ICD-10-PCS | Mod: S$GLB,,, | Performed by: FAMILY MEDICINE

## 2021-12-30 PROCEDURE — 1125F PR PAIN SEVERITY QUANTIFIED, PAIN PRESENT: ICD-10-PCS | Mod: CPTII,S$GLB,, | Performed by: FAMILY MEDICINE

## 2021-12-30 PROCEDURE — 99214 OFFICE O/P EST MOD 30 MIN: CPT | Mod: 25,S$GLB,, | Performed by: FAMILY MEDICINE

## 2021-12-30 PROCEDURE — 1101F PT FALLS ASSESS-DOCD LE1/YR: CPT | Mod: CPTII,S$GLB,, | Performed by: FAMILY MEDICINE

## 2021-12-30 PROCEDURE — 90732 PPSV23 VACC 2 YRS+ SUBQ/IM: CPT | Mod: S$GLB,,, | Performed by: FAMILY MEDICINE

## 2021-12-30 PROCEDURE — 90694 VACC AIIV4 NO PRSRV 0.5ML IM: CPT | Mod: S$GLB,,, | Performed by: FAMILY MEDICINE

## 2021-12-30 PROCEDURE — 3008F BODY MASS INDEX DOCD: CPT | Mod: CPTII,S$GLB,, | Performed by: FAMILY MEDICINE

## 2021-12-30 RX ORDER — BETAMETHASONE VALERATE 1.2 MG/G
CREAM TOPICAL 2 TIMES DAILY
Qty: 60 G | Refills: 2 | Status: SHIPPED | OUTPATIENT
Start: 2021-12-30 | End: 2022-03-07 | Stop reason: SDUPTHER

## 2021-12-30 RX ORDER — GABAPENTIN 300 MG/1
CAPSULE ORAL
Qty: 30 CAPSULE | Refills: 5 | Status: SHIPPED | OUTPATIENT
Start: 2021-12-30 | End: 2022-03-07 | Stop reason: SDUPTHER

## 2022-03-07 ENCOUNTER — OFFICE VISIT (OUTPATIENT)
Dept: PRIMARY CARE CLINIC | Facility: CLINIC | Age: 72
End: 2022-03-07
Payer: MEDICARE

## 2022-03-07 VITALS
WEIGHT: 128 LBS | HEIGHT: 66 IN | DIASTOLIC BLOOD PRESSURE: 88 MMHG | RESPIRATION RATE: 18 BRPM | SYSTOLIC BLOOD PRESSURE: 134 MMHG | HEART RATE: 74 BPM | OXYGEN SATURATION: 94 % | BODY MASS INDEX: 20.57 KG/M2

## 2022-03-07 DIAGNOSIS — K21.00 GASTROESOPHAGEAL REFLUX DISEASE WITH ESOPHAGITIS WITHOUT HEMORRHAGE: ICD-10-CM

## 2022-03-07 DIAGNOSIS — Z12.31 ENCOUNTER FOR SCREENING MAMMOGRAM FOR MALIGNANT NEOPLASM OF BREAST: ICD-10-CM

## 2022-03-07 DIAGNOSIS — R73.03 BORDERLINE DIABETES: ICD-10-CM

## 2022-03-07 DIAGNOSIS — M41.05 INFANTILE IDIOPATHIC SCOLIOSIS OF THORACOLUMBAR REGION: Chronic | ICD-10-CM

## 2022-03-07 DIAGNOSIS — N32.81 OVERACTIVE BLADDER: ICD-10-CM

## 2022-03-07 DIAGNOSIS — I10 HTN (HYPERTENSION), BENIGN: ICD-10-CM

## 2022-03-07 DIAGNOSIS — Z87.442 HISTORY OF NEPHROLITHIASIS: ICD-10-CM

## 2022-03-07 DIAGNOSIS — I73.9 PERIPHERAL VASCULAR DISEASE: ICD-10-CM

## 2022-03-07 DIAGNOSIS — Z00.00 ANNUAL PHYSICAL EXAM: Primary | ICD-10-CM

## 2022-03-07 DIAGNOSIS — G47.62 NOCTURNAL LEG CRAMPS: ICD-10-CM

## 2022-03-07 PROCEDURE — 1101F PR PT FALLS ASSESS DOC 0-1 FALLS W/OUT INJ PAST YR: ICD-10-PCS | Mod: CPTII,S$GLB,, | Performed by: FAMILY MEDICINE

## 2022-03-07 PROCEDURE — 99213 OFFICE O/P EST LOW 20 MIN: CPT | Mod: S$GLB,,, | Performed by: FAMILY MEDICINE

## 2022-03-07 PROCEDURE — 1159F PR MEDICATION LIST DOCUMENTED IN MEDICAL RECORD: ICD-10-PCS | Mod: CPTII,S$GLB,, | Performed by: FAMILY MEDICINE

## 2022-03-07 PROCEDURE — 3044F HG A1C LEVEL LT 7.0%: CPT | Mod: CPTII,S$GLB,, | Performed by: FAMILY MEDICINE

## 2022-03-07 PROCEDURE — 3008F PR BODY MASS INDEX (BMI) DOCUMENTED: ICD-10-PCS | Mod: CPTII,S$GLB,, | Performed by: FAMILY MEDICINE

## 2022-03-07 PROCEDURE — 1126F PR PAIN SEVERITY QUANTIFIED, NO PAIN PRESENT: ICD-10-PCS | Mod: CPTII,S$GLB,, | Performed by: FAMILY MEDICINE

## 2022-03-07 PROCEDURE — 99213 PR OFFICE/OUTPT VISIT, EST, LEVL III, 20-29 MIN: ICD-10-PCS | Mod: S$GLB,,, | Performed by: FAMILY MEDICINE

## 2022-03-07 PROCEDURE — 3079F PR MOST RECENT DIASTOLIC BLOOD PRESSURE 80-89 MM HG: ICD-10-PCS | Mod: CPTII,S$GLB,, | Performed by: FAMILY MEDICINE

## 2022-03-07 PROCEDURE — 99999 PR PBB SHADOW E&M-EST. PATIENT-LVL III: ICD-10-PCS | Mod: PBBFAC,,, | Performed by: FAMILY MEDICINE

## 2022-03-07 PROCEDURE — 1159F MED LIST DOCD IN RCRD: CPT | Mod: CPTII,S$GLB,, | Performed by: FAMILY MEDICINE

## 2022-03-07 PROCEDURE — 1126F AMNT PAIN NOTED NONE PRSNT: CPT | Mod: CPTII,S$GLB,, | Performed by: FAMILY MEDICINE

## 2022-03-07 PROCEDURE — 3044F PR MOST RECENT HEMOGLOBIN A1C LEVEL <7.0%: ICD-10-PCS | Mod: CPTII,S$GLB,, | Performed by: FAMILY MEDICINE

## 2022-03-07 PROCEDURE — 3079F DIAST BP 80-89 MM HG: CPT | Mod: CPTII,S$GLB,, | Performed by: FAMILY MEDICINE

## 2022-03-07 PROCEDURE — 3075F SYST BP GE 130 - 139MM HG: CPT | Mod: CPTII,S$GLB,, | Performed by: FAMILY MEDICINE

## 2022-03-07 PROCEDURE — 3075F PR MOST RECENT SYSTOLIC BLOOD PRESS GE 130-139MM HG: ICD-10-PCS | Mod: CPTII,S$GLB,, | Performed by: FAMILY MEDICINE

## 2022-03-07 PROCEDURE — 3008F BODY MASS INDEX DOCD: CPT | Mod: CPTII,S$GLB,, | Performed by: FAMILY MEDICINE

## 2022-03-07 PROCEDURE — 3288F FALL RISK ASSESSMENT DOCD: CPT | Mod: CPTII,S$GLB,, | Performed by: FAMILY MEDICINE

## 2022-03-07 PROCEDURE — 3288F PR FALLS RISK ASSESSMENT DOCUMENTED: ICD-10-PCS | Mod: CPTII,S$GLB,, | Performed by: FAMILY MEDICINE

## 2022-03-07 PROCEDURE — 1101F PT FALLS ASSESS-DOCD LE1/YR: CPT | Mod: CPTII,S$GLB,, | Performed by: FAMILY MEDICINE

## 2022-03-07 PROCEDURE — 99999 PR PBB SHADOW E&M-EST. PATIENT-LVL III: CPT | Mod: PBBFAC,,, | Performed by: FAMILY MEDICINE

## 2022-03-07 RX ORDER — GABAPENTIN 300 MG/1
CAPSULE ORAL
Qty: 90 CAPSULE | Refills: 1 | Status: SHIPPED | OUTPATIENT
Start: 2022-03-07 | End: 2022-05-17

## 2022-03-07 RX ORDER — FLUTICASONE PROPIONATE 50 MCG
1 SPRAY, SUSPENSION (ML) NASAL DAILY
Qty: 16 G | Refills: 5 | Status: SHIPPED | OUTPATIENT
Start: 2022-03-07 | End: 2023-08-31

## 2022-03-07 RX ORDER — AMLODIPINE BESYLATE 2.5 MG/1
2.5 TABLET ORAL DAILY
Qty: 90 TABLET | Refills: 1 | Status: SHIPPED | OUTPATIENT
Start: 2022-03-07 | End: 2022-08-17 | Stop reason: SDUPTHER

## 2022-03-07 RX ORDER — BETAMETHASONE VALERATE 1.2 MG/G
CREAM TOPICAL 2 TIMES DAILY
Qty: 60 G | Refills: 2 | Status: SHIPPED | OUTPATIENT
Start: 2022-03-07 | End: 2022-03-07 | Stop reason: CLARIF

## 2022-03-07 RX ORDER — OMEPRAZOLE 40 MG/1
40 CAPSULE, DELAYED RELEASE ORAL DAILY
Qty: 90 CAPSULE | Refills: 1 | Status: SHIPPED | OUTPATIENT
Start: 2022-03-07 | End: 2022-08-17 | Stop reason: SDUPTHER

## 2022-03-07 RX ORDER — BETAMETHASONE VALERATE 1.2 MG/G
CREAM TOPICAL 2 TIMES DAILY
Qty: 60 G | Refills: 2 | Status: SHIPPED | OUTPATIENT
Start: 2022-03-07 | End: 2023-07-19

## 2022-03-07 RX ORDER — ALENDRONATE SODIUM 70 MG/1
TABLET ORAL
Qty: 12 TABLET | Refills: 3 | Status: SHIPPED | OUTPATIENT
Start: 2022-03-07 | End: 2023-03-20

## 2022-03-07 RX ORDER — FLUTICASONE PROPIONATE 50 MCG
1 SPRAY, SUSPENSION (ML) NASAL DAILY
Qty: 16 G | Refills: 5 | Status: SHIPPED | OUTPATIENT
Start: 2022-03-07 | End: 2022-03-07 | Stop reason: SDUPTHER

## 2022-03-07 RX ORDER — OXYBUTYNIN CHLORIDE 10 MG/1
10 TABLET, EXTENDED RELEASE ORAL DAILY
Qty: 90 TABLET | Refills: 1 | Status: SHIPPED | OUTPATIENT
Start: 2022-03-07 | End: 2022-08-17 | Stop reason: SDUPTHER

## 2022-03-07 NOTE — PROGRESS NOTES
Subjective:       Patient ID: Alexandria Estrada is a 72 y.o. female.    Chief Complaint: Annual Exam    HPI: 71 yo WF --annual wellness exam---eating well--+BM--ambulating well.  Lab CBC CMP lipid cholesterol 211 HDL 60  better of 100 hemoglobin A1c 5 point set thyroid normal chest x-ray S shaped severe scoliosis EKG first-degree AV block possible left atrial enlargement overall normal     ROS:  Skin: no psoriasis, eczema, skin cancer skin lesion  no recent problems  HEENT: ,no ocular pain, blurred vision, diplopia, epistaxis, hoarseness no change in voice thyroid trouble   Lung: No pneumonia, asthma, Tb, wheezing, SOB, no smoking  Heart: No chest pain, ankle edema, palpitations, MI, elaine murmur, +hypertension blood pressure ,no hyperlipidemia--no stent bypass arrhythmia--positive peripheral vascular disease see history of present illness  Abdomen:  +GERD with certain foods No nausea, vomiting,  diarrhea, constipation, ulcers, hepatitis, gallbladder disease, melena, hematochezia, hematemesis  :  Has overactive bladder + hx UTI OK now    history renal stone 20 years ago-on oxybutynin for bladder spasm  GYN last PAP 9-10 yrs ago--just had mammogram 01/28/2021  MS: no fractures, + O/A--needs hands severe scoliosis lumbar spine, right heel --nocturnal leg cramps--as pain in the left lower leg lateral aspect and left heel  Neuro:  No dizziness,no  LOC, seizures   No diabetes, no anemia, + anxiety, + depression--pandemic  --family issue -3 extra people in house --nephew, girlfriend and son 17 work in dining room    , 2 daughters, work retired ,  lives with  father--93 yo father with heart disease bladder cancer lives with patient--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    Objective:   Physical Exam:  General: Well nourished, well developed, no acute distress +thin  Skin: No lesions  HEENT: Eyes PERRLA, EOM intact, nose clear  throat nonerythematous ears clear fluid  NECK: Supple, no bruits, No  JVD, no nodes  Lungs: Clear, no rales, rhonchi, wheezing   Heart: Regular rate and rhythm, no murmurs, gallops, or rubs  Abdomen: flat, bowel sounds positive, no tenderness, or organomegaly some pressure in suprapubic area and groin area  MS: Severe scoliosis of the lumbar spine and thoracic spine, pain right heel mainly at the base of the heel and Achilles area more with weight-bearing then with palpation or flexion extension or inversion or eversion of the foot more with weight-bearing  Neuro: Alert, CN intact, oriented X 3  Extremities: No cyanosis, clubbing, or edema         Assessment:       1. Encounter for screening mammogram for malignant neoplasm of breast    2. Peripheral vascular disease        Plan:       Encounter for screening mammogram for malignant neoplasm of breast  -     Mammo Digital Screening Bilat; Future; Expected date: 03/07/2022    Peripheral vascular disease    Other orders  -     alendronate (FOSAMAX) 70 MG tablet; TAKE 1 TABLET ONCE A WEEK  Dispense: 12 tablet; Refill: 3  -     amLODIPine (NORVASC) 2.5 MG tablet; Take 1 tablet (2.5 mg total) by mouth once daily.  Dispense: 90 tablet; Refill: 1  -     betamethasone valerate 0.1% (VALISONE) 0.1 % Crea; Apply topically 2 (two) times daily. Applied to his skin lesions left lower leg medial lower leg and left medial ankle and heel  Dispense: 60 g; Refill: 2  -     fluticasone propionate (FLONASE) 50 mcg/actuation nasal spray; 1 spray (50 mcg total) by Each Nostril route once daily.  Dispense: 16 g; Refill: 5  -     gabapentin (NEURONTIN) 300 MG capsule; One p.o. q.h.s. for neuropathy or restless leg  Dispense: 90 capsule; Refill: 1  -     omeprazole (PRILOSEC) 40 MG capsule; Take 1 capsule (40 mg total) by mouth once daily.  Dispense: 90 capsule; Refill: 1  -     oxybutynin (DITROPAN-XL) 10 MG 24 hr tablet; Take 1 tablet (10 mg total) by mouth once daily.  Dispense: 90 tablet; Refill: 1        In for annual physical exam---  Left leg pain --  lateral lower leg and left medial Achilles area --some hypopigmentation in these areas--the patient states the pain occurs mainly at night and early in the morning and seems deeper than the skin--has history of restless legs syndrome--severe scoliosis--probably a neuropathy--will try Valisone cream to the skin b.i.d. and gabapentin 300 mg q.h.s.  Needs immunizations    Hypertension blood pressure was 138/82 --now amlodipine 2.5 qd-- low Na diet--do daily blood pressure checks 1 blood pressure to be 139/89 or less than greater than 90/60  Other medical issues   History of GERD on omeprazole doing well  History osteoporosis on Fosamax  History of allergies on Flonase  History overactive bladder on Ditropan  Severe scoliosis/restless leg syndrome/history nephrolithiasis  Health maintenance flu and pneumococcal vaccine  Anxiety and stress--has nephew/his girlfriend/and son all living with patient using the dining room as their office  Lab CBC lipid CMP hemoglobin A1c T4 TSH chest Xray EKG due at patient's convenience to do lab in March

## 2022-03-07 NOTE — TELEPHONE ENCOUNTER
No new care gaps identified.  Powered by [employer]. Reference number: [id number].   3/06/2022 11:50:35 PM CST

## 2022-03-08 RX ORDER — OMEPRAZOLE 40 MG/1
CAPSULE, DELAYED RELEASE ORAL
Qty: 90 CAPSULE | Refills: 3 | OUTPATIENT
Start: 2022-03-08

## 2022-03-08 NOTE — TELEPHONE ENCOUNTER
Quick DC. Request already responded to by other means (e.g. phone or fax)  This medication has been reordered already and receipt confirmed by pharmacy. Will remove duplicate and close out encounter.

## 2022-04-12 ENCOUNTER — HOSPITAL ENCOUNTER (OUTPATIENT)
Dept: RADIOLOGY | Facility: HOSPITAL | Age: 72
Discharge: HOME | End: 2022-04-12
Attending: FAMILY MEDICINE
Payer: MEDICARE

## 2022-04-12 VITALS — BODY MASS INDEX: 20.55 KG/M2 | WEIGHT: 127.88 LBS | HEIGHT: 66 IN

## 2022-04-12 DIAGNOSIS — Z12.31 ENCOUNTER FOR SCREENING MAMMOGRAM FOR MALIGNANT NEOPLASM OF BREAST: ICD-10-CM

## 2022-04-12 PROCEDURE — 77063 BREAST TOMOSYNTHESIS BI: CPT | Mod: TC,PO

## 2022-04-26 ENCOUNTER — PATIENT MESSAGE (OUTPATIENT)
Dept: PRIMARY CARE CLINIC | Facility: CLINIC | Age: 72
End: 2022-04-26
Payer: MEDICARE

## 2022-05-17 ENCOUNTER — OFFICE VISIT (OUTPATIENT)
Dept: PRIMARY CARE CLINIC | Facility: CLINIC | Age: 72
End: 2022-05-17
Payer: MEDICARE

## 2022-05-17 VITALS
SYSTOLIC BLOOD PRESSURE: 130 MMHG | OXYGEN SATURATION: 97 % | HEIGHT: 66 IN | RESPIRATION RATE: 18 BRPM | BODY MASS INDEX: 20.78 KG/M2 | WEIGHT: 129.31 LBS | HEART RATE: 74 BPM | DIASTOLIC BLOOD PRESSURE: 88 MMHG

## 2022-05-17 DIAGNOSIS — M41.9 SCOLIOSIS OF THORACOLUMBAR SPINE, UNSPECIFIED SCOLIOSIS TYPE: Chronic | ICD-10-CM

## 2022-05-17 DIAGNOSIS — M54.50 ACUTE BILATERAL LOW BACK PAIN WITHOUT SCIATICA: ICD-10-CM

## 2022-05-17 DIAGNOSIS — N32.81 OVERACTIVE BLADDER: ICD-10-CM

## 2022-05-17 DIAGNOSIS — R73.03 BORDERLINE DIABETES: ICD-10-CM

## 2022-05-17 DIAGNOSIS — G62.9 NEUROPATHY: Primary | ICD-10-CM

## 2022-05-17 DIAGNOSIS — I73.9 PERIPHERAL VASCULAR DISEASE: ICD-10-CM

## 2022-05-17 DIAGNOSIS — M19.072 OSTEOARTHRITIS OF LEFT ANKLE AND FOOT: ICD-10-CM

## 2022-05-17 DIAGNOSIS — S39.012D LUMBOSACRAL STRAIN, SUBSEQUENT ENCOUNTER: ICD-10-CM

## 2022-05-17 DIAGNOSIS — I10 HTN (HYPERTENSION), BENIGN: ICD-10-CM

## 2022-05-17 PROCEDURE — 3008F BODY MASS INDEX DOCD: CPT | Mod: CPTII,S$GLB,, | Performed by: FAMILY MEDICINE

## 2022-05-17 PROCEDURE — 3044F HG A1C LEVEL LT 7.0%: CPT | Mod: CPTII,S$GLB,, | Performed by: FAMILY MEDICINE

## 2022-05-17 PROCEDURE — 3008F PR BODY MASS INDEX (BMI) DOCUMENTED: ICD-10-PCS | Mod: CPTII,S$GLB,, | Performed by: FAMILY MEDICINE

## 2022-05-17 PROCEDURE — 3079F DIAST BP 80-89 MM HG: CPT | Mod: CPTII,S$GLB,, | Performed by: FAMILY MEDICINE

## 2022-05-17 PROCEDURE — 3075F PR MOST RECENT SYSTOLIC BLOOD PRESS GE 130-139MM HG: ICD-10-PCS | Mod: CPTII,S$GLB,, | Performed by: FAMILY MEDICINE

## 2022-05-17 PROCEDURE — 99999 PR PBB SHADOW E&M-EST. PATIENT-LVL III: CPT | Mod: PBBFAC,,, | Performed by: FAMILY MEDICINE

## 2022-05-17 PROCEDURE — 3075F SYST BP GE 130 - 139MM HG: CPT | Mod: CPTII,S$GLB,, | Performed by: FAMILY MEDICINE

## 2022-05-17 PROCEDURE — 3044F PR MOST RECENT HEMOGLOBIN A1C LEVEL <7.0%: ICD-10-PCS | Mod: CPTII,S$GLB,, | Performed by: FAMILY MEDICINE

## 2022-05-17 PROCEDURE — 3079F PR MOST RECENT DIASTOLIC BLOOD PRESSURE 80-89 MM HG: ICD-10-PCS | Mod: CPTII,S$GLB,, | Performed by: FAMILY MEDICINE

## 2022-05-17 PROCEDURE — 1159F PR MEDICATION LIST DOCUMENTED IN MEDICAL RECORD: ICD-10-PCS | Mod: CPTII,S$GLB,, | Performed by: FAMILY MEDICINE

## 2022-05-17 PROCEDURE — 1125F AMNT PAIN NOTED PAIN PRSNT: CPT | Mod: CPTII,S$GLB,, | Performed by: FAMILY MEDICINE

## 2022-05-17 PROCEDURE — 1101F PT FALLS ASSESS-DOCD LE1/YR: CPT | Mod: CPTII,S$GLB,, | Performed by: FAMILY MEDICINE

## 2022-05-17 PROCEDURE — 99999 PR PBB SHADOW E&M-EST. PATIENT-LVL III: ICD-10-PCS | Mod: PBBFAC,,, | Performed by: FAMILY MEDICINE

## 2022-05-17 PROCEDURE — 3288F FALL RISK ASSESSMENT DOCD: CPT | Mod: CPTII,S$GLB,, | Performed by: FAMILY MEDICINE

## 2022-05-17 PROCEDURE — 99214 OFFICE O/P EST MOD 30 MIN: CPT | Mod: S$GLB,,, | Performed by: FAMILY MEDICINE

## 2022-05-17 PROCEDURE — 99214 PR OFFICE/OUTPT VISIT, EST, LEVL IV, 30-39 MIN: ICD-10-PCS | Mod: S$GLB,,, | Performed by: FAMILY MEDICINE

## 2022-05-17 PROCEDURE — 1101F PR PT FALLS ASSESS DOC 0-1 FALLS W/OUT INJ PAST YR: ICD-10-PCS | Mod: CPTII,S$GLB,, | Performed by: FAMILY MEDICINE

## 2022-05-17 PROCEDURE — 3288F PR FALLS RISK ASSESSMENT DOCUMENTED: ICD-10-PCS | Mod: CPTII,S$GLB,, | Performed by: FAMILY MEDICINE

## 2022-05-17 PROCEDURE — 1159F MED LIST DOCD IN RCRD: CPT | Mod: CPTII,S$GLB,, | Performed by: FAMILY MEDICINE

## 2022-05-17 PROCEDURE — 1125F PR PAIN SEVERITY QUANTIFIED, PAIN PRESENT: ICD-10-PCS | Mod: CPTII,S$GLB,, | Performed by: FAMILY MEDICINE

## 2022-05-17 RX ORDER — DICLOFENAC SODIUM 50 MG/1
TABLET, DELAYED RELEASE ORAL
Qty: 60 TABLET | Refills: 5 | Status: SHIPPED | OUTPATIENT
Start: 2022-05-17 | End: 2022-08-17

## 2022-05-17 RX ORDER — GABAPENTIN 300 MG/1
300 CAPSULE ORAL 3 TIMES DAILY
Qty: 270 CAPSULE | Refills: 1 | Status: SHIPPED | OUTPATIENT
Start: 2022-05-17 | End: 2022-05-23 | Stop reason: SDUPTHER

## 2022-05-17 NOTE — PROGRESS NOTES
Subjective:       Patient ID: Alexandria Estrada is a 72 y.o. female.    Chief Complaint: Medication Problem and Back Pain    HPI: 71 yo WF --medication changes--back pain.  Gabapentin 300mg once day on x bid . When left foot bothers pt can't sleep--years ago patient smashed healed--but main problem of the legs neuropathy--lateral aspect of leg--wearing shoes aggravates it --if in flip flops better--I time and the day if does not take b.i.d. can't sleep.  Back pain may have hurt at gym--leg lifts--last Fri aggravated it --has roll out bed AM and hold onto something.  Able to bend forward but not side-to-side.      ROS:  Skin: no psoriasis, eczema, skin cancer skin lesion  no recent problems  HEENT: ,no ocular pain, blurred vision, diplopia, epistaxis, hoarseness no change in voice thyroid trouble   Lung: No pneumonia, asthma, Tb, wheezing, SOB, no smoking  Heart: No chest pain, ankle edema, palpitations, MI, elaine murmur, +hypertension blood pressure ,no hyperlipidemia--no stent bypass arrhythmia--positive peripheral vascular disease see history of present illness  Abdomen:  +GERD with certain foods No nausea, vomiting,  diarrhea, constipation, ulcers, hepatitis, gallbladder disease, melena, hematochezia, hematemesis  :  Has overactive bladder + hx UTI OK now    history renal stone 20 years ago-on oxybutynin for bladder spasm  GYN last PAP 9-10 yrs ago--just had mammogram 2021  MS: no fractures, + O/A--needs hands severe scoliosis lumbar spine, right heel --nocturnal leg cramps--as pain in the left lower leg lateral aspect and left heel  Neuro:  No dizziness,no  LOC, seizures   No diabetes, no anemia, + anxiety, + depression--pandemic  --family issue -3 extra people in house -father   yo -nephew, girlfriend and son 17 work in dining room    , 2 daughters, work retired ,  lives with  father--93 yo father with heart disease bladder cancer lives with patient--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    Objective:   Physical Exam:  General: Well nourished, well developed, no acute distress +thin  Skin: No lesions  HEENT: Eyes PERRLA, EOM intact, nose clear  throat nonerythematous ears clear fluid  NECK: Supple, no bruits, No JVD, no nodes  Lungs: Clear, no rales, rhonchi, wheezing   Heart: Regular rate and rhythm, no murmurs, gallops, or rubs  Abdomen: flat, bowel sounds positive, no tenderness, or organomegaly some pressure in suprapubic area and groin area  MS: Severe scoliosis of the lumbar spine and thoracic spine, pain right heel mainly at the base of the heel and Achilles area more with weight-bearing then with palpation or flexion extension or inversion or eversion of the foot more with weight-bearing  Neuro: Alert, CN intact, oriented X 3  Extremities: No cyanosis, clubbing, or edema         Assessment:       1. Neuropathy    2. Acute bilateral low back pain without sciatica    3. Scoliosis of thoracolumbar spine, unspecified scoliosis type    4. HTN (hypertension), benign    5. Overactive bladder    6. Borderline diabetes    7. Osteoarthritis of left ankle and foot    8. Lumbosacral strain, subsequent encounter    9. Peripheral vascular disease        Plan:       Neuropathy    Acute bilateral low back pain without sciatica    Scoliosis of thoracolumbar spine, unspecified scoliosis type    HTN (hypertension), benign    Overactive bladder    Borderline diabetes    Osteoarthritis of left ankle and foot    Lumbosacral strain, subsequent encounter    Peripheral vascular disease    Other orders  -     gabapentin (NEURONTIN) 300 MG capsule; Take 1 capsule (300 mg total) by mouth 3 (three) times daily.  Dispense: 270 capsule; Refill: 1  -     diclofenac (VOLTAREN) 50 MG EC tablet; One p.o. b.i.d. p.r.n. pain no other NSAIDs can take with Tylenol if necessary can increase to 1 p.o. t.i.d.  Dispense: 60 tablet; Refill: 5        In for annual physical exam---  Left foot and ankle pain  neuropathy--patient needs to increase gabapentin from wants to twice a day--told can increase to t.i.d. if needed--also comes in the 600 and 800 mg tablets.  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 diclofenac 50 mg b.i.d.   Other medical issues   Hypertension blood pressure was 138/82 --now amlodipine 2.5 qd-- low Na diet--do daily blood pressure checks 1 blood pressure to be 139/89 or less than greater than 90/60  Other medical issues   History of GERD on omeprazole doing well  History osteoporosis on Fosamax  History of allergies on Flonase  History overactive bladder on Ditropan  Severe scoliosis/restless leg syndrome/history nephrolithiasis  Health maintenance COVID vaccine  Anxiety and stress-95-year-old father  in March-has nephew/his girlfriend/and son all living with patient using the dining room as their office  Lab done in February no new labs told at least August

## 2022-05-23 NOTE — TELEPHONE ENCOUNTER
No new care gaps identified.  [facility] Embedded Care Gaps. Reference number: [id number]. 5/23/2022   10:26:59 AM NEVILLET

## 2022-05-23 NOTE — TELEPHONE ENCOUNTER
----- Message from Nayely Cramer sent at 5/23/2022  9:26 AM CDT -----  Regarding: Prescription  Contact: [phone]  Calling to speak with nurse regarding prescription for Rx gabapentin (NEURONTIN) 300 MG capsule. Please call

## 2022-05-24 ENCOUNTER — PATIENT MESSAGE (OUTPATIENT)
Dept: PRIMARY CARE CLINIC | Facility: CLINIC | Age: 72
End: 2022-05-24
Payer: MEDICARE

## 2022-05-24 RX ORDER — GABAPENTIN 300 MG/1
300 CAPSULE ORAL 3 TIMES DAILY
Qty: 270 CAPSULE | Refills: 1 | Status: SHIPPED | OUTPATIENT
Start: 2022-05-24 | End: 2022-08-17 | Stop reason: SDUPTHER

## 2022-07-06 ENCOUNTER — OFFICE VISIT (OUTPATIENT)
Dept: PRIMARY CARE CLINIC | Facility: CLINIC | Age: 72
End: 2022-07-06
Payer: MEDICARE

## 2022-07-06 ENCOUNTER — TELEPHONE (OUTPATIENT)
Dept: PRIMARY CARE CLINIC | Facility: CLINIC | Age: 72
End: 2022-07-06
Payer: MEDICARE

## 2022-07-06 VITALS
SYSTOLIC BLOOD PRESSURE: 122 MMHG | BODY MASS INDEX: 20.97 KG/M2 | RESPIRATION RATE: 18 BRPM | DIASTOLIC BLOOD PRESSURE: 88 MMHG | WEIGHT: 130.5 LBS | OXYGEN SATURATION: 97 % | HEIGHT: 66 IN | HEART RATE: 70 BPM | TEMPERATURE: 98 F

## 2022-07-06 DIAGNOSIS — I10 HTN (HYPERTENSION), BENIGN: ICD-10-CM

## 2022-07-06 DIAGNOSIS — J01.00 ACUTE NON-RECURRENT MAXILLARY SINUSITIS: Primary | ICD-10-CM

## 2022-07-06 DIAGNOSIS — I73.9 PERIPHERAL VASCULAR DISEASE: ICD-10-CM

## 2022-07-06 DIAGNOSIS — M41.25 OTHER IDIOPATHIC SCOLIOSIS, THORACOLUMBAR REGION: Chronic | ICD-10-CM

## 2022-07-06 DIAGNOSIS — R73.03 BORDERLINE DIABETES: ICD-10-CM

## 2022-07-06 DIAGNOSIS — Z87.442 HISTORY OF NEPHROLITHIASIS: ICD-10-CM

## 2022-07-06 PROCEDURE — 3008F PR BODY MASS INDEX (BMI) DOCUMENTED: ICD-10-PCS | Mod: CPTII,S$GLB,, | Performed by: FAMILY MEDICINE

## 2022-07-06 PROCEDURE — 3074F SYST BP LT 130 MM HG: CPT | Mod: CPTII,S$GLB,, | Performed by: FAMILY MEDICINE

## 2022-07-06 PROCEDURE — 96372 PR INJECTION,THERAP/PROPH/DIAG2ST, IM OR SUBCUT: ICD-10-PCS | Mod: S$GLB,,, | Performed by: FAMILY MEDICINE

## 2022-07-06 PROCEDURE — 3079F PR MOST RECENT DIASTOLIC BLOOD PRESSURE 80-89 MM HG: ICD-10-PCS | Mod: CPTII,S$GLB,, | Performed by: FAMILY MEDICINE

## 2022-07-06 PROCEDURE — 3288F FALL RISK ASSESSMENT DOCD: CPT | Mod: CPTII,S$GLB,, | Performed by: FAMILY MEDICINE

## 2022-07-06 PROCEDURE — 1101F PT FALLS ASSESS-DOCD LE1/YR: CPT | Mod: CPTII,S$GLB,, | Performed by: FAMILY MEDICINE

## 2022-07-06 PROCEDURE — 3044F PR MOST RECENT HEMOGLOBIN A1C LEVEL <7.0%: ICD-10-PCS | Mod: CPTII,S$GLB,, | Performed by: FAMILY MEDICINE

## 2022-07-06 PROCEDURE — 1125F PR PAIN SEVERITY QUANTIFIED, PAIN PRESENT: ICD-10-PCS | Mod: CPTII,S$GLB,, | Performed by: FAMILY MEDICINE

## 2022-07-06 PROCEDURE — 99214 OFFICE O/P EST MOD 30 MIN: CPT | Mod: 25,S$GLB,, | Performed by: FAMILY MEDICINE

## 2022-07-06 PROCEDURE — 1159F MED LIST DOCD IN RCRD: CPT | Mod: CPTII,S$GLB,, | Performed by: FAMILY MEDICINE

## 2022-07-06 PROCEDURE — 3079F DIAST BP 80-89 MM HG: CPT | Mod: CPTII,S$GLB,, | Performed by: FAMILY MEDICINE

## 2022-07-06 PROCEDURE — 3044F HG A1C LEVEL LT 7.0%: CPT | Mod: CPTII,S$GLB,, | Performed by: FAMILY MEDICINE

## 2022-07-06 PROCEDURE — 3074F PR MOST RECENT SYSTOLIC BLOOD PRESSURE < 130 MM HG: ICD-10-PCS | Mod: CPTII,S$GLB,, | Performed by: FAMILY MEDICINE

## 2022-07-06 PROCEDURE — 99999 PR PBB SHADOW E&M-EST. PATIENT-LVL IV: CPT | Mod: PBBFAC,,, | Performed by: FAMILY MEDICINE

## 2022-07-06 PROCEDURE — 99214 PR OFFICE/OUTPT VISIT, EST, LEVL IV, 30-39 MIN: ICD-10-PCS | Mod: 25,S$GLB,, | Performed by: FAMILY MEDICINE

## 2022-07-06 PROCEDURE — 1159F PR MEDICATION LIST DOCUMENTED IN MEDICAL RECORD: ICD-10-PCS | Mod: CPTII,S$GLB,, | Performed by: FAMILY MEDICINE

## 2022-07-06 PROCEDURE — 1125F AMNT PAIN NOTED PAIN PRSNT: CPT | Mod: CPTII,S$GLB,, | Performed by: FAMILY MEDICINE

## 2022-07-06 PROCEDURE — 99999 PR PBB SHADOW E&M-EST. PATIENT-LVL IV: ICD-10-PCS | Mod: PBBFAC,,, | Performed by: FAMILY MEDICINE

## 2022-07-06 PROCEDURE — 1101F PR PT FALLS ASSESS DOC 0-1 FALLS W/OUT INJ PAST YR: ICD-10-PCS | Mod: CPTII,S$GLB,, | Performed by: FAMILY MEDICINE

## 2022-07-06 PROCEDURE — 3288F PR FALLS RISK ASSESSMENT DOCUMENTED: ICD-10-PCS | Mod: CPTII,S$GLB,, | Performed by: FAMILY MEDICINE

## 2022-07-06 PROCEDURE — 96372 THER/PROPH/DIAG INJ SC/IM: CPT | Mod: S$GLB,,, | Performed by: FAMILY MEDICINE

## 2022-07-06 PROCEDURE — 3008F BODY MASS INDEX DOCD: CPT | Mod: CPTII,S$GLB,, | Performed by: FAMILY MEDICINE

## 2022-07-06 RX ORDER — PREDNISONE 5 MG/1
TABLET ORAL
Qty: 20 TABLET | Refills: 0 | Status: SHIPPED | OUTPATIENT
Start: 2022-07-06 | End: 2022-08-17

## 2022-07-06 RX ORDER — CEFDINIR 300 MG/1
300 CAPSULE ORAL 2 TIMES DAILY
Qty: 20 CAPSULE | Refills: 0 | Status: SHIPPED | OUTPATIENT
Start: 2022-07-06 | End: 2022-07-16

## 2022-07-06 RX ORDER — LORATADINE 10 MG/1
10 TABLET ORAL DAILY
Qty: 30 TABLET | Refills: 5 | Status: SHIPPED | OUTPATIENT
Start: 2022-07-06 | End: 2022-10-07

## 2022-07-06 RX ORDER — TRIAMCINOLONE ACETONIDE 40 MG/ML
40 INJECTION, SUSPENSION INTRA-ARTICULAR; INTRAMUSCULAR ONCE
Status: COMPLETED | OUTPATIENT
Start: 2022-07-06 | End: 2022-07-06

## 2022-07-06 RX ADMIN — TRIAMCINOLONE ACETONIDE 40 MG: 40 INJECTION, SUSPENSION INTRA-ARTICULAR; INTRAMUSCULAR at 01:07

## 2022-07-06 NOTE — TELEPHONE ENCOUNTER
Called patient and appointment scheduled today with Dr. Meehan/  Patient explained having ear pain and sore throat

## 2022-07-06 NOTE — PROGRESS NOTES
Subjective:       Patient ID: Alexandria Estrada is a 72 y.o. female.    Chief Complaint: Sore Throat, Headache, and Otalgia    HPI: 73 yo WF -had COVID 2022--did OTC meds --fever and nasal congestion.  Got well  Sick x3 days worse last night--no fever--dried up sinuses in the maxillary area---+sore th--no cough  No pneumonia asthma TB--no smoke.  Nausea no vomiting diarrhea. Had covid vaccine      ROS:  Skin: no psoriasis, eczema, skin cancer skin lesion  no recent problems  HEENT: ,+ HA no ocular pain, blurred vision, diplopia, epistaxis, hoarseness no change in voice thyroid trouble   Lung: No pneumonia, asthma, Tb, wheezing, SOB, no smoking  Heart: No chest pain, ankle edema, palpitations, MI, elaine murmur, +hypertension blood pressure ,no hyperlipidemia--no stent bypass arrhythmia--positive peripheral vascular disease see history of present illness  Abdomen:  +GERD with certain foodsOK with medication  No nausea, vomiting,  diarrhea, constipation, ulcers, hepatitis, gallbladder disease, melena, hematochezia, hematemesis  :  Has overactive bladder + hx UTI OK now    history renal stone 20 years ago-on oxybutynin for bladder spasm  GYN last PAP 9-10 yrs ago--just had mammogram 2021  MS: no fractures, + O/A--needs hands severe scoliosis lumbar spine, right heel --nocturnal leg cramps--as pain in the left lower leg lateral aspect and left heel  Neuro:  No dizziness,no  LOC, seizures   No diabetes, no anemia, + anxiety, + depression--pandemic  --family issue -3 extra people in house -father   yo -nephew, girlfriend and son 17 work in dining room    , 2 daughters, work retired ,  lives with    Objective:   Physical Exam:  General: Well nourished, well developed, no acute distress +thin  Skin: No lesions  HEENT: Eyes PERRLA, EOM intact, nose clear  throat nonerythematous ears clear fluid  NECK: Supple, no bruits, No JVD, no nodes  Lungs: Clear, no rales, rhonchi, wheezing    Heart: Regular rate and rhythm, no murmurs, gallops, or rubs  Abdomen: flat, bowel sounds positive, no tenderness, or organomegaly some pressure in suprapubic area and groin area  MS: Severe scoliosis of the lumbar spine and thoracic spine, pain right heel mainly at the base of the heel and Achilles area more with weight-bearing then with palpation or flexion extension or inversion or eversion of the foot more with weight-bearing  Neuro: Alert, CN intact, oriented X 3  Extremities: No cyanosis, clubbing, or edema         Assessment:       1. Acute non-recurrent maxillary sinusitis    2. Peripheral vascular disease    3. HTN (hypertension), benign    4. History of nephrolithiasis    5. Borderline diabetes    6. Other idiopathic scoliosis, thoracolumbar region        Plan:       Acute non-recurrent maxillary sinusitis    Peripheral vascular disease    HTN (hypertension), benign    History of nephrolithiasis    Borderline diabetes    Other idiopathic scoliosis, thoracolumbar region    Other orders  -     triamcinolone acetonide injection 40 mg  -     cefdinir (OMNICEF) 300 MG capsule; Take 1 capsule (300 mg total) by mouth 2 (two) times daily. for 10 days  Dispense: 20 capsule; Refill: 0  -     predniSONE (DELTASONE) 5 MG tablet; 4 po qd x 2, 3 po qd x2, 2 po qd x2, 1 po qd x2  Dispense: 20 tablet; Refill: 0  -     loratadine (CLARITIN) 10 mg tablet; Take 1 tablet (10 mg total) by mouth once daily.  Dispense: 30 tablet; Refill: 5        Main Reason for Visit  Upper respiratory infection with maxillary sinusitis--Kenalog/Omnicef/prednisone taper/Claritin  Other medical issues  Left foot and ankle pain neuropathy--patient needs to increase gabapentin from wants to twice a day--told can increase to t.i.d. if needed--also comes in the 600 and 800 mg tablets.  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 diclofenac 50 mg b.i.d.   Other medical issues   Hypertension blood pressure was 122/88--now amlodipine 2.5 qd-- low Na diet--do daily blood pressure checks 1 blood pressure to be 139/89 or less than greater than 90/60   History of GERD on omeprazole doing well  History osteoporosis on Fosamax  History of allergies on Flonase  History overactive bladder on Ditropan  Severe scoliosis/restless leg syndrome/history nephrolithiasis  Health maintenance COVID vaccine  Lab done in February no new labs told at least August

## 2022-07-06 NOTE — TELEPHONE ENCOUNTER
----- Message from Nayely Cramer sent at 7/6/2022  8:16 AM CDT -----  Regarding: [facility] appointment  Contact: [phone]  Calling  to schedule an appointment today for sore throat and ears. Please call and schedule.

## 2022-07-06 NOTE — PROGRESS NOTES
Verified pt ID using name and . NKDA. Administered Kenalog 40MG in Right VG per physician order using aseptic technique. Aspirated and no blood return noted. Pt tolerated well with no adverse reactions noted.

## 2022-08-17 ENCOUNTER — OFFICE VISIT (OUTPATIENT)
Dept: PRIMARY CARE CLINIC | Facility: CLINIC | Age: 72
End: 2022-08-17
Payer: MEDICARE

## 2022-08-17 VITALS
SYSTOLIC BLOOD PRESSURE: 130 MMHG | HEART RATE: 69 BPM | DIASTOLIC BLOOD PRESSURE: 76 MMHG | WEIGHT: 131.63 LBS | OXYGEN SATURATION: 95 % | RESPIRATION RATE: 19 BRPM | BODY MASS INDEX: 21.16 KG/M2 | HEIGHT: 66 IN

## 2022-08-17 DIAGNOSIS — S39.012D LUMBOSACRAL STRAIN, SUBSEQUENT ENCOUNTER: ICD-10-CM

## 2022-08-17 DIAGNOSIS — M41.45 NEUROMUSCULAR SCOLIOSIS OF THORACOLUMBAR REGION: ICD-10-CM

## 2022-08-17 DIAGNOSIS — I10 HTN (HYPERTENSION), BENIGN: Primary | ICD-10-CM

## 2022-08-17 DIAGNOSIS — M54.30 SCIATICA, UNSPECIFIED LATERALITY: ICD-10-CM

## 2022-08-17 DIAGNOSIS — I73.9 PERIPHERAL VASCULAR DISEASE: ICD-10-CM

## 2022-08-17 DIAGNOSIS — R73.03 BORDERLINE DIABETES: ICD-10-CM

## 2022-08-17 PROCEDURE — 1159F PR MEDICATION LIST DOCUMENTED IN MEDICAL RECORD: ICD-10-PCS | Mod: CPTII,S$GLB,, | Performed by: FAMILY MEDICINE

## 2022-08-17 PROCEDURE — 3044F HG A1C LEVEL LT 7.0%: CPT | Mod: CPTII,S$GLB,, | Performed by: FAMILY MEDICINE

## 2022-08-17 PROCEDURE — 3008F PR BODY MASS INDEX (BMI) DOCUMENTED: ICD-10-PCS | Mod: CPTII,S$GLB,, | Performed by: FAMILY MEDICINE

## 2022-08-17 PROCEDURE — 3078F DIAST BP <80 MM HG: CPT | Mod: CPTII,S$GLB,, | Performed by: FAMILY MEDICINE

## 2022-08-17 PROCEDURE — 99214 PR OFFICE/OUTPT VISIT, EST, LEVL IV, 30-39 MIN: ICD-10-PCS | Mod: S$GLB,,, | Performed by: FAMILY MEDICINE

## 2022-08-17 PROCEDURE — 3288F FALL RISK ASSESSMENT DOCD: CPT | Mod: CPTII,S$GLB,, | Performed by: FAMILY MEDICINE

## 2022-08-17 PROCEDURE — 1126F PR PAIN SEVERITY QUANTIFIED, NO PAIN PRESENT: ICD-10-PCS | Mod: CPTII,S$GLB,, | Performed by: FAMILY MEDICINE

## 2022-08-17 PROCEDURE — 1159F MED LIST DOCD IN RCRD: CPT | Mod: CPTII,S$GLB,, | Performed by: FAMILY MEDICINE

## 2022-08-17 PROCEDURE — 1101F PR PT FALLS ASSESS DOC 0-1 FALLS W/OUT INJ PAST YR: ICD-10-PCS | Mod: CPTII,S$GLB,, | Performed by: FAMILY MEDICINE

## 2022-08-17 PROCEDURE — 3075F PR MOST RECENT SYSTOLIC BLOOD PRESS GE 130-139MM HG: ICD-10-PCS | Mod: CPTII,S$GLB,, | Performed by: FAMILY MEDICINE

## 2022-08-17 PROCEDURE — 3008F BODY MASS INDEX DOCD: CPT | Mod: CPTII,S$GLB,, | Performed by: FAMILY MEDICINE

## 2022-08-17 PROCEDURE — 99214 OFFICE O/P EST MOD 30 MIN: CPT | Mod: S$GLB,,, | Performed by: FAMILY MEDICINE

## 2022-08-17 PROCEDURE — 1101F PT FALLS ASSESS-DOCD LE1/YR: CPT | Mod: CPTII,S$GLB,, | Performed by: FAMILY MEDICINE

## 2022-08-17 PROCEDURE — 3078F PR MOST RECENT DIASTOLIC BLOOD PRESSURE < 80 MM HG: ICD-10-PCS | Mod: CPTII,S$GLB,, | Performed by: FAMILY MEDICINE

## 2022-08-17 PROCEDURE — 3288F PR FALLS RISK ASSESSMENT DOCUMENTED: ICD-10-PCS | Mod: CPTII,S$GLB,, | Performed by: FAMILY MEDICINE

## 2022-08-17 PROCEDURE — 3044F PR MOST RECENT HEMOGLOBIN A1C LEVEL <7.0%: ICD-10-PCS | Mod: CPTII,S$GLB,, | Performed by: FAMILY MEDICINE

## 2022-08-17 PROCEDURE — 99999 PR PBB SHADOW E&M-EST. PATIENT-LVL III: CPT | Mod: PBBFAC,,, | Performed by: FAMILY MEDICINE

## 2022-08-17 PROCEDURE — 3075F SYST BP GE 130 - 139MM HG: CPT | Mod: CPTII,S$GLB,, | Performed by: FAMILY MEDICINE

## 2022-08-17 PROCEDURE — 1126F AMNT PAIN NOTED NONE PRSNT: CPT | Mod: CPTII,S$GLB,, | Performed by: FAMILY MEDICINE

## 2022-08-17 PROCEDURE — 99999 PR PBB SHADOW E&M-EST. PATIENT-LVL III: ICD-10-PCS | Mod: PBBFAC,,, | Performed by: FAMILY MEDICINE

## 2022-08-17 RX ORDER — OMEPRAZOLE 40 MG/1
40 CAPSULE, DELAYED RELEASE ORAL DAILY
Qty: 90 CAPSULE | Refills: 1 | Status: SHIPPED | OUTPATIENT
Start: 2022-08-17 | End: 2023-07-19

## 2022-08-17 RX ORDER — GABAPENTIN 300 MG/1
300 CAPSULE ORAL 3 TIMES DAILY
Qty: 270 CAPSULE | Refills: 1 | Status: SHIPPED | OUTPATIENT
Start: 2022-08-17 | End: 2023-07-10

## 2022-08-17 RX ORDER — OXYBUTYNIN CHLORIDE 10 MG/1
10 TABLET, EXTENDED RELEASE ORAL DAILY
Qty: 90 TABLET | Refills: 1 | Status: SHIPPED | OUTPATIENT
Start: 2022-08-17 | End: 2023-07-19

## 2022-08-17 RX ORDER — AMLODIPINE BESYLATE 2.5 MG/1
2.5 TABLET ORAL DAILY
Qty: 90 TABLET | Refills: 1 | Status: SHIPPED | OUTPATIENT
Start: 2022-08-17 | End: 2023-02-20 | Stop reason: SDUPTHER

## 2022-08-17 NOTE — PROGRESS NOTES
Subjective:       Patient ID: Alexandria Estrada is a 72 y.o. female.    Chief Complaint: Follow-up (3 month appt ) and Medication Refill    HPI:  A 72-year-old white female in for 3 month appointment and medication refill---eating okay--+BM-- and ambulating well.         [facility] lab was reviewed CBCs CMP lipids thyroid and hemoglobin A1c cholesterol 211 better appointment 80  1. One hundred but HDL excellent at 61 hemoglobin A1c 5.7 minimal increase in glucose 100       Office visit 2022 73 yo WF -had COVID 2022--did OTC meds --fever and nasal congestion.  Got well  Sick x3 days worse last night--no fever--dried up sinuses in the maxillary area---+sore th--no cough  No pneumonia asthma TB--no smoke.  Nausea no vomiting diarrhea. Had covid vaccine      ROS:  Skin: no psoriasis, eczema, skin cancer skin lesion  no recent problems  HEENT: no  HA no ocular pain, blurred vision, diplopia, epistaxis, hoarseness no change in voice thyroid trouble   Lung: No pneumonia, asthma, Tb, wheezing, SOB, no smoking  Heart: No chest pain, ankle edema, palpitations, MI, elaine murmur, +hypertension blood pressure ,no hyperlipidemia--no stent bypass arrhythmia--positive peripheral vascular disease see history of present illness  Abdomen:  +GERD with certain foodsOK with medication  No nausea, vomiting,  diarrhea, constipation, ulcers, hepatitis, gallbladder disease, melena, hematochezia, hematemesis  :  Has overactive bladder + hx UTI OK now    history renal stone 20 years ago-on oxybutynin for bladder spasm  GYN last PAP 9-10 yrs ago--just had mammogram 2021  MS: no fractures, + O/A--needs hands severe scoliosis lumbar spine, right heel --nocturnal leg cramps--as pain in the left lower leg lateral aspect and left heel  Neuro:  No dizziness,no  LOC, seizures   No diabetes, no anemia, + anxiety, + depression--pandemic  --family issue -1 extra people in house -father   yo     , 2 daughters, work  retired ,  lives with    Objective:   Physical Exam:  General: Well nourished, well developed, no acute distress +thin  Skin: No lesions  HEENT: Eyes PERRLA, EOM intact, nose clear  throat nonerythematous ears clear fluid  NECK: Supple, no bruits, No JVD, no nodes  Lungs: Clear, no rales, rhonchi, wheezing   Heart: Regular rate and rhythm, no murmurs, gallops, or rubs  Abdomen: flat, bowel sounds positive, no tenderness, or organomegaly some pressure in suprapubic area and groin area  MS: Severe scoliosis of the lumbar spine and thoracic spine, pain right heel mainly at the base of the heel and Achilles area more with weight-bearing then with palpation or flexion extension or inversion or eversion of the foot more with weight-bearing  Neuro: Alert, CN intact, oriented X 3  Extremities: No cyanosis, clubbing, or edema         Assessment:       1. HTN (hypertension), benign    2. Peripheral vascular disease    3. Neuromuscular scoliosis of thoracolumbar region    4. Borderline diabetes    5. Lumbosacral strain, subsequent encounter    6. Sciatica, unspecified laterality        Plan:       HTN (hypertension), benign  -     CBC Auto Differential; Future; Expected date: 02/17/2023  -     Comprehensive Metabolic Panel; Future; Expected date: 02/17/2023  -     Lipid Panel; Future; Expected date: 02/17/2023    Peripheral vascular disease    Neuromuscular scoliosis of thoracolumbar region    Borderline diabetes  -     Hemoglobin A1C; Future; Expected date: 02/17/2023    Lumbosacral strain, subsequent encounter    Sciatica, unspecified laterality    Other orders  -     amLODIPine (NORVASC) 2.5 MG tablet; Take 1 tablet (2.5 mg total) by mouth once daily.  Dispense: 90 tablet; Refill: 1  -     gabapentin (NEURONTIN) 300 MG capsule; Take 1 capsule (300 mg total) by mouth 3 (three) times daily.  Dispense: 270 capsule; Refill: 1  -     omeprazole (PRILOSEC) 40 MG capsule; Take 1 capsule (40 mg total) by mouth once daily.   Dispense: 90 capsule; Refill: 1  -     oxybutynin (DITROPAN-XL) 10 MG 24 hr tablet; Take 1 tablet (10 mg total) by mouth once daily.  Dispense: 90 tablet; Refill: 1        Main Reason for Visit  Post nasal drip--use Flonase and Claritin chest demonstrating an upper respiratory infection better but not well  Left foot and ankle pain neuropathy--patient needs to increase gabapentin from wants to twice a day--told can increase to t.i.d. if needed--also comes in the 600 and 800 mg tablets.  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 diclofenac 50 mg b.i.d.   Other medical issues   Hypertension blood pressure was 130/76--now amlodipine 2.5 qd-- low Na diet--do daily blood pressure checks 1 blood pressure to be 139/89 or less than greater than 90/60   History of GERD on omeprazole doing well  History osteoporosis on Fosamax  History of allergies on Flonase  History overactive bladder on Ditropan  Severe scoliosis/restless leg syndrome/history nephrolithiasis  Health maintenance COVID vaccine  Lab in 6 mo CBC cMP lipid see me 6 mo

## 2022-09-06 ENCOUNTER — TELEPHONE (OUTPATIENT)
Dept: PRIMARY CARE CLINIC | Facility: CLINIC | Age: 72
End: 2022-09-06
Payer: MEDICARE

## 2022-09-06 NOTE — TELEPHONE ENCOUNTER
----- Message from Bruno Garrett sent at 9/6/2022 11:28 AM CDT -----  Regarding: Needs to be Seen Today  Patient states she needs to be seen today. Says she injured her finger. Unsure of how the injury occurred but is requesting to be seen today if possible.      Contact: [phone]

## 2022-09-13 ENCOUNTER — OFFICE VISIT (OUTPATIENT)
Dept: PRIMARY CARE CLINIC | Facility: CLINIC | Age: 72
End: 2022-09-13
Payer: MEDICARE

## 2022-09-13 VITALS
OXYGEN SATURATION: 97 % | SYSTOLIC BLOOD PRESSURE: 128 MMHG | BODY MASS INDEX: 21.08 KG/M2 | RESPIRATION RATE: 18 BRPM | HEART RATE: 66 BPM | DIASTOLIC BLOOD PRESSURE: 82 MMHG | WEIGHT: 131.19 LBS | TEMPERATURE: 98 F | HEIGHT: 66 IN

## 2022-09-13 DIAGNOSIS — I10 HTN (HYPERTENSION), BENIGN: ICD-10-CM

## 2022-09-13 DIAGNOSIS — M79.644 PAIN OF FINGER OF RIGHT HAND: Primary | ICD-10-CM

## 2022-09-13 DIAGNOSIS — Z87.442 HISTORY OF NEPHROLITHIASIS: ICD-10-CM

## 2022-09-13 DIAGNOSIS — N32.81 OVERACTIVE BLADDER: ICD-10-CM

## 2022-09-13 PROCEDURE — 3008F PR BODY MASS INDEX (BMI) DOCUMENTED: ICD-10-PCS | Mod: CPTII,S$GLB,, | Performed by: FAMILY MEDICINE

## 2022-09-13 PROCEDURE — 3044F PR MOST RECENT HEMOGLOBIN A1C LEVEL <7.0%: ICD-10-PCS | Mod: CPTII,S$GLB,, | Performed by: FAMILY MEDICINE

## 2022-09-13 PROCEDURE — 1101F PT FALLS ASSESS-DOCD LE1/YR: CPT | Mod: CPTII,S$GLB,, | Performed by: FAMILY MEDICINE

## 2022-09-13 PROCEDURE — 3288F FALL RISK ASSESSMENT DOCD: CPT | Mod: CPTII,S$GLB,, | Performed by: FAMILY MEDICINE

## 2022-09-13 PROCEDURE — 3008F BODY MASS INDEX DOCD: CPT | Mod: CPTII,S$GLB,, | Performed by: FAMILY MEDICINE

## 2022-09-13 PROCEDURE — 3079F PR MOST RECENT DIASTOLIC BLOOD PRESSURE 80-89 MM HG: ICD-10-PCS | Mod: CPTII,S$GLB,, | Performed by: FAMILY MEDICINE

## 2022-09-13 PROCEDURE — 3044F HG A1C LEVEL LT 7.0%: CPT | Mod: CPTII,S$GLB,, | Performed by: FAMILY MEDICINE

## 2022-09-13 PROCEDURE — 3079F DIAST BP 80-89 MM HG: CPT | Mod: CPTII,S$GLB,, | Performed by: FAMILY MEDICINE

## 2022-09-13 PROCEDURE — 99999 PR PBB SHADOW E&M-EST. PATIENT-LVL III: CPT | Mod: PBBFAC,,, | Performed by: FAMILY MEDICINE

## 2022-09-13 PROCEDURE — 3074F SYST BP LT 130 MM HG: CPT | Mod: CPTII,S$GLB,, | Performed by: FAMILY MEDICINE

## 2022-09-13 PROCEDURE — 99214 OFFICE O/P EST MOD 30 MIN: CPT | Mod: S$GLB,,, | Performed by: FAMILY MEDICINE

## 2022-09-13 PROCEDURE — 1126F AMNT PAIN NOTED NONE PRSNT: CPT | Mod: CPTII,S$GLB,, | Performed by: FAMILY MEDICINE

## 2022-09-13 PROCEDURE — 3074F PR MOST RECENT SYSTOLIC BLOOD PRESSURE < 130 MM HG: ICD-10-PCS | Mod: CPTII,S$GLB,, | Performed by: FAMILY MEDICINE

## 2022-09-13 PROCEDURE — 1101F PR PT FALLS ASSESS DOC 0-1 FALLS W/OUT INJ PAST YR: ICD-10-PCS | Mod: CPTII,S$GLB,, | Performed by: FAMILY MEDICINE

## 2022-09-13 PROCEDURE — 99214 PR OFFICE/OUTPT VISIT, EST, LEVL IV, 30-39 MIN: ICD-10-PCS | Mod: S$GLB,,, | Performed by: FAMILY MEDICINE

## 2022-09-13 PROCEDURE — 1126F PR PAIN SEVERITY QUANTIFIED, NO PAIN PRESENT: ICD-10-PCS | Mod: CPTII,S$GLB,, | Performed by: FAMILY MEDICINE

## 2022-09-13 PROCEDURE — 3288F PR FALLS RISK ASSESSMENT DOCUMENTED: ICD-10-PCS | Mod: CPTII,S$GLB,, | Performed by: FAMILY MEDICINE

## 2022-09-13 PROCEDURE — 99999 PR PBB SHADOW E&M-EST. PATIENT-LVL III: ICD-10-PCS | Mod: PBBFAC,,, | Performed by: FAMILY MEDICINE

## 2022-09-13 RX ORDER — MELOXICAM 7.5 MG/1
TABLET ORAL
Qty: 60 TABLET | Refills: 5 | Status: SHIPPED | OUTPATIENT
Start: 2022-09-13 | End: 2023-07-19

## 2022-09-13 NOTE — PROGRESS NOTES
Subjective:       Patient ID: Alexandria Estrada is a 72 y.o. female.    Chief Complaint: Hand Pain    HPI:  A 72-year-old white female--pain right middle finger--2 weeks ago on Wednesday was bothering patient that Thursday and Friday was fine--no trauma--no excessive activity--this past week and patient had to put a splint on her 3rd digit --her even if it was hanging then removed the finger just a little bit--the hip tip of her finger on the floor and felt stars  Took ibuprofen.  Today fingers not hurting all---last Thursday was going to start car and has tried to turn the pain and the right 3rd digit.  Pain appears to occur about every 3 day.  No lupus rheumatoid.  Retired   Does alot with right hand--writing--cooking--cleaning--folding clothes  Pain started at the IP joint--seems to involve the PIP distal finger rather than proximal and does not involve the MCP or Sargent aspect of the hand           ROS:  Skin: no psoriasis, eczema, skin cancer skin lesion  no recent problems  HEENT: no  HA no ocular pain, blurred vision, diplopia, epistaxis, hoarseness no change in voice thyroid trouble   Lung: No pneumonia, asthma, Tb, wheezing, SOB, no smoking  Heart: No chest pain, ankle edema, palpitations, MI, elaine murmur, +hypertension blood pressure ,no hyperlipidemia--no stent bypass arrhythmia--positive peripheral vascular disease see history of present illness  Abdomen:  +GERD with certain foodsOK with medication  No nausea, vomiting,  diarrhea, constipation, ulcers, hepatitis, gallbladder disease, melena, hematochezia, hematemesis  :  Has overactive bladder + hx UTI OK now    history renal stone 20 years ago-on oxybutynin for bladder spasm  GYN last PAP 9-10 yrs ago--just had mammogram 01/28/2021  MS: no fractures, + O/A--needs hands severe scoliosis lumbar spine, right heel --nocturnal leg cramps--as pain in the left lower leg lateral aspect and left heel  Neuro:  No dizziness,no  LOC, seizures   No diabetes, no  anemia, + anxiety, + depression--pandemic  --family issue -1 extra people in house -father   yo     , 2 daughters, work retired ,  lives with    Objective:   Physical Exam:  General: Well nourished, well developed, no acute distress +thin  Skin: No lesions  HEENT: Eyes PERRLA, EOM intact, nose clear  throat nonerythematous ears clear fluid  NECK: Supple, no bruits, No JVD, no nodes  Lungs: Clear, no rales, rhonchi, wheezing   Heart: Regular rate and rhythm, no murmurs, gallops, or rubs  Abdomen: flat, bowel sounds positive, no tenderness, or organomegaly some pressure in suprapubic area and groin area  MS:  Right 3rd digit--no discolorations this time--no swelling--full range of motion muscle strength--good hand grasp--able to oppose thumb and 3rd digits without difficulty Severe scoliosis of the lumbar spine and thoracic spine, pain right heel mainly at the base of the heel and Achilles area more with weight-bearing then with palpation or flexion extension or inversion or eversion of the foot more with weight-bearing  Neuro: Alert, CN intact, oriented X 3  Extremities: No cyanosis, clubbing, or edema         Assessment:       1. Pain of finger of right hand    2. Overactive bladder    3. History of nephrolithiasis    4. HTN (hypertension), benign          Plan:       Pain of finger of right hand  -     X-Ray Finger 2 or More Views Right; Future; Expected date: 2022    Overactive bladder    History of nephrolithiasis    HTN (hypertension), benign    Other orders  -     meloxicam (MOBIC) 7.5 MG tablet; 1 po bid prn pain  Dispense: 60 tablet; Refill: 5        Main Reason for Visit  Right 3rd digit--history of some pain every 2-3 days x2 weeks--no pain at this time--no discoloration--no swelling--full range of motion muscle strength--will get x-ray of the finger--pain appears to be when it occurs from the PIP joint distally---Mobic 7.51 p.o. b.i.d.--could consider Kenalog and  prednisone--if recurs could consider sed rate and a rheumatoid factor RPR rule out lupus or rheumatoid the not likely no pain in the MCP or palmar aspect of the hand.  Other medical issues  Left foot and ankle pain neuropathy--patient needs to increase gabapentin from wants to twice a day--told can increase to t.i.d. if needed--also comes in the 600 and 800 mg tablets.  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 diclofenac 50 mg b.i.d   Hypertension blood pressure was 128/82-now amlodipine 2.5 qd-- low Na diet--do daily blood pressure checks 1 blood pressure to be 139/89 or less than greater than 90/60   History of GERD on omeprazole doing well  History osteoporosis on Fosamax  History of allergies on Flonase  History overactive bladder on Ditropan  Severe scoliosis/restless leg syndrome/history nephrolithiasis  Health maintenance flu shot  Lab now CBC cMP lipid

## 2022-09-14 ENCOUNTER — TELEPHONE (OUTPATIENT)
Dept: PRIMARY CARE CLINIC | Facility: CLINIC | Age: 72
End: 2022-09-14
Payer: MEDICARE

## 2022-09-14 NOTE — TELEPHONE ENCOUNTER
----- Message from Bita Stanford sent at 9/14/2022  9:42 AM CDT -----  Contact: [phone]  Patient is returning a phone call.  Who left a message for the patient: does not know  Does patient know what this is regarding:  xray results  Would you like a call back, or a response through your [facility] portal?:   Call back      Please call and advise.    Thank You

## 2022-09-27 ENCOUNTER — PATIENT MESSAGE (OUTPATIENT)
Dept: PRIMARY CARE CLINIC | Facility: CLINIC | Age: 72
End: 2022-09-27
Payer: MEDICARE

## 2022-10-10 PROBLEM — J01.00 ACUTE NON-RECURRENT MAXILLARY SINUSITIS: Status: RESOLVED | Noted: 2022-07-06 | Resolved: 2022-10-10

## 2022-10-13 LAB
LEFT EYE DM RETINOPATHY: NEGATIVE
RIGHT EYE DM RETINOPATHY: NEGATIVE

## 2022-10-19 ENCOUNTER — PATIENT OUTREACH (OUTPATIENT)
Dept: ADMINISTRATIVE | Facility: HOSPITAL | Age: 72
End: 2022-10-19
Payer: MEDICARE

## 2022-10-19 NOTE — PROGRESS NOTES
There are no preventive care reminders to display for this patient.  Chart review done. HM updated. Immunizations reviewed & updated. Care Everywhere updated.

## 2022-11-22 NOTE — TELEPHONE ENCOUNTER
Let patient know that Dr. Meehan has not reviewed urine results yet, but as soon as he does, someone will contact her   MD at bedside.

## 2022-12-13 ENCOUNTER — TELEPHONE (OUTPATIENT)
Dept: PRIMARY CARE CLINIC | Facility: CLINIC | Age: 72
End: 2022-12-13
Payer: MEDICARE

## 2022-12-13 ENCOUNTER — OFFICE VISIT (OUTPATIENT)
Dept: PRIMARY CARE CLINIC | Facility: CLINIC | Age: 72
End: 2022-12-13
Payer: MEDICARE

## 2022-12-13 DIAGNOSIS — I10 HTN (HYPERTENSION), BENIGN: ICD-10-CM

## 2022-12-13 DIAGNOSIS — M41.45 NEUROMUSCULAR SCOLIOSIS OF THORACOLUMBAR REGION: ICD-10-CM

## 2022-12-13 DIAGNOSIS — U07.1 COVID-19: Primary | ICD-10-CM

## 2022-12-13 DIAGNOSIS — N32.81 OVERACTIVE BLADDER: ICD-10-CM

## 2022-12-13 DIAGNOSIS — Z87.442 HISTORY OF NEPHROLITHIASIS: ICD-10-CM

## 2022-12-13 DIAGNOSIS — S39.012D LUMBOSACRAL STRAIN, SUBSEQUENT ENCOUNTER: ICD-10-CM

## 2022-12-13 DIAGNOSIS — I73.9 PERIPHERAL VASCULAR DISEASE: ICD-10-CM

## 2022-12-13 PROCEDURE — 99213 OFFICE O/P EST LOW 20 MIN: CPT | Mod: 95,,, | Performed by: FAMILY MEDICINE

## 2022-12-13 PROCEDURE — 3044F PR MOST RECENT HEMOGLOBIN A1C LEVEL <7.0%: ICD-10-PCS | Mod: CPTII,95,, | Performed by: FAMILY MEDICINE

## 2022-12-13 PROCEDURE — 99213 PR OFFICE/OUTPT VISIT, EST, LEVL III, 20-29 MIN: ICD-10-PCS | Mod: 95,,, | Performed by: FAMILY MEDICINE

## 2022-12-13 PROCEDURE — 3044F HG A1C LEVEL LT 7.0%: CPT | Mod: CPTII,95,, | Performed by: FAMILY MEDICINE

## 2022-12-13 RX ORDER — METHYLPREDNISOLONE 4 MG/1
TABLET ORAL
Qty: 1 EACH | Refills: 0 | Status: SHIPPED | OUTPATIENT
Start: 2022-12-13 | End: 2023-07-19

## 2022-12-13 RX ORDER — METHYLPREDNISOLONE 4 MG/1
TABLET ORAL
Qty: 1 EACH | Refills: 0 | Status: SHIPPED | OUTPATIENT
Start: 2022-12-13 | End: 2022-12-13

## 2022-12-13 RX ORDER — AZITHROMYCIN 250 MG/1
TABLET, FILM COATED ORAL
Qty: 6 TABLET | Refills: 0 | Status: SHIPPED | OUTPATIENT
Start: 2022-12-13 | End: 2022-12-17

## 2022-12-13 NOTE — TELEPHONE ENCOUNTER
Called patient and notified her that it is best to make an appointment to discuss symptoms and have medication sent in.  Patient verbalized understanding and appointment scheduled as an audio visit for this evening.

## 2022-12-13 NOTE — TELEPHONE ENCOUNTER
----- Message from Sourav Duke sent at 12/13/2022  9:08 AM CST -----  Contact: Alexandria [phone]  Pt is calling and states that she tested positive for covid on a home test. She wanted to know if she should come in or if something could be sent to the pharmacy for her. Please call back to further assist.

## 2022-12-13 NOTE — PROGRESS NOTES
Dictation #1  MRN:[id number]  CSN:[id number]   Subjective:       Patient ID: Alexandria Estrada is a 72 y.o. female.    Chief Complaint: No chief complaint on file.    Sick x2 days  HPI:  72-year-old white female--+ covid --sick x2 days--tested this morning positive for COVID--mild temperature--+stuffy nose--+sore throat--not a lot of cough  No pneumonia asthma TB--no smoke.  No nausea vomiting diarrhea.Had 1 st vaccines .            ROS:  Skin: no psoriasis, eczema, skin cancer skin lesion  no recent problems  HEENT: no  HA no ocular pain, blurred vision, diplopia, epistaxis, hoarseness no change in voice thyroid trouble   Lung: No pneumonia, asthma, Tb, wheezing, SOB, no smoking  Heart: No chest pain, ankle edema, palpitations, MI, elaine murmur, +hypertension blood pressure ,no hyperlipidemia--no stent bypass arrhythmia--positive peripheral vascular disease see history of present illness  Abdomen:  +GERD with certain foodsOK with medication  No nausea, vomiting,  diarrhea, constipation, ulcers, hepatitis, gallbladder disease, melena, hematochezia, hematemesis  :  Has overactive bladder + hx UTI OK now    history renal stone 20 years ago-on oxybutynin for bladder spasm  GYN last PAP 9-10 yrs ago--just had mammogram 2021  MS: no fractures, + O/A--needs hands severe scoliosis lumbar spine, right heel --nocturnal leg cramps--as pain in the left lower leg lateral aspect and left heel  Neuro:  No dizziness,no  LOC, seizures   No diabetes, no anemia, + anxiety, + depression--pandemic  --family issue -1 extra people in house -father   yo     , 2 daughters, work retired ,  lives with    Objective:   Physical Exam:  No physical exam was done this is an audio visit  General: Well nourished, well developed, no acute distress +thin  Skin: No lesions  HEENT: Eyes PERRLA, EOM intact, nose clear  throat nonerythematous ears clear fluid  NECK: Supple, no bruits, No JVD, no nodes  Lungs: Clear, no rales,  rhonchi, wheezing   Heart: Regular rate and rhythm, no murmurs, gallops, or rubs  Abdomen: flat, bowel sounds positive, no tenderness, or organomegaly some pressure in suprapubic area and groin area  MS:  Right 3rd digit--no discolorations this time--no swelling--full range of motion muscle strength--good hand grasp--able to oppose thumb and 3rd digits without difficulty Severe scoliosis of the lumbar spine and thoracic spine, pain right heel mainly at the base of the heel and Achilles area more with weight-bearing then with palpation or flexion extension or inversion or eversion of the foot more with weight-bearing  Neuro: Alert, CN intact, oriented X 3  Extremities: No cyanosis, clubbing, or edema         Assessment:       1. COVID-19    2. Neuromuscular scoliosis of thoracolumbar region    3. Peripheral vascular disease    4. HTN (hypertension), benign    5. History of nephrolithiasis    6. Overactive bladder    7. Lumbosacral strain, subsequent encounter            Plan:       COVID-19    Neuromuscular scoliosis of thoracolumbar region    Peripheral vascular disease    HTN (hypertension), benign    History of nephrolithiasis    Overactive bladder    Lumbosacral strain, subsequent encounter    Other orders  -     azithromycin (Z-LANNY) 250 MG tablet; 2 tabs by mouth day 1, then 1 tab by mouth daily x 4 days  Dispense: 6 tablet; Refill: 0  -     methylPREDNISolone (MEDROL DOSEPACK) 4 mg tablet; use as directed  Dispense: 1 each; Refill: 0            Main Reason for Visit  +COVID--sinusitis--pharyngitis--Zithromax/Medrol Dosepak--patient told to isolate for 5 days if well can wear a mask whenever outside of house for another 5 days--if still sick after 5 days has to isolate the full 10 days--patient told if gets short of breath should get a pulse ox--if drops to 92 go to emergency room for chest x-ray or CT scan of the chest if goes to 88 needs to be on oxygen  Other medical issues  Left foot and ankle pain  neuropathy--patient needs to increase gabapentin from wants to twice a day--told can increase to t.i.d. if needed--also comes in the 600 and 800 mg tablets.  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 diclofenac 50 mg b.i.d   Hypertension blood pressure was 128/82-now amlodipine 2.5 qd-- low Na diet--do daily blood pressure checks 1 blood pressure to be 139/89 or less than greater than 90/60   History of GERD on omeprazole doing well  History osteoporosis on Fosamax  History of allergies on Flonase  History overactive bladder on Ditropan  Severe scoliosis/restless leg syndrome/history nephrolithiasis  Health maintenance flu shot  Lab  CBC cMP lipid                         Established Patient - Audio Only Telehealth Visit     The patient location is:  =Home  The chief complaint leading to consultation is:  +COVID  Visit type: Virtual visit with audio only (telephone)  Total time spent with patient:  20 minutes       The reason for the audio only service rather than synchronous audio and video virtual visit was related to technical difficulties or patient preference/necessity.     Each patient to whom I provide medical services by telemedicine is:  (1) informed of the relationship between the physician and patient and the respective role of any other health care provider with respect to management of the patient; and (2) notified that they may decline to receive medical services by telemedicine and may withdraw from such care at any time. Patient verbally consented to receive this service via voice-only telephone call.       HPI:  See history of present illness above     Assessment and plan:  See plan above                        This service was not originating from a related E/M service provided within the previous 7 days nor will it  lead to an E/M service or procedure within the next 24 hours or my soonest available appointment.  Prevailing standard of care was able to be met in this audio-only visit.

## 2023-02-20 ENCOUNTER — OFFICE VISIT (OUTPATIENT)
Dept: PRIMARY CARE CLINIC | Facility: CLINIC | Age: 73
End: 2023-02-20
Payer: MEDICARE

## 2023-02-20 VITALS
DIASTOLIC BLOOD PRESSURE: 76 MMHG | BODY MASS INDEX: 21.41 KG/M2 | RESPIRATION RATE: 18 BRPM | HEIGHT: 66 IN | TEMPERATURE: 97 F | WEIGHT: 133.25 LBS | OXYGEN SATURATION: 97 % | HEART RATE: 71 BPM | SYSTOLIC BLOOD PRESSURE: 118 MMHG

## 2023-02-20 DIAGNOSIS — S62.92XD CLOSED FRACTURE OF LEFT HAND WITH ROUTINE HEALING: ICD-10-CM

## 2023-02-20 DIAGNOSIS — I73.9 PERIPHERAL VASCULAR DISEASE: Primary | ICD-10-CM

## 2023-02-20 DIAGNOSIS — K21.00 GASTROESOPHAGEAL REFLUX DISEASE WITH ESOPHAGITIS, UNSPECIFIED WHETHER HEMORRHAGE: ICD-10-CM

## 2023-02-20 DIAGNOSIS — N32.81 OVERACTIVE BLADDER: ICD-10-CM

## 2023-02-20 DIAGNOSIS — M41.9 SCOLIOSIS OF THORACOLUMBAR SPINE, UNSPECIFIED SCOLIOSIS TYPE: Chronic | ICD-10-CM

## 2023-02-20 PROCEDURE — 99214 OFFICE O/P EST MOD 30 MIN: CPT | Mod: S$GLB,,, | Performed by: FAMILY MEDICINE

## 2023-02-20 PROCEDURE — 3288F PR FALLS RISK ASSESSMENT DOCUMENTED: ICD-10-PCS | Mod: CPTII,S$GLB,, | Performed by: FAMILY MEDICINE

## 2023-02-20 PROCEDURE — 3288F FALL RISK ASSESSMENT DOCD: CPT | Mod: CPTII,S$GLB,, | Performed by: FAMILY MEDICINE

## 2023-02-20 PROCEDURE — 99214 PR OFFICE/OUTPT VISIT, EST, LEVL IV, 30-39 MIN: ICD-10-PCS | Mod: S$GLB,,, | Performed by: FAMILY MEDICINE

## 2023-02-20 PROCEDURE — 3008F PR BODY MASS INDEX (BMI) DOCUMENTED: ICD-10-PCS | Mod: CPTII,S$GLB,, | Performed by: FAMILY MEDICINE

## 2023-02-20 PROCEDURE — 3044F PR MOST RECENT HEMOGLOBIN A1C LEVEL <7.0%: ICD-10-PCS | Mod: CPTII,S$GLB,, | Performed by: FAMILY MEDICINE

## 2023-02-20 PROCEDURE — 3008F BODY MASS INDEX DOCD: CPT | Mod: CPTII,S$GLB,, | Performed by: FAMILY MEDICINE

## 2023-02-20 PROCEDURE — 3078F DIAST BP <80 MM HG: CPT | Mod: CPTII,S$GLB,, | Performed by: FAMILY MEDICINE

## 2023-02-20 PROCEDURE — 3074F PR MOST RECENT SYSTOLIC BLOOD PRESSURE < 130 MM HG: ICD-10-PCS | Mod: CPTII,S$GLB,, | Performed by: FAMILY MEDICINE

## 2023-02-20 PROCEDURE — 1101F PR PT FALLS ASSESS DOC 0-1 FALLS W/OUT INJ PAST YR: ICD-10-PCS | Mod: CPTII,S$GLB,, | Performed by: FAMILY MEDICINE

## 2023-02-20 PROCEDURE — 1159F PR MEDICATION LIST DOCUMENTED IN MEDICAL RECORD: ICD-10-PCS | Mod: CPTII,S$GLB,, | Performed by: FAMILY MEDICINE

## 2023-02-20 PROCEDURE — 99999 PR PBB SHADOW E&M-EST. PATIENT-LVL IV: ICD-10-PCS | Mod: PBBFAC,,, | Performed by: FAMILY MEDICINE

## 2023-02-20 PROCEDURE — 1101F PT FALLS ASSESS-DOCD LE1/YR: CPT | Mod: CPTII,S$GLB,, | Performed by: FAMILY MEDICINE

## 2023-02-20 PROCEDURE — 1159F MED LIST DOCD IN RCRD: CPT | Mod: CPTII,S$GLB,, | Performed by: FAMILY MEDICINE

## 2023-02-20 PROCEDURE — 3074F SYST BP LT 130 MM HG: CPT | Mod: CPTII,S$GLB,, | Performed by: FAMILY MEDICINE

## 2023-02-20 PROCEDURE — 1125F AMNT PAIN NOTED PAIN PRSNT: CPT | Mod: CPTII,S$GLB,, | Performed by: FAMILY MEDICINE

## 2023-02-20 PROCEDURE — 99999 PR PBB SHADOW E&M-EST. PATIENT-LVL IV: CPT | Mod: PBBFAC,,, | Performed by: FAMILY MEDICINE

## 2023-02-20 PROCEDURE — 3044F HG A1C LEVEL LT 7.0%: CPT | Mod: CPTII,S$GLB,, | Performed by: FAMILY MEDICINE

## 2023-02-20 PROCEDURE — 1125F PR PAIN SEVERITY QUANTIFIED, PAIN PRESENT: ICD-10-PCS | Mod: CPTII,S$GLB,, | Performed by: FAMILY MEDICINE

## 2023-02-20 PROCEDURE — 3078F PR MOST RECENT DIASTOLIC BLOOD PRESSURE < 80 MM HG: ICD-10-PCS | Mod: CPTII,S$GLB,, | Performed by: FAMILY MEDICINE

## 2023-02-20 RX ORDER — OXYCODONE AND ACETAMINOPHEN 7.5; 325 MG/1; MG/1
1 TABLET ORAL EVERY 8 HOURS PRN
COMMUNITY
Start: 2023-02-10 | End: 2023-08-31

## 2023-02-20 RX ORDER — MIRABEGRON 25 MG/1
25 TABLET, FILM COATED, EXTENDED RELEASE ORAL DAILY
Qty: 90 TABLET | Refills: 3 | Status: SHIPPED | OUTPATIENT
Start: 2023-02-20 | End: 2023-07-19

## 2023-02-20 RX ORDER — IBUPROFEN 600 MG/1
600 TABLET ORAL
COMMUNITY
Start: 2023-02-15

## 2023-02-20 RX ORDER — AMLODIPINE BESYLATE 2.5 MG/1
2.5 TABLET ORAL DAILY
Qty: 90 TABLET | Refills: 3 | Status: SHIPPED | OUTPATIENT
Start: 2023-02-20 | End: 2023-05-08

## 2023-02-20 RX ORDER — AMLODIPINE BESYLATE 2.5 MG/1
2.5 TABLET ORAL DAILY
Qty: 90 TABLET | Refills: 1 | Status: SHIPPED | OUTPATIENT
Start: 2023-02-20 | End: 2023-02-20

## 2023-02-20 NOTE — PROGRESS NOTES
Dictation #1  MRN:[id number]  CSN:[id number]   Subjective:       Patient ID: Alexandria Estrada is a 73 y.o. female.    Chief Complaint: Follow-up (6 month )    Sick x2 days  HPI:  72-year-old white female--+in for 6 mo checkup.  Eating well--+BM--ambulating well  Hemoglobin A1c minimal elevation 5.7--glucose was 106 excellent--rest of lab CBCs CMP lipids thyroid all basically normal           ROS:  Skin: no psoriasis, eczema, skin cancer skin lesion  no recent problems  HEENT: no  HA no ocular pain, blurred vision, diplopia, epistaxis, hoarseness no change in voice thyroid trouble   Lung: No pneumonia, asthma, Tb, wheezing, SOB, no smoking  Heart: No chest pain, ankle edema, palpitations, MI, elaine murmur, +hypertension blood pressure ,no hyperlipidemia--no stent bypass arrhythmia--positive peripheral vascular disease see history of present illness  Abdomen:  +GERD with certain foodsOK with medication has been able decrease amount of medication No nausea, vomiting,  diarrhea, constipation, ulcers, hepatitis, gallbladder disease, melena, hematochezia, hematemesis  :  Has overactive bladder + hx UTI OK now    history renal stone 20 years ago-on oxybutynin for bladder spasm  GYN last PAP 9-10 yrs ago--just had mammogram 2021  MS: no fractures, + O/A--needs hands severe scoliosis lumbar spine, right heel --nocturnal leg cramps--as pain in the left lower leg lateral aspect and left heel  Neuro:  No dizziness,no  LOC, seizures   No diabetes, no anemia, + anxiety, + depression--pandemic  --family issue -1 extra people in house -father   yo     , 2 daughters, work retired ,  lives with    Objective:   Physical Exam:   General: Well nourished, well developed, no acute distress +thin  Skin: No lesions  HEENT: Eyes PERRLA, EOM intact, nose clear  throat nonerythematous ears clear fluid  NECK: Supple, no bruits, No JVD, no nodes  Lungs: Clear, no rales, rhonchi, wheezing   Heart: Regular rate and  rhythm, no murmurs, gallops, or rubs  Abdomen: flat, bowel sounds positive, no tenderness, or organomegaly some pressure in suprapubic area and groin area  MS:  Pt was pulling shelf --uneven floor--socks on slipped -slipped tried to catch herself and ended up fracturing a bone either in her wrist or her metacarpal went to urgent care x-ray results not in computer  Neuro: Alert, CN intact, oriented X 3  Extremities: No cyanosis, clubbing, or edema         Assessment:       1. Peripheral vascular disease    2. Overactive bladder    3. Gastroesophageal reflux disease with esophagitis, unspecified whether hemorrhage    4. Scoliosis of thoracolumbar spine, unspecified scoliosis type    5. Closed fracture of left hand with routine healing              Plan:       Peripheral vascular disease    Overactive bladder    Gastroesophageal reflux disease with esophagitis, unspecified whether hemorrhage    Scoliosis of thoracolumbar spine, unspecified scoliosis type    Closed fracture of left hand with routine healing    Other orders  -     Discontinue: amLODIPine (NORVASC) 2.5 MG tablet; Take 1 tablet (2.5 mg total) by mouth once daily.  Dispense: 90 tablet; Refill: 1  -     amLODIPine (NORVASC) 2.5 MG tablet; Take 1 tablet (2.5 mg total) by mouth once daily.  Dispense: 90 tablet; Refill: 3  -     mirabegron (MYRBETRIQ) 25 mg Tb24 ER tablet; Take 1 tablet (25 mg total) by mouth once daily.  Dispense: 90 tablet; Refill: 3              Main Reason for Visit  Hx fx wrist or hand pt not sure--saw Dr Vigil Hand surgeon --wearing wrist splint told needed no surgery--??8 week s=or more .  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 diclofenac 50 mg b.i.d   Hypertension blood pressure was 118/76-now amlodipine 2.5 qd-- low Na diet--do daily blood pressure checks 1 blood pressure to be 139/89 or less  than greater than 90/60   History of GERD on omeprazole doing well  History osteoporosis on Fosamax  History of allergies on Flonase  Hx fx hand or wrist due fall --has splint for 6-8 wks seeing Dr Musa rose surgeon  History overactive bladder on Ditropan switch to myrbetriq   Severe scoliosis/restless leg syndrome/history nephrolithiasis  Health maintenance flu shot  Lab  CBC cMP lipid in one year     Health maintenance

## 2023-04-13 ENCOUNTER — PES CALL (OUTPATIENT)
Dept: ADMINISTRATIVE | Facility: CLINIC | Age: 73
End: 2023-04-13
Payer: MEDICARE

## 2023-05-08 RX ORDER — AMLODIPINE BESYLATE 2.5 MG/1
TABLET ORAL
Qty: 90 TABLET | Refills: 3 | Status: SHIPPED | OUTPATIENT
Start: 2023-05-08 | End: 2024-01-22 | Stop reason: SDUPTHER

## 2023-05-08 NOTE — TELEPHONE ENCOUNTER
No care due was identified.  [facility] Embedded Care Due Messages. Reference number: [id number].   5/08/2023 12:39:59 AM CDT

## 2023-07-08 NOTE — TELEPHONE ENCOUNTER
No care due was identified.  [facility] Embedded Care Due Messages. Reference number: [id number].   7/08/2023 7:08:20 AM CDT

## 2023-07-10 RX ORDER — GABAPENTIN 300 MG/1
CAPSULE ORAL
Qty: 270 CAPSULE | Refills: 1 | Status: SHIPPED | OUTPATIENT
Start: 2023-07-10 | End: 2024-01-22 | Stop reason: SDUPTHER

## 2023-07-19 ENCOUNTER — OFFICE VISIT (OUTPATIENT)
Dept: PRIMARY CARE CLINIC | Facility: CLINIC | Age: 73
End: 2023-07-19
Payer: MEDICARE

## 2023-07-19 ENCOUNTER — TELEPHONE (OUTPATIENT)
Dept: PRIMARY CARE CLINIC | Facility: CLINIC | Age: 73
End: 2023-07-19
Payer: MEDICARE

## 2023-07-19 ENCOUNTER — PATIENT MESSAGE (OUTPATIENT)
Dept: PRIMARY CARE CLINIC | Facility: CLINIC | Age: 73
End: 2023-07-19
Payer: MEDICARE

## 2023-07-19 VITALS
RESPIRATION RATE: 18 BRPM | HEIGHT: 66 IN | OXYGEN SATURATION: 97 % | WEIGHT: 132.38 LBS | SYSTOLIC BLOOD PRESSURE: 126 MMHG | DIASTOLIC BLOOD PRESSURE: 80 MMHG | TEMPERATURE: 96 F | BODY MASS INDEX: 21.28 KG/M2 | HEART RATE: 71 BPM

## 2023-07-19 DIAGNOSIS — M41.25 OTHER IDIOPATHIC SCOLIOSIS, THORACOLUMBAR REGION: Chronic | ICD-10-CM

## 2023-07-19 DIAGNOSIS — N32.81 OVERACTIVE BLADDER: ICD-10-CM

## 2023-07-19 DIAGNOSIS — Z12.31 ENCOUNTER FOR SCREENING MAMMOGRAM FOR MALIGNANT NEOPLASM OF BREAST: ICD-10-CM

## 2023-07-19 DIAGNOSIS — I10 HTN (HYPERTENSION), BENIGN: ICD-10-CM

## 2023-07-19 DIAGNOSIS — I73.9 PERIPHERAL VASCULAR DISEASE: ICD-10-CM

## 2023-07-19 DIAGNOSIS — R73.03 BORDERLINE DIABETES: ICD-10-CM

## 2023-07-19 DIAGNOSIS — M17.11 OSTEOARTHRITIS OF RIGHT KNEE, UNSPECIFIED OSTEOARTHRITIS TYPE: Primary | ICD-10-CM

## 2023-07-19 PROCEDURE — 1159F MED LIST DOCD IN RCRD: CPT | Mod: CPTII,S$GLB,, | Performed by: FAMILY MEDICINE

## 2023-07-19 PROCEDURE — 3074F PR MOST RECENT SYSTOLIC BLOOD PRESSURE < 130 MM HG: ICD-10-PCS | Mod: CPTII,S$GLB,, | Performed by: FAMILY MEDICINE

## 2023-07-19 PROCEDURE — 3044F HG A1C LEVEL LT 7.0%: CPT | Mod: CPTII,S$GLB,, | Performed by: FAMILY MEDICINE

## 2023-07-19 PROCEDURE — 99999 PR PBB SHADOW E&M-EST. PATIENT-LVL IV: CPT | Mod: PBBFAC,,, | Performed by: FAMILY MEDICINE

## 2023-07-19 PROCEDURE — 3074F SYST BP LT 130 MM HG: CPT | Mod: CPTII,S$GLB,, | Performed by: FAMILY MEDICINE

## 2023-07-19 PROCEDURE — 3008F PR BODY MASS INDEX (BMI) DOCUMENTED: ICD-10-PCS | Mod: CPTII,S$GLB,, | Performed by: FAMILY MEDICINE

## 2023-07-19 PROCEDURE — 1101F PR PT FALLS ASSESS DOC 0-1 FALLS W/OUT INJ PAST YR: ICD-10-PCS | Mod: CPTII,S$GLB,, | Performed by: FAMILY MEDICINE

## 2023-07-19 PROCEDURE — 99214 OFFICE O/P EST MOD 30 MIN: CPT | Mod: 25,S$GLB,, | Performed by: FAMILY MEDICINE

## 2023-07-19 PROCEDURE — 3288F PR FALLS RISK ASSESSMENT DOCUMENTED: ICD-10-PCS | Mod: CPTII,S$GLB,, | Performed by: FAMILY MEDICINE

## 2023-07-19 PROCEDURE — 3288F FALL RISK ASSESSMENT DOCD: CPT | Mod: CPTII,S$GLB,, | Performed by: FAMILY MEDICINE

## 2023-07-19 PROCEDURE — 1159F PR MEDICATION LIST DOCUMENTED IN MEDICAL RECORD: ICD-10-PCS | Mod: CPTII,S$GLB,, | Performed by: FAMILY MEDICINE

## 2023-07-19 PROCEDURE — 96372 THER/PROPH/DIAG INJ SC/IM: CPT | Mod: S$GLB,,, | Performed by: FAMILY MEDICINE

## 2023-07-19 PROCEDURE — 1126F AMNT PAIN NOTED NONE PRSNT: CPT | Mod: CPTII,S$GLB,, | Performed by: FAMILY MEDICINE

## 2023-07-19 PROCEDURE — 96372 PR INJECTION,THERAP/PROPH/DIAG2ST, IM OR SUBCUT: ICD-10-PCS | Mod: S$GLB,,, | Performed by: FAMILY MEDICINE

## 2023-07-19 PROCEDURE — 3044F PR MOST RECENT HEMOGLOBIN A1C LEVEL <7.0%: ICD-10-PCS | Mod: CPTII,S$GLB,, | Performed by: FAMILY MEDICINE

## 2023-07-19 PROCEDURE — 1126F PR PAIN SEVERITY QUANTIFIED, NO PAIN PRESENT: ICD-10-PCS | Mod: CPTII,S$GLB,, | Performed by: FAMILY MEDICINE

## 2023-07-19 PROCEDURE — 3079F PR MOST RECENT DIASTOLIC BLOOD PRESSURE 80-89 MM HG: ICD-10-PCS | Mod: CPTII,S$GLB,, | Performed by: FAMILY MEDICINE

## 2023-07-19 PROCEDURE — 3079F DIAST BP 80-89 MM HG: CPT | Mod: CPTII,S$GLB,, | Performed by: FAMILY MEDICINE

## 2023-07-19 PROCEDURE — 99999 PR PBB SHADOW E&M-EST. PATIENT-LVL IV: ICD-10-PCS | Mod: PBBFAC,,, | Performed by: FAMILY MEDICINE

## 2023-07-19 PROCEDURE — 1101F PT FALLS ASSESS-DOCD LE1/YR: CPT | Mod: CPTII,S$GLB,, | Performed by: FAMILY MEDICINE

## 2023-07-19 PROCEDURE — 3008F BODY MASS INDEX DOCD: CPT | Mod: CPTII,S$GLB,, | Performed by: FAMILY MEDICINE

## 2023-07-19 PROCEDURE — 99214 PR OFFICE/OUTPT VISIT, EST, LEVL IV, 30-39 MIN: ICD-10-PCS | Mod: 25,S$GLB,, | Performed by: FAMILY MEDICINE

## 2023-07-19 RX ORDER — PREDNISONE 20 MG/1
TABLET ORAL
Qty: 7 TABLET | Refills: 0 | Status: SHIPPED | OUTPATIENT
Start: 2023-07-19 | End: 2023-08-31

## 2023-07-19 RX ORDER — TRIAMCINOLONE ACETONIDE 40 MG/ML
40 INJECTION, SUSPENSION INTRA-ARTICULAR; INTRAMUSCULAR ONCE
Status: COMPLETED | OUTPATIENT
Start: 2023-07-19 | End: 2023-07-19

## 2023-07-19 RX ORDER — DICLOFENAC SODIUM 50 MG/1
TABLET, DELAYED RELEASE ORAL
Qty: 60 TABLET | Refills: 5 | Status: SHIPPED | OUTPATIENT
Start: 2023-07-19 | End: 2023-08-31

## 2023-07-19 RX ADMIN — TRIAMCINOLONE ACETONIDE 40 MG: 40 INJECTION, SUSPENSION INTRA-ARTICULAR; INTRAMUSCULAR at 09:07

## 2023-07-19 NOTE — TELEPHONE ENCOUNTER
Patient message in stating that she needed the medication sent to her local pharmacy. Instead of the mail order. Patient was informed that medication will be sent to local pharmacy. I called in [facility] and called in patient medication for Voltaren 50 mg 60 tablets, with 5 refills. I also called in Prednisone 20 mg, 7 Tablets with 0 refill. I spoke with pharmacist Chip who completed the medication request. I also called [facility] and cancelled the medication that was being sent out the patient via mail order.

## 2023-07-19 NOTE — PROGRESS NOTES
Dictation #1  MRN:[id number]  CSN:[id number]   Subjective:       Patient ID: Alexandria Estrada is a 73 y.o. female.    Chief Complaint: Knee Pain      HPI 72 yo WF in for  right knee pain -- patient had a fractured  left wrist so was not exercising.   Finished physical therapy on Friday knee started hurting on Sat.urday.  no trauma-- no excessive activity-- has arthritis in both knees.  If sits for long time and goes to stand extremely painful-- after walking a few steps gets better. No lupus rheumatoid gout             ROS:  Skin: no psoriasis, eczema, skin cancer skin lesion  no recent problems  HEENT: no  HA no ocular pain, blurred vision, diplopia, epistaxis, hoarseness no change in voice thyroid trouble   Lung: No pneumonia, asthma, Tb, wheezing, SOB, no smoking  Heart: No chest pain, ankle edema, palpitations, MI, elaine murmur, +hypertension blood pressure ,no hyperlipidemia--no stent bypass arrhythmia--positive peripheral vascular disease see history of present illness  Abdomen:  +GERD with certain foodsOK with medication has been able decrease amount of medication No nausea, vomiting,  diarrhea, constipation, ulcers, hepatitis, gallbladder disease, melena, hematochezia, hematemesis  :  Has overactive bladder + hx UTI OK now    history renal stone 20 years ago-on oxybutynin for bladder spasm  GYN last PAP 9-10 yrs ago--just had mammogram 01/28/2021  MS: no fractures, + O/A--needs hands severe scoliosis lumbar spine, right heel --nocturnal leg cramps--as pain in the left lower leg lateral aspect and left heel  Neuro:  No dizziness,no  LOC, seizures   No diabetes, no anemia,no  anxiety, no depression  , 2 daughters, work retired ,  lives with    Objective:   Physical Exam:   General: Well nourished, well developed, no acute distress +thin  Skin: No lesions  HEENT: Eyes PERRLA, EOM intact, nose clear  throat nonerythematous ears clear fluid  NECK: Supple, no bruits, No JVD, no nodes  Lungs: Clear, no  rales, rhonchi, wheezing   Heart: Regular rate and rhythm, no murmurs, gallops, or rubs  Abdomen: flat, bowel sounds positive, no tenderness, or organomegaly some pressure in suprapubic area and groin area  MS:   pain right  knee -- crepitus with flexion and extension of the knee-- no instability to the j-oint--- able to squat [facility] down able to arise without difficulty  Neuro: Alert, CN intact, oriented X 3  Extremities: No cyanosis, clubbing, or edema         Assessment:       1. Osteoarthritis of right knee, unspecified osteoarthritis type    2. Encounter for screening mammogram for malignant neoplasm of breast    3. HTN (hypertension), benign    4. Peripheral vascular disease    5. Other idiopathic scoliosis, thoracolumbar region    6. Borderline diabetes    7. Overactive bladder              Plan:       Osteoarthritis of right knee, unspecified osteoarthritis type  -     X-Ray Knee 3 View Right; Future; Expected date: 07/19/2023  -     Ambulatory referral/consult to Orthopedics; Future; Expected date: 07/26/2023    Encounter for screening mammogram for malignant neoplasm of breast  -     Mammo Digital Screening Bilat w/ Anjel; Future; Expected date: 07/19/2023    HTN (hypertension), benign    Peripheral vascular disease    Other idiopathic scoliosis, thoracolumbar region    Borderline diabetes    Overactive bladder    Other orders  -     triamcinolone acetonide injection 40 mg  -     predniSONE (DELTASONE) 20 MG tablet; Start Thursday a.m. prednisone 1 p.o. q.d. x7 days do not take with NSAIDs can take with Tylenol  Dispense: 7 tablet; Refill: 0  -     diclofenac (VOLTAREN) 50 MG EC tablet; After prednisone dose complete start Voltaren 50 mg 1 p.o. b.i.d. for knee pain no other NSAIDs can take with Tylenol  Dispense: 60 tablet; Refill: 5              Main Reason for Visit   Right knee pain-- history of osteoarthritis right knee actually both knees-- started Saturday 3 days ago-- pain especially after sitting  and goes to stand better after-- walking a few steps crepitus on flexion extension-- x-ray right knee-- Kenalog/ prednisone 20 mg 1 p.o. q.d. x7 days/ Voltaren 50 mg 1 p.o. b.i.d. 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 diclofenac 50 mg b.i.d   Hypertension blood pressure was 126/80now amlodipine 2.5 qd-- low Na diet--do daily blood pressure checks 1 blood pressure to be 139/89 or less than greater than 90/60    Other medical issues  History of GERD on omeprazole doing well  History osteoporosis on Fosamax  History of allergies on Flonase  Hx fx hand or wrist due fall --has splint for 6-8 wks seeing Dr Vigil hand surgeon  History overactive bladder on Ditropan switch to myrbetriq   Severe scoliosis/restless leg syndrome/history nephrolithiasis  Health maintenance flu shot  Lab  CBC cMP lipid HGbA1C in 6 mo     Health maintenance

## 2023-07-20 ENCOUNTER — PATIENT MESSAGE (OUTPATIENT)
Dept: PRIMARY CARE CLINIC | Facility: CLINIC | Age: 73
End: 2023-07-20
Payer: MEDICARE

## 2023-07-20 ENCOUNTER — PATIENT OUTREACH (OUTPATIENT)
Dept: ADMINISTRATIVE | Facility: HOSPITAL | Age: 73
End: 2023-07-20
Payer: MEDICARE

## 2023-07-20 NOTE — PROGRESS NOTES
Health Maintenance Due   Topic Date Due    Mammogram  04/12/2023     Immunizations - reviewed and updated   Care Everywhere - [facility]   Care Teams - updated   Outreach - [facility] Report reviewed. Patient has an annual wellness exam with PCP on 1/22/2024. Mammogram scheduled for 7/26/2023.  up to date.

## 2023-07-20 NOTE — TELEPHONE ENCOUNTER
Called patient in regards to message. Patient was informed that her medication will be available for . I called pharmacy and informed them that the medication was called in they verify.

## 2023-07-26 ENCOUNTER — HOSPITAL ENCOUNTER (OUTPATIENT)
Dept: RADIOLOGY | Facility: HOSPITAL | Age: 73
Discharge: HOME | End: 2023-07-26
Attending: FAMILY MEDICINE
Payer: MEDICARE

## 2023-07-26 DIAGNOSIS — Z12.31 ENCOUNTER FOR SCREENING MAMMOGRAM FOR MALIGNANT NEOPLASM OF BREAST: ICD-10-CM

## 2023-07-26 PROCEDURE — 77067 MAMMO DIGITAL SCREENING BILAT WITH TOMO: ICD-10-PCS | Mod: 26,,, | Performed by: RADIOLOGY

## 2023-07-26 PROCEDURE — 77067 SCR MAMMO BI INCL CAD: CPT | Mod: 26,,, | Performed by: RADIOLOGY

## 2023-07-26 PROCEDURE — 77063 MAMMO DIGITAL SCREENING BILAT WITH TOMO: ICD-10-PCS | Mod: 26,,, | Performed by: RADIOLOGY

## 2023-07-26 PROCEDURE — 77067 SCR MAMMO BI INCL CAD: CPT | Mod: TC

## 2023-07-26 PROCEDURE — 77063 BREAST TOMOSYNTHESIS BI: CPT | Mod: 26,,, | Performed by: RADIOLOGY

## 2023-08-31 ENCOUNTER — OFFICE VISIT (OUTPATIENT)
Dept: ORTHOPEDICS | Facility: CLINIC | Age: 73
End: 2023-08-31
Payer: MEDICARE

## 2023-08-31 VITALS
WEIGHT: 131.63 LBS | SYSTOLIC BLOOD PRESSURE: 119 MMHG | HEART RATE: 75 BPM | BODY MASS INDEX: 21.24 KG/M2 | DIASTOLIC BLOOD PRESSURE: 78 MMHG

## 2023-08-31 DIAGNOSIS — M17.11 PRIMARY OSTEOARTHRITIS OF RIGHT KNEE: ICD-10-CM

## 2023-08-31 PROCEDURE — 3288F FALL RISK ASSESSMENT DOCD: CPT | Mod: CPTII,S$GLB,,

## 2023-08-31 PROCEDURE — 99203 PR OFFICE/OUTPT VISIT, NEW, LEVL III, 30-44 MIN: ICD-10-PCS | Mod: S$GLB,,,

## 2023-08-31 PROCEDURE — 1159F PR MEDICATION LIST DOCUMENTED IN MEDICAL RECORD: ICD-10-PCS | Mod: CPTII,S$GLB,,

## 2023-08-31 PROCEDURE — 3008F BODY MASS INDEX DOCD: CPT | Mod: CPTII,S$GLB,,

## 2023-08-31 PROCEDURE — 3078F DIAST BP <80 MM HG: CPT | Mod: CPTII,S$GLB,,

## 2023-08-31 PROCEDURE — 3078F PR MOST RECENT DIASTOLIC BLOOD PRESSURE < 80 MM HG: ICD-10-PCS | Mod: CPTII,S$GLB,,

## 2023-08-31 PROCEDURE — 99999 PR PBB SHADOW E&M-EST. PATIENT-LVL III: CPT | Mod: PBBFAC,,,

## 2023-08-31 PROCEDURE — 3074F SYST BP LT 130 MM HG: CPT | Mod: CPTII,S$GLB,,

## 2023-08-31 PROCEDURE — 3288F PR FALLS RISK ASSESSMENT DOCUMENTED: ICD-10-PCS | Mod: CPTII,S$GLB,,

## 2023-08-31 PROCEDURE — 3074F PR MOST RECENT SYSTOLIC BLOOD PRESSURE < 130 MM HG: ICD-10-PCS | Mod: CPTII,S$GLB,,

## 2023-08-31 PROCEDURE — 99999 PR PBB SHADOW E&M-EST. PATIENT-LVL III: ICD-10-PCS | Mod: PBBFAC,,,

## 2023-08-31 PROCEDURE — 1126F AMNT PAIN NOTED NONE PRSNT: CPT | Mod: CPTII,S$GLB,,

## 2023-08-31 PROCEDURE — 1126F PR PAIN SEVERITY QUANTIFIED, NO PAIN PRESENT: ICD-10-PCS | Mod: CPTII,S$GLB,,

## 2023-08-31 PROCEDURE — 1159F MED LIST DOCD IN RCRD: CPT | Mod: CPTII,S$GLB,,

## 2023-08-31 PROCEDURE — 3008F PR BODY MASS INDEX (BMI) DOCUMENTED: ICD-10-PCS | Mod: CPTII,S$GLB,,

## 2023-08-31 PROCEDURE — 99203 OFFICE O/P NEW LOW 30 MIN: CPT | Mod: S$GLB,,,

## 2023-08-31 PROCEDURE — 1101F PR PT FALLS ASSESS DOC 0-1 FALLS W/OUT INJ PAST YR: ICD-10-PCS | Mod: CPTII,S$GLB,,

## 2023-08-31 PROCEDURE — 3044F HG A1C LEVEL LT 7.0%: CPT | Mod: CPTII,S$GLB,,

## 2023-08-31 PROCEDURE — 3044F PR MOST RECENT HEMOGLOBIN A1C LEVEL <7.0%: ICD-10-PCS | Mod: CPTII,S$GLB,,

## 2023-08-31 PROCEDURE — 1101F PT FALLS ASSESS-DOCD LE1/YR: CPT | Mod: CPTII,S$GLB,,

## 2023-08-31 NOTE — PROGRESS NOTES
Patient ID: Alexandria Estrada is a 73 y.o. female    Osteoarthritis of the Right Knee      History of Present Illness:    Alexandria Estrada presents to clinic for right knee pain. Patient denies known CARMINA. Reports around toward end of April she had insidious onset of right knee pain, which resolved with anti-inflammatories. She is here today for f/u, in no pain today. Also concerned about intermittent muscle cramps in her right foot, no injury, has been ongoing for years.  Pain is located over (points to) posterior knee. She reports that the pain is a 0 /10 sore pain toda. The pain is affecting ADLs and limiting desired level of activity. Denies numbness, tingling, radiation and inability to bear weight.    Occupation: retired    Ambulating: unassisted  Diabetic: no  Smoking: past  Hx of DVT/PE: no    PAST MEDICAL HISTORY:   Past Medical History:   Diagnosis Date    Hypertension     Wrist fracture 2023     PAST SURGICAL HISTORY:   Past Surgical History:   Procedure Laterality Date    BREAST BIOPSY Left     Yrs ago    BREAST CYST ASPIRATION Left     BREAST SURGERY      right breast lump     SECTION       FAMILY HISTORY:   Family History   Problem Relation Age of Onset    Parkinsonism Mother     Atrial fibrillation Father     Hypertension Father     Glaucoma Father     Arthritis Father     Cancer Sister         breast    Breast cancer Sister     Breast cancer Maternal Aunt     Breast cancer Maternal Grandmother     Rheum arthritis Neg Hx      SOCIAL HISTORY:   Social History     Occupational History    Occupation:  [employer]      Employer: [employer]   Tobacco Use    Smoking status: Former    Smokeless tobacco: Never   Substance and Sexual Activity    Alcohol use: Yes     Alcohol/week: 2.0 standard drinks of alcohol     Types: 1 Glasses of wine, 1 Cans of beer per week     Comment: rarely    Drug use: No    Sexual activity: Yes     Partners: Male     Birth control/protection: None         MEDICATIONS:   Current Outpatient Medications:     amLODIPine (NORVASC) 2.5 MG tablet, TAKE 1 TABLET DAILY, Disp: 90 tablet, Rfl: 3    gabapentin (NEURONTIN) 300 MG capsule, TAKE 1 CAPSULE THREE TIMES A DAY, Disp: 270 capsule, Rfl: 1    ibuprofen (ADVIL,MOTRIN) 600 MG tablet, Take 600 mg by mouth., Disp: , Rfl:   ALLERGIES:   Review of patient's allergies indicates:   Allergen Reactions    No known drug allergies          Physical Exam     Vitals:    08/31/23 1311   BP: 119/78   Pulse: 75     Alert and oriented to person, place and time. No acute distress. Well-groomed, not ill appearing. Pupils round and reactive, normal respiratory effort, no audible wheezing.     GENERAL:  A well-developed, well-nourished 73 y.o. female who is alert and       oriented in no acute distress.      Gait: She  walks with a normal gait.                   EXTREMITIES:  Examination of lower extremities reveals there is no visible mass or deformity.       Right knee:  ROM 5-120    Ligamentously stable to varus/valgus stress.    Anterior and posterior drawers negative.    No pain over pes bursa.    No warmth    No erythema    Effusion No    No joint line tenderness    Negative Patellofemoral grind/crepitus     The skin over both lower extremities is normal and unremarkable.  She has a  painless range of motion of the hips and ankles bilaterally.   Sensation is intact in both lower extremities.    There are no motor deficits in the lower extremities bilaterally.   Pedal pulses are palpable distally bilaterally.    She has no calf tenderness to palpation nor edema.    Imaging:     Right knee X-rays ordered/reviewed by me showing no evidence of fracture or dislocation. There is no obvious malalignment. No evidence of masses, lesions or foreign bodies. Mild DJD.     Assessment & Plan    Primary osteoarthritis of right knee  -     Ambulatory referral/consult to Orthopedics         I made the decision to obtain old records of the patient  including previous notes and imaging. New imaging was ordered today of the extremity or extremities evaluated. I independently reviewed and interpreted the radiographs and/or MRIs/CT scan today as well as prior imaging.    We discussed at length different treatment options including conservative vs surgical management. These include anti-inflammatories, acetaminophen, rest, ice, heat, formal physical therapy including strengthening and stretching exercises, home exercise programs, injections, dry needling, and finally surgical intervention.      Discussed with patient x-rays show mild degeneration of her right knee.  She is an overall healthy and very active 73-year-old.  She is in no pain today.  Recommended gliding exercises such as elliptical, swimming, cycling.  May continue activity as tolerated.  Regarding the muscle cramps, she is not on a statin.  Could possibly be vitamin deficiency related.  Did discuss possibility of EMG if no improvement and insoles in her shoes.  She will follow up with her primary care for this.  If her right knee pain worsens or becomes more persistent she will let the clinic know and we may trial injection.      Follow up: prn  X-rays next visit: none    All questions were answered and patient is agreeable to the above plan.

## 2023-09-07 ENCOUNTER — TELEPHONE (OUTPATIENT)
Dept: OTOLARYNGOLOGY | Facility: CLINIC | Age: 73
End: 2023-09-07
Payer: MEDICARE

## 2023-09-07 NOTE — TELEPHONE ENCOUNTER
----- Message from Gustabo Mccrary sent at 9/7/2023  3:42 PM CDT -----  Contact: pt  Pt requesting call back RE: would like to know if provider sells hearing aids      Confirmed contact below:  Contact Name:Alexandria Estrada  Phone Number: [phone]

## 2023-09-18 ENCOUNTER — PATIENT MESSAGE (OUTPATIENT)
Dept: PRIMARY CARE CLINIC | Facility: CLINIC | Age: 73
End: 2023-09-18
Payer: MEDICARE

## 2023-10-18 ENCOUNTER — PATIENT MESSAGE (OUTPATIENT)
Dept: CARDIOLOGY | Facility: CLINIC | Age: 73
End: 2023-10-18
Payer: MEDICARE

## 2023-10-23 NOTE — TELEPHONE ENCOUNTER
No care due was identified.  [facility] Embedded Care Due Messages. Reference number: [id number].   10/23/2023 6:31:23 PM CDT

## 2023-10-24 RX ORDER — GABAPENTIN 300 MG/1
CAPSULE ORAL
Refills: 0 | OUTPATIENT
Start: 2023-10-24

## 2023-10-24 NOTE — TELEPHONE ENCOUNTER
Called patient to inform her that she will need an appointment to get a refill on her medication Gabapentin. Patient did not answer. Voice mail was left.

## 2024-01-22 ENCOUNTER — OFFICE VISIT (OUTPATIENT)
Dept: PRIMARY CARE CLINIC | Facility: CLINIC | Age: 74
End: 2024-01-22
Payer: MEDICARE

## 2024-01-22 VITALS
RESPIRATION RATE: 18 BRPM | DIASTOLIC BLOOD PRESSURE: 80 MMHG | WEIGHT: 130.5 LBS | BODY MASS INDEX: 20.97 KG/M2 | HEIGHT: 66 IN | OXYGEN SATURATION: 96 % | HEART RATE: 74 BPM | SYSTOLIC BLOOD PRESSURE: 130 MMHG

## 2024-01-22 DIAGNOSIS — I10 HTN (HYPERTENSION), BENIGN: Primary | ICD-10-CM

## 2024-01-22 DIAGNOSIS — K21.00 GASTROESOPHAGEAL REFLUX DISEASE WITH ESOPHAGITIS WITHOUT HEMORRHAGE: ICD-10-CM

## 2024-01-22 DIAGNOSIS — G47.62 NOCTURNAL LEG CRAMPS: ICD-10-CM

## 2024-01-22 DIAGNOSIS — M41.45 NEUROMUSCULAR SCOLIOSIS OF THORACOLUMBAR REGION: ICD-10-CM

## 2024-01-22 DIAGNOSIS — I73.9 PERIPHERAL VASCULAR DISEASE: ICD-10-CM

## 2024-01-22 DIAGNOSIS — G62.9 NEUROPATHY: ICD-10-CM

## 2024-01-22 DIAGNOSIS — Z87.442 HISTORY OF NEPHROLITHIASIS: ICD-10-CM

## 2024-01-22 DIAGNOSIS — M41.25 OTHER IDIOPATHIC SCOLIOSIS, THORACOLUMBAR REGION: Chronic | ICD-10-CM

## 2024-01-22 PROBLEM — R73.03 BORDERLINE DIABETES: Status: RESOLVED | Noted: 2021-03-24 | Resolved: 2024-01-22

## 2024-01-22 PROBLEM — U07.1 COVID-19: Status: RESOLVED | Noted: 2022-12-13 | Resolved: 2024-01-22

## 2024-01-22 PROCEDURE — 3075F SYST BP GE 130 - 139MM HG: CPT | Mod: CPTII,S$GLB,, | Performed by: FAMILY MEDICINE

## 2024-01-22 PROCEDURE — 1101F PT FALLS ASSESS-DOCD LE1/YR: CPT | Mod: CPTII,S$GLB,, | Performed by: FAMILY MEDICINE

## 2024-01-22 PROCEDURE — 99214 OFFICE O/P EST MOD 30 MIN: CPT | Mod: S$GLB,,, | Performed by: FAMILY MEDICINE

## 2024-01-22 PROCEDURE — 1159F MED LIST DOCD IN RCRD: CPT | Mod: CPTII,S$GLB,, | Performed by: FAMILY MEDICINE

## 2024-01-22 PROCEDURE — 3079F DIAST BP 80-89 MM HG: CPT | Mod: CPTII,S$GLB,, | Performed by: FAMILY MEDICINE

## 2024-01-22 PROCEDURE — 1126F AMNT PAIN NOTED NONE PRSNT: CPT | Mod: CPTII,S$GLB,, | Performed by: FAMILY MEDICINE

## 2024-01-22 PROCEDURE — 99999 PR PBB SHADOW E&M-EST. PATIENT-LVL III: CPT | Mod: PBBFAC,,, | Performed by: FAMILY MEDICINE

## 2024-01-22 PROCEDURE — 3044F HG A1C LEVEL LT 7.0%: CPT | Mod: CPTII,S$GLB,, | Performed by: FAMILY MEDICINE

## 2024-01-22 PROCEDURE — 3288F FALL RISK ASSESSMENT DOCD: CPT | Mod: CPTII,S$GLB,, | Performed by: FAMILY MEDICINE

## 2024-01-22 PROCEDURE — 3008F BODY MASS INDEX DOCD: CPT | Mod: CPTII,S$GLB,, | Performed by: FAMILY MEDICINE

## 2024-01-22 RX ORDER — AMLODIPINE BESYLATE 2.5 MG/1
2.5 TABLET ORAL DAILY
Qty: 90 TABLET | Refills: 3 | Status: SHIPPED | OUTPATIENT
Start: 2024-01-22

## 2024-01-22 RX ORDER — GABAPENTIN 300 MG/1
300 CAPSULE ORAL 3 TIMES DAILY
Qty: 270 CAPSULE | Refills: 1 | Status: SHIPPED | OUTPATIENT
Start: 2024-01-22

## 2024-01-22 NOTE — PROGRESS NOTES
Dictation #1  MRN:[id number]  CSN:[id number]   Subjective:       Patient ID: Alexandria Estrada is a 73 y.o. female.    Chief Complaint: Annual Exam      HPI 72 yo WF in for annual physical--eating well--+BM--ambulating well  History of hypertension on amlodipine 2.5  History neuropathy on gabapentin--burns on the bottom of the left foot especially at night  Have reviewed CBC within normal limits CMP potassium 5.4 probably from hemolyzed blood lipids cholesterol 201  overall cholesterol okay hemoglobin A1c 5.6 normal             ROS:  Skin: no psoriasis, eczema, skin cancer skin lesion  no recent problems  HEENT: no  HA no ocular pain, blurred vision, diplopia, epistaxis, hoarseness no change in voice thyroid trouble   Lung: No pneumonia, asthma, Tb, wheezing, SOB, no smoking  Heart: No chest pain, ankle edema, palpitations, MI, elaine murmur, +hypertension blood pressure ,no hyperlipidemia--no stent bypass arrhythmia--positive peripheral vascular disease see history of present illness  Abdomen:  +GERD with certain foodsOK with medication has been able decrease amount of medication No nausea, vomiting,  diarrhea, constipation, ulcers, hepatitis, gallbladder disease, melena, hematochezia, hematemesis  :  Has overactive bladder + hx UTI OK now    history renal stone 20 years ago-on oxybutynin for bladder spasm  GYN -just had mammogram up to date   MS: no fractures, + O/A--needs hands severe scoliosis lumbar spine, right heel --nocturnal leg cramps--as pain in the left lower leg lateral aspect and left heel--neuropathy especially left plantar area see history of present illness  Neuro:  No dizziness,no  LOC, seizures   No diabetes, no anemia,no  anxiety, no depression  , 2 daughters, work retired ,  lives with    Objective:   Physical Exam:   General: Well nourished, well developed, no acute distress +thin  Skin: No lesions  HEENT: Eyes PERRLA, EOM intact, nose clear  throat nonerythematous ears clear  fluid  NECK: Supple, no bruits, No JVD, no nodes  Lungs: Clear, no rales, rhonchi, wheezing   Heart: Regular rate and rhythm, no murmurs, gallops, or rubs  Abdomen: flat, bowel sounds positive, no tenderness, or organomegaly some pressure in suprapubic area and groin area  MS:   pain right  knee -- crepitus with flexion and extension of the knee-- no instability to the j-oint--- able to squat shelter down able to arise without difficulty  Neuro: Alert, CN intact, oriented X 3  Extremities: No cyanosis, clubbing, or edema         Assessment:       1. HTN (hypertension), benign    2. History of nephrolithiasis    3. Gastroesophageal reflux disease with esophagitis without hemorrhage    4. Nocturnal leg cramps    5. Neuromuscular scoliosis of thoracolumbar region    6. Other idiopathic scoliosis, thoracolumbar region    7. Neuropathy                Plan:       HTN (hypertension), benign  -     CBC Auto Differential; Future; Expected date: 07/22/2024  -     Comprehensive Metabolic Panel; Future; Expected date: 07/22/2024  -     Lipid Panel; Future; Expected date: 07/22/2024    History of nephrolithiasis    Gastroesophageal reflux disease with esophagitis without hemorrhage    Nocturnal leg cramps    Neuromuscular scoliosis of thoracolumbar region    Other idiopathic scoliosis, thoracolumbar region    Neuropathy    Other orders  -     gabapentin (NEURONTIN) 300 MG capsule; Take 1 capsule (300 mg total) by mouth 3 (three) times daily.  Dispense: 270 capsule; Refill: 1  -     amLODIPine (NORVASC) 2.5 MG tablet; Take 1 tablet (2.5 mg total) by mouth once daily.  Dispense: 90 tablet; Refill: 3                Main Reason for Visit   Right knee pain-- history of osteoarthritis right knee actually both knees-- started Saturday 3 days ago-- pain especially after sitting and goes to stand better after-- walking a few steps crepitus on flexion extension-- x-ray right knee-- Kenalog/ prednisone 20 mg 1 p.o. q.d. x7 days/ Voltaren  50 mg 1 p.o. b.i.d. 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 diclofenac 50 mg b.i.d   Hypertension blood pressure was 126/80now amlodipine 2.5 qd-- low Na diet--do daily blood pressure checks 1 blood pressure to be 139/89 or less than greater than 90/60    Other medical issues  History of GERD on omeprazole doing well  History osteoporosis on Fosamax  History of allergies on Flonase  Hx fx hand or wrist due fall --has splint for 6-8 wks seeing Dr Vigil hand surgeon  History overactive bladder on Ditropan switch to myrbetriq   Severe scoliosis/restless leg syndrome/history nephrolithiasis  Health maintenance flu shot  Lab  CBC cMP lipid HGbA1C in 6 mo     Health maintenance

## 2024-02-29 DIAGNOSIS — Z78.0 MENOPAUSE: ICD-10-CM

## 2024-05-26 ENCOUNTER — PATIENT MESSAGE (OUTPATIENT)
Dept: PRIMARY CARE CLINIC | Facility: CLINIC | Age: 74
End: 2024-05-26
Payer: MEDICARE

## 2024-05-27 ENCOUNTER — TELEPHONE (OUTPATIENT)
Dept: PRIMARY CARE CLINIC | Facility: CLINIC | Age: 74
End: 2024-05-27
Payer: MEDICARE

## 2024-07-22 ENCOUNTER — OFFICE VISIT (OUTPATIENT)
Dept: PRIMARY CARE CLINIC | Facility: CLINIC | Age: 74
End: 2024-07-22
Payer: MEDICARE

## 2024-07-22 VITALS
RESPIRATION RATE: 18 BRPM | OXYGEN SATURATION: 94 % | SYSTOLIC BLOOD PRESSURE: 122 MMHG | BODY MASS INDEX: 20.6 KG/M2 | WEIGHT: 128.19 LBS | DIASTOLIC BLOOD PRESSURE: 84 MMHG | HEIGHT: 66 IN | HEART RATE: 65 BPM

## 2024-07-22 DIAGNOSIS — M41.9 SCOLIOSIS OF THORACOLUMBAR SPINE, UNSPECIFIED SCOLIOSIS TYPE: Chronic | ICD-10-CM

## 2024-07-22 DIAGNOSIS — I10 HTN (HYPERTENSION), BENIGN: Primary | ICD-10-CM

## 2024-07-22 DIAGNOSIS — G62.9 NEUROPATHY: ICD-10-CM

## 2024-07-22 DIAGNOSIS — K21.9 GASTROESOPHAGEAL REFLUX DISEASE WITHOUT ESOPHAGITIS: ICD-10-CM

## 2024-07-22 PROBLEM — K21.00 GASTROESOPHAGEAL REFLUX DISEASE WITH ESOPHAGITIS: Status: RESOLVED | Noted: 2019-10-31 | Resolved: 2024-07-22

## 2024-07-22 PROCEDURE — 3044F HG A1C LEVEL LT 7.0%: CPT | Mod: CPTII,S$GLB,, | Performed by: FAMILY MEDICINE

## 2024-07-22 PROCEDURE — 99999 PR PBB SHADOW E&M-EST. PATIENT-LVL III: CPT | Mod: PBBFAC,,, | Performed by: FAMILY MEDICINE

## 2024-07-22 PROCEDURE — 3288F FALL RISK ASSESSMENT DOCD: CPT | Mod: CPTII,S$GLB,, | Performed by: FAMILY MEDICINE

## 2024-07-22 PROCEDURE — 3079F DIAST BP 80-89 MM HG: CPT | Mod: CPTII,S$GLB,, | Performed by: FAMILY MEDICINE

## 2024-07-22 PROCEDURE — 3008F BODY MASS INDEX DOCD: CPT | Mod: CPTII,S$GLB,, | Performed by: FAMILY MEDICINE

## 2024-07-22 PROCEDURE — 1101F PT FALLS ASSESS-DOCD LE1/YR: CPT | Mod: CPTII,S$GLB,, | Performed by: FAMILY MEDICINE

## 2024-07-22 PROCEDURE — 99214 OFFICE O/P EST MOD 30 MIN: CPT | Mod: S$GLB,,, | Performed by: FAMILY MEDICINE

## 2024-07-22 PROCEDURE — 3074F SYST BP LT 130 MM HG: CPT | Mod: CPTII,S$GLB,, | Performed by: FAMILY MEDICINE

## 2024-07-22 PROCEDURE — 1126F AMNT PAIN NOTED NONE PRSNT: CPT | Mod: CPTII,S$GLB,, | Performed by: FAMILY MEDICINE

## 2024-07-22 PROCEDURE — 1159F MED LIST DOCD IN RCRD: CPT | Mod: CPTII,S$GLB,, | Performed by: FAMILY MEDICINE

## 2024-07-22 RX ORDER — OMEPRAZOLE 40 MG/1
40 CAPSULE, DELAYED RELEASE ORAL DAILY
Qty: 90 CAPSULE | Refills: 3 | Status: SHIPPED | OUTPATIENT
Start: 2024-07-22 | End: 2025-07-22

## 2024-07-22 RX ORDER — OXYBUTYNIN CHLORIDE 10 MG/1
10 TABLET, EXTENDED RELEASE ORAL DAILY
Qty: 90 TABLET | Refills: 3 | Status: SHIPPED | OUTPATIENT
Start: 2024-07-22 | End: 2025-07-22

## 2024-07-22 RX ORDER — MAGNESIUM 200 MG
1 TABLET ORAL DAILY
COMMUNITY

## 2024-07-22 NOTE — PROGRESS NOTES
Dictation #1  MRN:[id number]  CSN:[id number]   Subjective:       Patient ID: Alexandria Estrada is a 74 y.o. female.    Chief Complaint: 6 month check up       HPI 75 yo WF in for annual physical--eating well--+BM--ambulating well  History of hypertension on amlodipine 2.5--pressure 122/84  History neuropathy on gabapentin--doing well   History of osteoporosis on Fosamax--scoliosis   History of GERD needs omeprazole was off medication for 2 years starting to have problems         ROS:  Skin: no psoriasis, eczema, skin cancer skin lesion  no recent problems  HEENT: no  HA no ocular pain, blurred vision, diplopia, epistaxis, hoarseness no change in voice thyroid trouble   Lung: No pneumonia, asthma, Tb, wheezing, SOB, no smoking  Heart: No chest pain, ankle edema, palpitations, MI, elaine murmur, +hypertension blood pressure ,no hyperlipidemia--no stent bypass arrhythmia--positive peripheral vascular disease see history of present illness  Abdomen:  +GERD with certain foodsOK with medication has been able decrease amount of medication No nausea, vomiting,  diarrhea, constipation, ulcers, hepatitis, gallbladder disease, melena, hematochezia, hematemesis  :  Has overactive bladder + hx UTI OK now    history renal stone 20 years ago-on oxybutynin for bladder spasm  GYN -just had mammogram up to date   MS: no fractures, + O/A--needs hands severe scoliosis lumbar spine, right heel --nocturnal leg cramps--as pain in the left lower leg lateral aspect and left heel--neuropathy especially left plantar area see history of present illness  Neuro:  No dizziness,no  LOC, seizures   No diabetes, no anemia,no  anxiety, no depression  , 2 daughters, work retired ,  lives with    Objective:   Physical Exam:   General: Well nourished, well developed, no acute distress +thin  Skin: No lesions  HEENT: Eyes PERRLA, EOM intact, nose clear  throat nonerythematous ears clear fluid  NECK: Supple, no bruits, No JVD, no nodes  Lungs:  Clear, no rales, rhonchi, wheezing   Heart: Regular rate and rhythm, no murmurs, gallops, or rubs  Abdomen: flat, bowel sounds positive, no tenderness, or organomegaly some pressure in suprapubic area and groin area  MS:   pain right  knee -- crepitus with flexion and extension of the knee-- no instability to the j-oint--- able to squat senior living down able to arise without difficulty  Neuro: Alert, CN intact, oriented X 3  Extremities: No cyanosis, clubbing, or edema         Assessment:       1. HTN (hypertension), benign    2. Gastroesophageal reflux disease without esophagitis    3. Neuropathy    4. Scoliosis of thoracolumbar spine, unspecified scoliosis type                  Plan:       HTN (hypertension), benign    Gastroesophageal reflux disease without esophagitis    Neuropathy    Scoliosis of thoracolumbar spine, unspecified scoliosis type    Other orders  -     omeprazole (PRILOSEC) 40 MG capsule; Take 1 capsule (40 mg total) by mouth once daily.  Dispense: 90 capsule; Refill: 3  -     oxybutynin (DITROPAN-XL) 10 MG 24 hr tablet; Take 1 tablet (10 mg total) by mouth once daily.  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 diclofenac 50 mg b.i.d   Hypertension blood pressure was 122/84 now amlodipine 2.5 qd-- low Na diet--do daily blood pressure checks 1 blood pressure to be 139/89 or less than greater than 90/60   History of GERD on omeprazole --recent flare needs refills   History osteoporosis on Fosamax  History of allergies on Flonase  History overactive bladder on Ditropan    Severe scoliosis/restless leg syndrome/history nephrolithiasis  Health maintenance flu shot  Lab  CBC cMP lipid  now when fasting in 6 mo     Health maintenance

## 2024-08-05 RX ORDER — GABAPENTIN 300 MG/1
300 CAPSULE ORAL 3 TIMES DAILY
Qty: 270 CAPSULE | Refills: 0 | Status: SHIPPED | OUTPATIENT
Start: 2024-08-05

## 2024-08-11 ENCOUNTER — PATIENT MESSAGE (OUTPATIENT)
Dept: ADMINISTRATIVE | Facility: HOSPITAL | Age: 74
End: 2024-08-11
Payer: MEDICARE

## 2024-08-12 ENCOUNTER — PATIENT OUTREACH (OUTPATIENT)
Dept: ADMINISTRATIVE | Facility: HOSPITAL | Age: 74
End: 2024-08-12
Payer: MEDICARE

## 2024-08-12 NOTE — PROGRESS NOTES
Health Maintenance Due   Topic Date Due    RSV Vaccine (Age 60+ and Pregnant patients) (1 - 1-dose 60+ series) Never done    Mammogram  07/26/2024     Immunizations - reviewed and updated   Care Everywhere - triggered   Care Teams -   Outreach - Epic campaigns outreach done. Patient completed Osteoporosis screening on 4/24/2024.   Mammogram is scheduled for 8/21/2024. Patient verbalized understanding

## 2024-08-13 ENCOUNTER — TELEPHONE (OUTPATIENT)
Dept: PRIMARY CARE CLINIC | Facility: CLINIC | Age: 74
End: 2024-08-13
Payer: MEDICARE

## 2024-08-13 NOTE — TELEPHONE ENCOUNTER
----- Message from Shashi Talley sent at 8/13/2024  8:24 AM CDT -----  Contact: Pt. Portal  .Type:  Sooner Apoointment Request    Caller is requesting a sooner appointment.  Caller declined first available appointment listed below.  Caller will not accept being placed on the waitlist and is requesting a message be sent to doctor.  Name of Caller:pt  When is the first available appointment?  Symptoms: surgery clearance paperwork  Would the patient rather a call back or a response via [facility]?  Call back  Best Call Back Number: [phone]  Additional Information:  8/14/2024 - 8/28/2024  Need a form prepared by Dr. Meehan for upcoming cateract surgery.  It must be received by eye doctor before 9/6/24. Thank you.

## 2024-08-20 ENCOUNTER — OFFICE VISIT (OUTPATIENT)
Dept: PRIMARY CARE CLINIC | Facility: CLINIC | Age: 74
End: 2024-08-20
Payer: MEDICARE

## 2024-08-20 VITALS
DIASTOLIC BLOOD PRESSURE: 68 MMHG | BODY MASS INDEX: 20.39 KG/M2 | OXYGEN SATURATION: 92 % | WEIGHT: 126.88 LBS | RESPIRATION RATE: 18 BRPM | HEIGHT: 66 IN | HEART RATE: 82 BPM | SYSTOLIC BLOOD PRESSURE: 110 MMHG

## 2024-08-20 DIAGNOSIS — M41.9 SCOLIOSIS OF THORACOLUMBAR SPINE, UNSPECIFIED SCOLIOSIS TYPE: Chronic | ICD-10-CM

## 2024-08-20 DIAGNOSIS — K21.9 GASTROESOPHAGEAL REFLUX DISEASE WITHOUT ESOPHAGITIS: ICD-10-CM

## 2024-08-20 DIAGNOSIS — G47.62 NOCTURNAL LEG CRAMPS: ICD-10-CM

## 2024-08-20 DIAGNOSIS — G62.9 NEUROPATHY: ICD-10-CM

## 2024-08-20 DIAGNOSIS — I73.9 PERIPHERAL VASCULAR DISEASE: ICD-10-CM

## 2024-08-20 DIAGNOSIS — Z87.442 HISTORY OF NEPHROLITHIASIS: ICD-10-CM

## 2024-08-20 DIAGNOSIS — S39.012D LUMBOSACRAL STRAIN, SUBSEQUENT ENCOUNTER: ICD-10-CM

## 2024-08-20 DIAGNOSIS — H25.013 CORTICAL AGE-RELATED CATARACT OF BOTH EYES: Primary | ICD-10-CM

## 2024-08-20 DIAGNOSIS — I10 HTN (HYPERTENSION), BENIGN: ICD-10-CM

## 2024-08-20 DIAGNOSIS — N32.81 OVERACTIVE BLADDER: ICD-10-CM

## 2024-08-20 DIAGNOSIS — M54.31 SCIATICA OF RIGHT SIDE: ICD-10-CM

## 2024-08-20 PROBLEM — H25.9 AGE-RELATED CATARACT OF BOTH EYES: Status: ACTIVE | Noted: 2024-08-20

## 2024-08-20 PROCEDURE — 99214 OFFICE O/P EST MOD 30 MIN: CPT | Mod: S$GLB,,, | Performed by: FAMILY MEDICINE

## 2024-08-20 PROCEDURE — 99999 PR PBB SHADOW E&M-EST. PATIENT-LVL III: CPT | Mod: PBBFAC,,, | Performed by: FAMILY MEDICINE

## 2024-08-20 PROCEDURE — 3008F BODY MASS INDEX DOCD: CPT | Mod: CPTII,S$GLB,, | Performed by: FAMILY MEDICINE

## 2024-08-20 PROCEDURE — 1101F PT FALLS ASSESS-DOCD LE1/YR: CPT | Mod: CPTII,S$GLB,, | Performed by: FAMILY MEDICINE

## 2024-08-20 PROCEDURE — 3074F SYST BP LT 130 MM HG: CPT | Mod: CPTII,S$GLB,, | Performed by: FAMILY MEDICINE

## 2024-08-20 PROCEDURE — 3288F FALL RISK ASSESSMENT DOCD: CPT | Mod: CPTII,S$GLB,, | Performed by: FAMILY MEDICINE

## 2024-08-20 PROCEDURE — 1126F AMNT PAIN NOTED NONE PRSNT: CPT | Mod: CPTII,S$GLB,, | Performed by: FAMILY MEDICINE

## 2024-08-20 PROCEDURE — 3078F DIAST BP <80 MM HG: CPT | Mod: CPTII,S$GLB,, | Performed by: FAMILY MEDICINE

## 2024-08-20 PROCEDURE — 1159F MED LIST DOCD IN RCRD: CPT | Mod: CPTII,S$GLB,, | Performed by: FAMILY MEDICINE

## 2024-08-20 PROCEDURE — 3044F HG A1C LEVEL LT 7.0%: CPT | Mod: CPTII,S$GLB,, | Performed by: FAMILY MEDICINE

## 2024-08-20 NOTE — PROGRESS NOTES
Dictation #1  MRN:[id number]  CSN:[id number]   Subjective:       Patient ID: Alexandria Estrada is a 74 y.o. female.    Chief Complaint: Pre-op Exam      HPI 73 yo WF in for pre op clearance-- --left eye     PMH   Surgery - breast surgery lumpectomy/breast surgery cyst aspiration/breast surgery biopsy  Hosp--scoliosis/neuropathy/hypertension/peripheral vascular disease/overactive bladder/nephrolithiasis/GERD/hallux valgus/osteoarthritis/lumbosacral strain/sciatica/nocturnal leg cramps osteoporosis  Medication see sheet  No known allergies      Soc Hx --smoking-none--ETOH-social----2 children--retired--lives with       Family history--mother father-hypertension--father stroke--sister--breast cancer--father  of bladder cancer--mother Parkinson's         ROS:  Skin: no psoriasis, eczema, skin cancer skin lesion    HEENT: no  HA no ocular pain, blurred vision, diplopia, epistaxis, +hoarseness no change in voice thyroid trouble    Lung: No pneumonia, asthma, Tb, wheezing, SOB, no smoking  Heart: No chest pain, ankle edema, palpitations, MI, elaine murmur, +hypertension blood pressure ,no hyperlipidemia--no stent bypass arrhythmia--positive peripheral vascular disease   Abdomen:  +GERD with certain foodsOK with medication has been able decrease amount of medication No nausea, vomiting,  diarrhea, constipation, ulcers, hepatitis, gallbladder disease, melena, hematochezia, hematemesis  :  Has overactive bladder + hx UTI OK now    history renal stone 20 years ago-on oxybutynin for bladder spasm  GYN -LMP age 50 last mammogram getting tomorrow   MS: no fractures, + O/A--needs hands severe scoliosis lumbar spine, right heel --nocturnal leg cramps--as pain in the left lower leg lateral aspect and left heel--neuropathy especially left plantar area history osteoporosis  Neuro:  No dizziness,no  LOC, seizures   No diabetes, no anemia,no  anxiety, no depression  , 2 daughters, work retired ,   lives with    Objective:   Physical Exam:   General: Well nourished, well developed, no acute distress +thin  Skin: No lesions  HEENT: Eyes PERRLA, EOM intact, nose clear  throat nonerythematous ears clear fluid  NECK: Supple, no bruits, No JVD, no nodes  Lungs: Clear, no rales, rhonchi, wheezing   Heart: Regular rate and rhythm, no murmurs, gallops, or rubs  Abdomen: flat, bowel sounds positive, no tenderness, or organomegaly some pressure in suprapubic area and groin area  MS:   pain right  knee -- crepitus with flexion and extension of the knee-- no instability to the j-oint--- able to squat long term down able to arise without difficulty  Neuro: Alert, CN intact, oriented X 3  Extremities: No cyanosis, clubbing, or edema         Assessment:       1. Cortical age-related cataract of both eyes    2. Scoliosis of thoracolumbar spine, unspecified scoliosis type    3. HTN (hypertension), benign    4. Lumbosacral strain, subsequent encounter    5. Sciatica of right side    6. History of nephrolithiasis    7. Overactive bladder    8. Nocturnal leg cramps    9. Peripheral vascular disease    10. Neuropathy    11. Gastroesophageal reflux disease without esophagitis                    Plan:       Cortical age-related cataract of both eyes    Scoliosis of thoracolumbar spine, unspecified scoliosis type    HTN (hypertension), benign    Lumbosacral strain, subsequent encounter    Sciatica of right side    History of nephrolithiasis    Overactive bladder    Nocturnal leg cramps    Peripheral vascular disease    Neuropathy    Gastroesophageal reflux disease without esophagitis                    Main Reason for Visit  Bilateral cataracts--patient cleared for cataract surgery---CBCs CMP lipid has routine labs---had chest x-ray 2021 normal--EKG 2022 first-degree heart block otherwise normal--no need to repeat chest x-ray or EKG and labs are just being done routinely patient is medically cleared for eye surgery  Other  medical issues  Scoliosis --still back pain on diclofenac   Back pain--severe scoliosis thoracolumbar spine--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 diclofenac 50 mg b.i.d   Hypertension blood pressure was 110/68 now amlodipine 2.5 qd-- low Na diet--do daily blood pressure checks 1 blood pressure to be 139/89 or less than greater than 90/60   History of GERD on omeprazole --recent flare needs refills   History osteoporosis on Fosamax--hx GI issues so fosfamax stopped   History of allergies on Flonase  History overactive bladder on Ditropan    Severe scoliosis/restless leg syndrome/history nephrolithiasis  Lab  CBC cMP lipid  now when fasting      Health maintenance

## 2024-08-21 ENCOUNTER — HOSPITAL ENCOUNTER (OUTPATIENT)
Dept: RADIOLOGY | Facility: HOSPITAL | Age: 74
Discharge: HOME | End: 2024-08-21
Attending: FAMILY MEDICINE
Payer: MEDICARE

## 2024-08-21 DIAGNOSIS — Z12.31 ENCOUNTER FOR SCREENING MAMMOGRAM FOR BREAST CANCER: ICD-10-CM

## 2024-08-21 PROCEDURE — 77067 SCR MAMMO BI INCL CAD: CPT | Mod: TC

## 2024-09-03 ENCOUNTER — PATIENT MESSAGE (OUTPATIENT)
Dept: PEDIATRICS | Facility: CLINIC | Age: 74
End: 2024-09-03
Payer: MEDICARE

## 2024-10-29 RX ORDER — GABAPENTIN 300 MG/1
300 CAPSULE ORAL 3 TIMES DAILY
Qty: 270 CAPSULE | Refills: 0 | Status: SHIPPED | OUTPATIENT
Start: 2024-10-29

## 2024-11-13 ENCOUNTER — OFFICE VISIT (OUTPATIENT)
Dept: UROLOGY | Facility: CLINIC | Age: 74
End: 2024-11-13
Payer: MEDICARE

## 2024-11-13 ENCOUNTER — TELEPHONE (OUTPATIENT)
Dept: UROLOGY | Facility: CLINIC | Age: 74
End: 2024-11-13

## 2024-11-13 VITALS — BODY MASS INDEX: 20.41 KG/M2 | HEIGHT: 66 IN | WEIGHT: 127 LBS

## 2024-11-13 DIAGNOSIS — N39.0 RECURRENT UTI: ICD-10-CM

## 2024-11-13 DIAGNOSIS — R39.89 SENSATION OF PRESSURE IN BLADDER AREA: Primary | ICD-10-CM

## 2024-11-13 DIAGNOSIS — R31.0 GROSS HEMATURIA: Primary | ICD-10-CM

## 2024-11-13 DIAGNOSIS — N32.81 OVERACTIVE BLADDER: ICD-10-CM

## 2024-11-13 DIAGNOSIS — R31.0 GROSS HEMATURIA: ICD-10-CM

## 2024-11-13 DIAGNOSIS — R39.15 URINARY URGENCY: ICD-10-CM

## 2024-11-13 LAB
BACTERIA #/AREA URNS AUTO: ABNORMAL /HPF
MICROSCOPIC COMMENT: ABNORMAL
RBC #/AREA URNS AUTO: 2 /HPF (ref 0–4)
WBC #/AREA URNS AUTO: >100 /HPF (ref 0–5)
WBC CLUMPS UR QL AUTO: ABNORMAL

## 2024-11-13 PROCEDURE — 81001 URINALYSIS AUTO W/SCOPE: CPT | Performed by: NURSE PRACTITIONER

## 2024-11-13 PROCEDURE — 87086 URINE CULTURE/COLONY COUNT: CPT | Performed by: NURSE PRACTITIONER

## 2024-11-13 PROCEDURE — 87088 URINE BACTERIA CULTURE: CPT | Performed by: NURSE PRACTITIONER

## 2024-11-13 PROCEDURE — 87186 SC STD MICRODIL/AGAR DIL: CPT | Performed by: NURSE PRACTITIONER

## 2024-11-13 PROCEDURE — 99999 PR PBB SHADOW E&M-EST. PATIENT-LVL IV: CPT | Mod: PBBFAC,,, | Performed by: NURSE PRACTITIONER

## 2024-11-13 RX ORDER — ESTRADIOL 0.1 MG/G
1 CREAM VAGINAL
Qty: 42.5 G | Refills: 3 | Status: SHIPPED | OUTPATIENT
Start: 2024-11-14

## 2024-11-13 RX ORDER — MIRABEGRON 25 MG/1
25 TABLET, FILM COATED, EXTENDED RELEASE ORAL DAILY
Qty: 30 TABLET | Refills: 11 | Status: SHIPPED | OUTPATIENT
Start: 2024-11-13 | End: 2025-11-13

## 2024-11-13 NOTE — TELEPHONE ENCOUNTER
Spoke with pt. Pt wants to proceed with the procedure on 12/11. Informed pt that I will route the message to Dr. Reyes. Pt verbalized understanding.

## 2024-11-13 NOTE — H&P (VIEW-ONLY)
[facility]  Group: Tay/Esperanza/Jaimee/Eric  NPs: Chantal Perry/Zelda Jerez    Note today written by:Zelda Jerez  Date of Service: 2024      CHIEF COMPLAINT: UTI.    PRESENTING ILLNESS:    Alexandria Estrada is a 74 y.o. female who presents for UTI. This is her initial clinic visit    24  Pt had UTI 24 [place] [facility] [place]  Pt was also treated for UTI in August but only POCT UA was completed and pt was on AZO at the time    UTI symptoms include bladder pressure, urgency, voiding small amounts frequently  Pt does have UTI symptoms today. On AZO  Had 1 episode of gross hematuria 24  Denies flank pain, fever, chills, nausea or vomiting.  IO cath UA deferred today since on AZO  IO cath PVR 90 ml    Baseline voiding: pt was prescribed oxybutynin 10 mg for urinary frequency, urgency and urinary incontinence by PCP but she does not take everyday because she feels it causes her to retain fluid.  + mixed incontinence  Wears 1 pad per day    Denies constipation    Water intake: 4 bottles per day    Pt is post menopausal  Denies personal hx of breast or GYN cancer, MI or blood clots    History of kidney stones:  passed kidney stone ~10-15 year ago, no updated imaging on kidneys  History of recurrent UTI: in the past, maybe 1 UTI per year  Personal or family hx of  malignancy: + fam hx bladder CA, father. No renal cancer  History of  trauma: denies  Smoking history: former smoker, quit     Urine cultures:   Lab Results   Component Value Date    LABURIN ESCHERICHIA COLI 2013         REVIEW OF SYSTEMS: as stated above in hpi    PATIENT HISTORY:  Past Medical History:   Diagnosis Date    Hypertension     Wrist fracture 2023       Past Surgical History:   Procedure Laterality Date    BREAST BIOPSY Left     Yrs ago    BREAST CYST ASPIRATION Left     BREAST SURGERY      right breast lump     SECTION          Allergies:  No known drug allergies      PHYSICAL EXAMINATION:  Constitutional: She is oriented to person, place, and time. She appears well-developed and well-nourished.  She is in no apparent distress.  Abdominal:  She exhibits no distension.  There is no CVA tenderness.   Neurological: She is alert and oriented to person, place, and time.   Psych: Cooperative with normal affect.    Genitourinary:  Urethral meatus is normal  Consent verbally obtained. And in and out cath was performed under sterile conditions immediately after voiding. The PVR was 90 ml      Physical Exam      LABS:      Lab Results   Component Value Date    CREATININE 0.6 08/29/2024     Lab Results   Component Value Date    HGBA1C 5.6 01/17/2024         IMPRESSION:    Encounter Diagnoses   Name Primary?    Sensation of pressure in bladder area Yes    Urinary urgency     Gross hematuria     Overactive bladder     Recurrent UTI        PLAN:  -Catheterized urine sent for micro UA and culture  -Voided urine sent for cytology  -CT urogram with creatinine prior and cysto with MD for hematuria work up  Message sent to MDs regarding cysto. Staff to call and schedule    -Discussed OAB. Stop oxybutynin  Will try myrbetriq. Goal is to reduce frequency and incontinence and avoid pad usage  Discussed side effects and indications for myrbetriq. Prescription sent to the pharmacy. Pt verbalized understanding. 8/29/24 creatinine 0.6 / GFR>60  Conservative measures to control urgency and frequency including   1. Avoiding/minimizing bladder irritants (see below), especially in afternoon and evening hours  Discussed bladder irritants include coffee (even decaf), tea, alcohol, soda, spicy foods, acidic juices (orange, tomato), vinegar, and artificial sweeteners/sugary beverages.  2. timed voiding - empty on a schedule (approx ~2-3 hours) in spite of need to urinate, to get ahead of urge  3. dont postponing voiding - dont hold it on purpose   4. bowel regimen as  distended bowel has extrinsic compressive effect on bladder.   - any or all of the following in any combination, titrate to soft daily bowel movement without pushing or straining  - colace/stool softener capsule - once to twice daily  - miralax - 1 capful daily to start, can increase to 2x daily (or decrease to 1/2 cap daily)  - increase dietary fibery  - fiber supplements, such as metamucil  - prunes, prune juice  5. INCREASE water intake  6. Stop fluids 2 hours before bed, and urinate just before bed    -UTI Preventative DAILY supplements:  Recommend Buying a Cranberry Supplement that has 36mg PAC (only a few have this much) of cranberry - it is a very specific dose but many companies sell it.   Preferred: Utiva Cranberry Tablets (Utiva Cranberry PACs Supplement Pills $39.99 for 30 pills)- their particular tablet is the equivalent of 9 cranberry tablets that you buy over the counter. (phone 1-846.679.8849 or online https://www.[employer]/products/utiva-uti-control)  Uriexo Cranberry Tablets   [facility]  [employer] $30/month: https://[employer]/products/cranberex-urinary-health-support  If unable to afford- can use over the counter cranberry caplets but will need to take 1500mg daily (about 3 capsules, 3x a day)   Ok [facility] (ask pharmacist for help) or buy from [employer] (see above)  Probiotic with lactobacillus - take once a day. This helps populate the vagina with good bacteria instead of bad bacteria- you can buy this over the counter or buy from the company [employer]. Make sure to look for LACTOBACILLUS on the bottle.   D-mannose supplement (2000mg a day)  $20/30d supply  This helps keep the bad bacteria from sticking to your bladder wall. You can buy this over the counter or buy from [employer] by visiting [employer].       UTI prevention recommendations  Drink more water to dilute the bacteria that are replicating. This will also help you urinate  more often if you tend to hold your bladder.   Timed voiding (which means- Urinate every 2 hours during the day) to rid the bladder of bacteria before they multiply and start to stick to your bladder walls and cause more symptoms and problems  Avoid pads if possible or wear thinner pads and change them more often  Avoid constipation    As long as patient has no history of breast cancer and post-menopausal:  Vaginal Hormone cream (Estrace, Premarin or Compounded) you place vaginally 2x a week using your finger(if no history of heart attack, blood clots, cervical, breast, uterine or ovarian cancer). This will help the good bacteria replenish in the vagina. Similar to a probiotic. Lack of hormones in the vagina in post-menopausal women is a common cause of UTIs in women.   Hormone cream- why you need it, how to use it  We all have bacteria that cover our bodies, however we want good bacteria, not bad bacteria. When you lack hormones,  your vagina starts to let the bad bacteria take over because there is an imbalance. The hormone cream allows for good bacteria to take over again and this can help decrease the risks of UTIs. It can also help with vaginal dryness.   Please let your provider know if you have a history of breast cancer, uterine cancer, cervical cancer or a history of blood clots or heart attack.  If it costs more than $70   we can write you for a compounded less expensive form of estrace cream from [employer]. Please let us know. They will mail it to you for around $8 or you can pick it up in [place]. They will call you for payment first and you can ask the address. If you haven't heard from them in 2 days, please call them to confirm they received the prescription.   Can also use goodrx  Do not use the applicator, just your finger. This will make it last longer. Apply small smear to 2nd knuckle. Apply around urethra and into vagina to 2nd knuckle. Prescription should last around 6 months. Apply  "inside vagina with finger daily x 2 weeks and then 2 to 3x per week after this indefinitely.  You will only see improvement if you use on a regular basis in about a month. (less dryness, maybe some less overactive bladder, less UTIs possibly if having UTIs).   It is not a medicine to use as needed. Typically a lifelong medication.     Other helpful information:    If you have a UTI try to self treat first for 1-2 days with conservative treatment:  Increase fluid intake - drink 4 to 5 bottles of water a day and empty bladder often  AZO for burning or urinary frequency (over the counter)  Utiva cranberry tablets when having uti symptoms: Take 2x a day for 2 days then daily for a week (buy from [employer]). Visit https://www.[employer] or call 1-478.296.6230 to order. They costs about $30/month and offer a subscribe and save option. They also have a probiotic and D mannose supplementation, if the patient is able to afford, I suggest taking. Utiva cranberry tablets only available from [employer] are equivalent to the suggested dose of 9 tablets if you buy over the counter.   D mannose 2000mx a day for 2 days then daily for a week (can buy from [employer] or over the counter)    If your symptoms persists despite increased water intake and azo then:  see pcp, gynecologist, or urgent care and make sure to give a clean catch urine or catheterized urine. This means wipe, urinate in the toilet, then provide a MID STREAM sample (your hand should get urine on it if you are doing it right). This avoids urine picking up the normal bacteria that line the vagina on the way out to the cup.   ask for a URINE CULTURE. You need to make sure you know what antibiotics will kill your particular bacteria  if you are told you have "multiple organisms" or "normal vaginal jana" it means the urine sample picked up the normal bacteria in the vagina and contaminated your urine specimen. If you are still having symptoms of a UTI then you " "will need to provide ANOTHER clean catch sample or CATHETERIZED urine to bypass the vagina and get urine directly from the bladder:     If you are given antibiotics from primary care, ER, urgent care or gynecologist:  only take 3 to 5 days of the antibiotics (unless you have diabetes or a urologist tells you take take more), even if they give you a 10d supply and keep or discard the rest  only take the full 10 days if you are having fever, chills or pain in your kidneys     Things to remember:   Try to avoid antibiotics or at least try to avoid taking them for more than 3 to 5 days for simple uti.    Bacteria are smart and they will become resistant to antibiotics. We have only a limited amount of antibiotics that work.   ALWAYS request a urine culture so you can know which antibiotics work.   If you ever see bloody red urine call us ASAP, even with uti's and even if you are on a blood thinner, this can sometimes be a sign of bladder cancer or kidney stones.     If you do have uti symptoms but do not have a culture proven uti (with E.coli, for example)  You may need a catheterized urine if it says "multiple organisms" which means normal vaginal jana  You may have a kidney stone, interstitial cystitis, overactive bladder, bladder cancer, so please call to make a follow-up     Once we rule out any anatomic obstruction and have given UTI recs, we will see for follow-up and then will have you return to your PCP/primary care physician  for symptomatic UTI treatment wit the information and recommendations we have given them.     -F/u with MD for cysto  -RTC with me 3 months to re-evaluate OAB symptoms on myrbetriq      I encouraged her or any of her family members to call or email me with questions and/or concerns.    Visit today is associated with current or anticipated ongoing medical care related to this patient's single serious condition/complex condition, OAB, recurrent UTI    60 minutes of total time spent on the " encounter, which includes face to face time and non-face to face time preparing to see the patient (eg, review of tests), Obtaining and/or reviewing separately obtained history, Documenting clinical information in the electronic or other health record, Independently interpreting results (not separately reported) and communicating results to the patient/family/caregiver, or Care coordination (not separately reported).

## 2024-11-13 NOTE — TELEPHONE ENCOUNTER
----- Message from Barrera Reyes MD sent at 11/13/2024  3:47 PM CST -----  Please offer 12/4, 12/11 or 12/18 for cystoscopy at the [facility]. Thanks.  ----- Message -----  From: Zelda Jerez NP  Sent: 11/13/2024   9:15 AM CST  To: Rosio Cross MD; #    Gross hematuria  Needs cysto please

## 2024-11-13 NOTE — PROGRESS NOTES
[facility]  Group: Tay/Esperanza/Jaimee/Eric  NPs: Chantal Perry/Zelda Jerez    Note today written by:Zelda Jeerz  Date of Service: 2024      CHIEF COMPLAINT: UTI.    PRESENTING ILLNESS:    Alexandria Estrada is a 74 y.o. female who presents for UTI. This is her initial clinic visit    24  Pt had UTI 24 treated at [facility] urgent care  Pt was also treated for UTI in August but only POCT UA was completed and pt was on AZO at the time    UTI symptoms include bladder pressure, urgency, voiding small amounts frequently  Pt does have UTI symptoms today. On AZO  Had 1 episode of gross hematuria 24  Denies flank pain, fever, chills, nausea or vomiting.  IO cath UA deferred today since on AZO  IO cath PVR 90 ml    Baseline voiding: pt was prescribed oxybutynin 10 mg for urinary frequency, urgency and urinary incontinence by PCP but she does not take everyday because she feels it causes her to retain fluid.  + mixed incontinence  Wears 1 pad per day    Denies constipation    Water intake: 4 bottles per day    Pt is post menopausal  Denies personal hx of breast or GYN cancer, MI or blood clots    History of kidney stones:  passed kidney stone ~10-15 year ago, no updated imaging on kidneys  History of recurrent UTI: in the past, maybe 1 UTI per year  Personal or family hx of  malignancy: + fam hx bladder CA, father. No renal cancer  History of  trauma: denies  Smoking history: former smoker, quit     Urine cultures:   Lab Results   Component Value Date    LABURIN ESCHERICHIA COLI 2013         REVIEW OF SYSTEMS: as stated above in hpi    PATIENT HISTORY:  Past Medical History:   Diagnosis Date    Hypertension     Wrist fracture 2023       Past Surgical History:   Procedure Laterality Date    BREAST BIOPSY Left     Yrs ago    BREAST CYST ASPIRATION Left     BREAST SURGERY      right breast lump     SECTION          Allergies:  No known drug allergies      PHYSICAL EXAMINATION:  Constitutional: She is oriented to person, place, and time. She appears well-developed and well-nourished.  She is in no apparent distress.  Abdominal:  She exhibits no distension.  There is no CVA tenderness.   Neurological: She is alert and oriented to person, place, and time.   Psych: Cooperative with normal affect.    Genitourinary:  Urethral meatus is normal  Consent verbally obtained. And in and out cath was performed under sterile conditions immediately after voiding. The PVR was 90 ml      Physical Exam      LABS:      Lab Results   Component Value Date    CREATININE 0.6 08/29/2024     Lab Results   Component Value Date    HGBA1C 5.6 01/17/2024         IMPRESSION:    Encounter Diagnoses   Name Primary?    Sensation of pressure in bladder area Yes    Urinary urgency     Gross hematuria     Overactive bladder     Recurrent UTI        PLAN:  -Catheterized urine sent for micro UA and culture  -Voided urine sent for cytology  -CT urogram with creatinine prior and cysto with MD for hematuria work up  Message sent to MDs regarding cysto. Staff to call and schedule    -Discussed OAB. Stop oxybutynin  Will try myrbetriq. Goal is to reduce frequency and incontinence and avoid pad usage  Discussed side effects and indications for myrbetriq. Prescription sent to the pharmacy. Pt verbalized understanding. 8/29/24 creatinine 0.6 / GFR>60  Conservative measures to control urgency and frequency including   1. Avoiding/minimizing bladder irritants (see below), especially in afternoon and evening hours  Discussed bladder irritants include coffee (even decaf), tea, alcohol, soda, spicy foods, acidic juices (orange, tomato), vinegar, and artificial sweeteners/sugary beverages.  2. timed voiding - empty on a schedule (approx ~2-3 hours) in spite of need to urinate, to get ahead of urge  3. dont postponing voiding - dont hold it on purpose   4. bowel regimen as  distended bowel has extrinsic compressive effect on bladder.   - any or all of the following in any combination, titrate to soft daily bowel movement without pushing or straining  - colace/stool softener capsule - once to twice daily  - miralax - 1 capful daily to start, can increase to 2x daily (or decrease to 1/2 cap daily)  - increase dietary fibery  - fiber supplements, such as metamucil  - prunes, prune juice  5. INCREASE water intake  6. Stop fluids 2 hours before bed, and urinate just before bed    -UTI Preventative DAILY supplements:  Recommend Buying a Cranberry Supplement that has 36mg PAC (only a few have this much) of cranberry - it is a very specific dose but many companies sell it.   Preferred: Utiva Cranberry Tablets (Utiva Cranberry PACs Supplement Pills $39.99 for 30 pills)- their particular tablet is the equivalent of 9 cranberry tablets that you buy over the counter. (phone 1-761.395.4044 or online https://www.Cinemagram/products/utiva-uti-control)  Uriexo Cranberry Tablets   [facility]  [place] $30/month: https://Kohort/products/cranberex-urinary-health-support  If unable to afford- can use over the counter cranberry caplets but will need to take 1500mg daily (about 3 capsules, 3x a day)   Ok to Buy [facility] the [facility] at [facility] (ask pharmacist for help) or buy from [employer] (see above)  Probiotic with lactobacillus - take once a day. This helps populate the vagina with good bacteria instead of bad bacteria- you can buy this over the counter or buy from the company [employer]. Make sure to look for LACTOBACILLUS on the bottle.   D-mannose supplement (2000mg a day)  $20/30d supply  This helps keep the bad bacteria from sticking to your bladder wall. You can buy this over the counter or buy from [employer] by visiting [employer].       UTI prevention recommendations  Drink more water to dilute the bacteria that are replicating. This will also help you urinate  more often if you tend to hold your bladder.   Timed voiding (which means- Urinate every 2 hours during the day) to rid the bladder of bacteria before they multiply and start to stick to your bladder walls and cause more symptoms and problems  Avoid pads if possible or wear thinner pads and change them more often  Avoid constipation    As long as patient has no history of breast cancer and post-menopausal:  Vaginal Hormone cream (Estrace, Premarin or Compounded) you place vaginally 2x a week using your finger(if no history of heart attack, blood clots, cervical, breast, uterine or ovarian cancer). This will help the good bacteria replenish in the vagina. Similar to a probiotic. Lack of hormones in the vagina in post-menopausal women is a common cause of UTIs in women.   Hormone cream- why you need it, how to use it  We all have bacteria that cover our bodies, however we want good bacteria, not bad bacteria. When you lack hormones,  your vagina starts to let the bad bacteria take over because there is an imbalance. The hormone cream allows for good bacteria to take over again and this can help decrease the risks of UTIs. It can also help with vaginal dryness.   Please let your provider know if you have a history of breast cancer, uterine cancer, cervical cancer or a history of blood clots or heart attack.  If it costs more than $70   we can write you for a compounded less expensive form of estrace cream from [employer]. Please let us know. They will mail it to you for around $8 or you can pick it up in [place]. They will call you for payment first and you can ask the address. If you haven't heard from them in 2 days, please call them to confirm they received the prescription.   Can also use goodrx  Do not use the applicator, just your finger. This will make it last longer. Apply small smear to 2nd knuckle. Apply around urethra and into vagina to 2nd knuckle. Prescription should last around 6 months. Apply  "inside vagina with finger daily x 2 weeks and then 2 to 3x per week after this indefinitely.  You will only see improvement if you use on a regular basis in about a month. (less dryness, maybe some less overactive bladder, less UTIs possibly if having UTIs).   It is not a medicine to use as needed. Typically a lifelong medication.     Other helpful information:    If you have a UTI try to self treat first for 1-2 days with conservative treatment:  Increase fluid intake - drink 4 to 5 bottles of water a day and empty bladder often  AZO for burning or urinary frequency (over the counter)  Utiva cranberry tablets when having uti symptoms: Take 2x a day for 2 days then daily for a week (buy from [employer]). Visit https://www.[employer] or call 1-711.339.4931 to order. They costs about $30/month and offer a subscribe and save option. They also have a probiotic and D mannose supplementation, if the patient is able to afford, I suggest taking. Utiva cranberry tablets only available from [employer] are equivalent to the suggested dose of 9 tablets if you buy over the counter.   D mannose 2000mx a day for 2 days then daily for a week (can buy from [employer] or over the counter)    If your symptoms persists despite increased water intake and azo then:  see pcp, gynecologist, or urgent care and make sure to give a clean catch urine or catheterized urine. This means wipe, urinate in the toilet, then provide a MID STREAM sample (your hand should get urine on it if you are doing it right). This avoids urine picking up the normal bacteria that line the vagina on the way out to the cup.   ask for a URINE CULTURE. You need to make sure you know what antibiotics will kill your particular bacteria  if you are told you have "multiple organisms" or "normal vaginal jana" it means the urine sample picked up the normal bacteria in the vagina and contaminated your urine specimen. If you are still having symptoms of a UTI then you " "will need to provide ANOTHER clean catch sample or CATHETERIZED urine to bypass the vagina and get urine directly from the bladder:     If you are given antibiotics from primary care, ER, urgent care or gynecologist:  only take 3 to 5 days of the antibiotics (unless you have diabetes or a urologist tells you take take more), even if they give you a 10d supply and keep or discard the rest  only take the full 10 days if you are having fever, chills or pain in your kidneys     Things to remember:   Try to avoid antibiotics or at least try to avoid taking them for more than 3 to 5 days for simple uti.    Bacteria are smart and they will become resistant to antibiotics. We have only a limited amount of antibiotics that work.   ALWAYS request a urine culture so you can know which antibiotics work.   If you ever see bloody red urine call us ASAP, even with uti's and even if you are on a blood thinner, this can sometimes be a sign of bladder cancer or kidney stones.     If you do have uti symptoms but do not have a culture proven uti (with E.coli, for example)  You may need a catheterized urine if it says "multiple organisms" which means normal vaginal jana  You may have a kidney stone, interstitial cystitis, overactive bladder, bladder cancer, so please call to make a follow-up     Once we rule out any anatomic obstruction and have given UTI recs, we will see for follow-up and then will have you return to your PCP/primary care physician  for symptomatic UTI treatment wit the information and recommendations we have given them.     -F/u with MD for cysto  -RTC with me 3 months to re-evaluate OAB symptoms on myrbetriq      I encouraged her or any of her family members to call or email me with questions and/or concerns.    Visit today is associated with current or anticipated ongoing medical care related to this patient's single serious condition/complex condition, OAB, recurrent UTI    60 minutes of total time spent on the " encounter, which includes face to face time and non-face to face time preparing to see the patient (eg, review of tests), Obtaining and/or reviewing separately obtained history, Documenting clinical information in the electronic or other health record, Independently interpreting results (not separately reported) and communicating results to the patient/family/caregiver, or Care coordination (not separately reported).

## 2024-11-13 NOTE — PATIENT INSTRUCTIONS
Conservative measures to control urgency and frequency including   1. Avoiding/minimizing bladder irritants (see below), especially in afternoon and evening hours    Discussed bladder irritants include coffee (even decaf), tea, alcohol, soda, spicy foods, acidic juices (orange, tomato), vinegar, and artificial sweeteners/sugary beverages.    2. timed voiding - empty on a schedule (approx ~2-3 hours) in spite of need to urinate, to get ahead of urge    3. dont postponing voiding - dont hold it on purpose     4. bowel regimen as distended bowel has extrinsic compressive effect on bladder.   - any or all of the following in any combination, titrate to soft daily bowel movement without pushing or straining  - colace/stool softener capsule - once to twice daily  - miralax - 1 capful daily to start, can increase to 2x daily (or decrease to 1/2 cap daily)  - increase dietary fibery  - fiber supplements, such as metamucil  - prunes, prune juice    5. INCREASE water intake    6. Stop fluids 2 hours before bed, and urinate just before bed  UTI Preventative DAILY supplements:  Recommend Buying a Cranberry Supplement that has 36mg PAC (only a few have this much) of cranberry - it is a very specific dose but many companies sell it.   Preferred: Utiva Cranberry Tablets (Utiva Cranberry PACs Supplement Pills $39.99 for 30 pills)- their particular tablet is the equivalent of 9 cranberry tablets that you buy over the counter. (phone 1-834.601.3428 or online https://www.[employer]/products/utiva-uti-control)  Uriexo Cranberry Tablets   [place]  [address] $30/month: https://Astro/products/cranberex-urinary-health-support  If unable to afford- can use over the counter cranberry caplets but will need to take 1500mg daily (about 3 capsules, 3x a day)   Ok to Buy Over the Counter at [employer], [employer] (ask pharmacist for help) or buy from [employer] (see above)  Probiotic with lactobacillus - take once a day. This  helps populate the vagina with good bacteria instead of bad bacteria- you can buy this over the counter or buy from the company [employer]. Make sure to look for LACTOBACILLUS on the bottle.   D-mannose supplement (2000mg a day)  $20/30d supply  This helps keep the bad bacteria from sticking to your bladder wall. You can buy this over the counter or buy from [employer] by visiting [facility].       UTI prevention recommendations  Drink more water to dilute the bacteria that are replicating. This will also help you urinate more often if you tend to hold your bladder.   Timed voiding (which means- Urinate every 2 hours during the day) to rid the bladder of bacteria before they multiply and start to stick to your bladder walls and cause more symptoms and problems  Avoid pads if possible or wear thinner pads and change them more often  Avoid constipation    As long as patient has no history of breast cancer and post-menopausal:  Vaginal Hormone cream (Estrace, Premarin or Compounded) you place vaginally 2x a week using your finger(if no history of heart attack, blood clots, cervical, breast, uterine or ovarian cancer). This will help the good bacteria replenish in the vagina. Similar to a probiotic. Lack of hormones in the vagina in post-menopausal women is a common cause of UTIs in women.     Hormone cream- why you need it, how to use it  We all have bacteria that cover our bodies, however we want good bacteria, not bad bacteria. When you lack hormones,  your vagina starts to let the bad bacteria take over because there is an imbalance. The hormone cream allows for good bacteria to take over again and this can help decrease the risks of UTIs. It can also help with vaginal dryness.   Please let your provider know if you have a history of breast cancer, uterine cancer, cervical cancer or a history of blood clots or heart attack.  If it costs more than $70   we can write you for a compounded less expensive form of  estrace cream from [employer]. Please let us know. They will mail it to you for around $8 or you can pick it up in [place]. They will call you for payment first and you can ask the address. If you haven't heard from them in 2 days, please call them to confirm they received the prescription.   Can also use goodrx  Do not use the applicator, just your finger. This will make it last longer. Apply small smear to 2nd knuckle. Apply around urethra and into vagina to 2nd knuckle. Prescription should last around 6 months. Apply inside vagina with finger daily x 2 weeks and then 2 to 3x per week after this indefinitely.  You will only see improvement if you use on a regular basis in about a month. (less dryness, maybe some less overactive bladder, less UTIs possibly if having UTIs).   It is not a medicine to use as needed. Typically a lifelong medication.           Other helpful information:    If you have a UTI try to self treat first for 1-2 days with conservative treatment:  Increase fluid intake - drink 4 to 5 bottles of water a day and empty bladder often  AZO for burning or urinary frequency (over the counter)  Utiva cranberry tablets when having uti symptoms: Take 2x a day for 2 days then daily for a week (buy from [employer]). Visit https://www."[employer]ent or call 1-788.905.8301 to order. They costs about $30/month and offer a subscribe and save option. They also have a probiotic and D mannose supplementation, if the patient is able to afford, I suggest taking. Utiva cranberry tablets only available from [employer] are equivalent to the suggested dose of 9 tablets if you buy over the counter.   D mannose 2000mx a day for 2 days then daily for a week (can buy from [employer] or over the counter)    If your symptoms persists despite increased water intake and azo then:  see pcp, gynecologist, or urgent care and make sure to give a clean catch urine or catheterized urine. This means wipe, urinate in the  "toilet, then provide a MID STREAM sample (your hand should get urine on it if you are doing it right). This avoids urine picking up the normal bacteria that line the vagina on the way out to the cup.   ask for a URINE CULTURE. You need to make sure you know what antibiotics will kill your particular bacteria  if you are told you have "multiple organisms" or "normal vaginal jana" it means the urine sample picked up the normal bacteria in the vagina and contaminated your urine specimen. If you are still having symptoms of a UTI then you will need to provide ANOTHER clean catch sample or CATHETERIZED urine to bypass the vagina and get urine directly from the bladder:     If you are given antibiotics from primary care, ER, urgent care or gynecologist:  only take 3 to 5 days of the antibiotics (unless you have diabetes or a urologist tells you take take more), even if they give you a 10d supply and keep or discard the rest  only take the full 10 days if you are having fever, chills or pain in your kidneys     Things to remember:   Try to avoid antibiotics or at least try to avoid taking them for more than 3 to 5 days for simple uti.    Bacteria are smart and they will become resistant to antibiotics. We have only a limited amount of antibiotics that work.   ALWAYS request a urine culture so you can know which antibiotics work.   If you ever see bloody red urine call us ASAP, even with uti's and even if you are on a blood thinner, this can sometimes be a sign of bladder cancer or kidney stones.     If you do have uti symptoms but do not have a culture proven uti (with E.coli, for example)  You may need a catheterized urine if it says "multiple organisms" which means normal vaginal jana  You may have a kidney stone, interstitial cystitis, overactive bladder, bladder cancer, so please call to make a follow-up     Once we rule out any anatomic obstruction and have given UTI recs, we will see for follow-up and then will " have you return to your PCP/primary care physician  for symptomatic UTI treatment wit the information and recommendations we have given them.

## 2024-11-14 DIAGNOSIS — R31.0 GROSS HEMATURIA: Primary | ICD-10-CM

## 2024-11-14 NOTE — PROGRESS NOTES
Procedure Order to Urology [[id number]]    Electronically signed by: Barrera Reyes Jr., MD on 11/14/24 1409 Status: Active   Ordering user: Barrera Reyes Jr., MD 11/14/24 1409 Authorized by: Barrera Reyes Jr., MD   Ordering mode: Standard   Frequency:  11/14/24 -     Diagnoses  Gross hematuria [R31.0]   Questionnaire    Question Answer   Procedure Cystoscopy   Pre-op Diagnosis Gross hematuria   Facility Name: [facility]   Order comments: Cystoscopy on 12/11/24 at the [facility].   [facility], Please order Urine POCT without micro . Local sedation (no urine Dr Reyes or Dr thompson)

## 2024-11-15 ENCOUNTER — PATIENT MESSAGE (OUTPATIENT)
Dept: UROLOGY | Facility: CLINIC | Age: 74
End: 2024-11-15
Payer: MEDICARE

## 2024-11-15 ENCOUNTER — TELEPHONE (OUTPATIENT)
Dept: UROLOGY | Facility: CLINIC | Age: 74
End: 2024-11-15
Payer: MEDICARE

## 2024-11-15 LAB — BACTERIA UR CULT: ABNORMAL

## 2024-11-15 RX ORDER — NITROFURANTOIN 25; 75 MG/1; MG/1
100 CAPSULE ORAL 2 TIMES DAILY
Qty: 10 CAPSULE | Refills: 0 | Status: SHIPPED | OUTPATIENT
Start: 2024-11-15 | End: 2024-11-20

## 2024-11-15 NOTE — TELEPHONE ENCOUNTER
Called pt regarding CT and prelim culture results  No answer  Left VM to return call to clinic    Urine culture resulted prelim results showing UTI but no organism or sensitivity completed yet.  Will send macrobid to pt's pharmacy on file to start over the weekend until I am able to review culture Monday. Will adjust antibiotic if needed based on culture results.  Will send portal message with updated information.  FEMI listed

## 2024-11-18 ENCOUNTER — TELEPHONE (OUTPATIENT)
Dept: UROLOGY | Facility: CLINIC | Age: 74
End: 2024-11-18
Payer: MEDICARE

## 2024-11-18 DIAGNOSIS — N39.0 RECURRENT UTI: Primary | ICD-10-CM

## 2024-11-18 RX ORDER — NITROFURANTOIN 25; 75 MG/1; MG/1
100 CAPSULE ORAL 2 TIMES DAILY
Qty: 4 CAPSULE | Refills: 0 | Status: SHIPPED | OUTPATIENT
Start: 2024-11-18 | End: 2024-11-20

## 2024-11-18 NOTE — TELEPHONE ENCOUNTER
Spoke with pt regarding urine culture results  + UTI, e coli  Sensitive to macrobid, which pt started taking Saturday  Will send additional 2 days of antibiotic to take for total of 7 days  UTI symptoms improved on macrobid    Reviewed CT urogram results with pt  Will continue to monitor bilateral renal stones, no hydro  Simple left renal cyst    F/u with PCP for all additional findings not urology related    Proceed with cysto as scheduled  Will repeat culture 10 days prior to cysto. Staff to call and schedule

## 2024-12-11 ENCOUNTER — HOSPITAL ENCOUNTER (OUTPATIENT)
Facility: HOSPITAL | Age: 74
Discharge: HOME | End: 2024-12-11
Attending: UROLOGY | Admitting: UROLOGY
Payer: MEDICARE

## 2024-12-11 ENCOUNTER — TELEPHONE (OUTPATIENT)
Dept: UROLOGY | Facility: CLINIC | Age: 74
End: 2024-12-11
Payer: MEDICARE

## 2024-12-11 DIAGNOSIS — R31.0 GROSS HEMATURIA: Primary | ICD-10-CM

## 2024-12-11 PROCEDURE — 87086 URINE CULTURE/COLONY COUNT: CPT | Performed by: UROLOGY

## 2024-12-11 PROCEDURE — 25000003 PHARM REV CODE 250: Performed by: UROLOGY

## 2024-12-11 PROCEDURE — 52000 CYSTOURETHROSCOPY: CPT | Mod: ,,, | Performed by: UROLOGY

## 2024-12-11 PROCEDURE — 52000 CYSTOURETHROSCOPY: CPT | Performed by: UROLOGY

## 2024-12-11 RX ORDER — LIDOCAINE HYDROCHLORIDE 20 MG/ML
JELLY TOPICAL
Status: DISCONTINUED | OUTPATIENT
Start: 2024-12-11 | End: 2024-12-11 | Stop reason: HOSPADM

## 2024-12-11 RX ORDER — CEPHALEXIN 500 MG/1
500 CAPSULE ORAL EVERY 12 HOURS
Qty: 6 CAPSULE | Refills: 0 | Status: SHIPPED | OUTPATIENT
Start: 2024-12-11 | End: 2024-12-14

## 2024-12-11 NOTE — PLAN OF CARE
Discharge instructions given to pt, verbalized understanding.  Refused fluids.  Denies pain.  Voided x1.  Prescription sent to pharmacy per dr fonseca.  Ambulating out to self care in no distress.

## 2024-12-11 NOTE — OP NOTE
[facility]  Operative/Discharge Note    Date: 12/11/2024    Pre-Op Diagnosis: Gross hematuria    Post-Op Diagnosis: Same    Procedure(s) Performed:   1.  Cystoscopy     Specimen(s): Urine culture    Staff Surgeon: Barrera Reyes MD    Assistant Surgeon: Barrera Reyes Jr, MD    Anesthesia: Local anesthesia topical 2% lidocaine gel    Indications: Alexandria Estrada is a 74 y.o. female with gross hematuria.     Findings: Unremarkable cystoscopy.     Estimated Blood Loss: min    Drains: None    Procedure in Detail:  After risks, benefits and possible complications of cystoscopy were explained, the patient elected to undergo the procedure and informed consent was obtained. All questions were answered in the olivia-operative area. The patient was transferred to the cystoscopy suite and placed in the lithotomy position.  The patient was prepped and draped in the usual sterile fashion. Lidocaine jelly was administered for local anesthesia. Time out was performed.    A flexible cystoscope was introduced into the bladder per urethra. This passed easily.  The entire urethra was visualized which showed no masses or strictures. The right and left ureteral orifices were identified in the normal anatomic position and were seen effluxing clear urine.  Formal cystoscopy was performed which revealed no masses or lesions suspicious for malignancy, no trabeculations, no bladder stones and no bladder diverticula.      The patient tolerated the procedure well and was transferred to recovery in stable condition.    Disposition: Home    Discharge home today status post uncomplicated procedure as above  Diet - resume home diet  Follow up: Keep scheduled follow up with AVTAR Jerez for OAB. Holding Myrbetriq currently due to HTN. Will restart later if elevated BP not from medication.   Instructions: Keflex x 3 days.   Meds:     Medication List        START taking these medications      cephALEXin 500 MG capsule  Commonly known as:  KEFLEX  Take 1 capsule (500 mg total) by mouth every 12 (twelve) hours. for 3 days            CONTINUE taking these medications      amLODIPine 2.5 MG tablet  Commonly known as: NORVASC  Take 1 tablet (2.5 mg total) by mouth once daily.     CALCIUM-VITAMIN D ORAL     CRANBERRY ORAL     estradioL 0.01 % (0.1 mg/gram) vaginal cream  Commonly known as: ESTRACE  Place 1 g vaginally twice a week.     gabapentin 300 MG capsule  Commonly known as: NEURONTIN  TAKE 1 CAPSULE BY MOUTH THREE TIMES DAILY     magnesium 200 mg Tab     mirabegron 25 mg Tb24 ER tablet  Commonly known as: MYRBETRIQ  Take 1 tablet (25 mg total) by mouth once daily.     omeprazole 40 MG capsule  Commonly known as: PRILOSEC  Take 1 capsule (40 mg total) by mouth once daily.     TURMERIC ORAL     VITAMIN C ORAL            STOP taking these medications      alendronate 70 MG tablet  Commonly known as: FOSAMAX               Where to Get Your Medications        These medications were sent to [facility] - [place] - [address]  [address] [place] [address] ([place]) [place] [address]      Phone: [phone]   cephALEXin 500 MG capsule         Barrera Reyes Jr, MD

## 2024-12-11 NOTE — TELEPHONE ENCOUNTER
Spoke with patient concerning HTN. Patient stated she thinks HTN is due to myrbetriq. Explained to stop merbetriq for 1 week, monitor BP and f/u w/ PC if HTN persist. Patient voiced understanding

## 2024-12-11 NOTE — TELEPHONE ENCOUNTER
----- Message from Jordon sent at 12/11/2024 11:58 AM CST -----  Contact: Self  Type: Needs Medical Advice  Who Called:  Patient    Best Call Back Number: [phone]   Additional Information: Called to speak with office. Has concerns about medication. Please call

## 2024-12-12 VITALS
WEIGHT: 127 LBS | HEIGHT: 66 IN | SYSTOLIC BLOOD PRESSURE: 169 MMHG | RESPIRATION RATE: 16 BRPM | DIASTOLIC BLOOD PRESSURE: 83 MMHG | BODY MASS INDEX: 20.41 KG/M2 | HEART RATE: 68 BPM | TEMPERATURE: 99 F | OXYGEN SATURATION: 97 %

## 2024-12-14 LAB — BACTERIA UR CULT: NO GROWTH

## 2025-01-27 ENCOUNTER — OFFICE VISIT (OUTPATIENT)
Dept: PRIMARY CARE CLINIC | Facility: CLINIC | Age: 75
End: 2025-01-27
Payer: MEDICARE

## 2025-01-27 VITALS
SYSTOLIC BLOOD PRESSURE: 142 MMHG | HEART RATE: 61 BPM | HEIGHT: 66 IN | BODY MASS INDEX: 19.46 KG/M2 | DIASTOLIC BLOOD PRESSURE: 88 MMHG | WEIGHT: 121.06 LBS | OXYGEN SATURATION: 95 %

## 2025-01-27 DIAGNOSIS — N32.81 OVERACTIVE BLADDER: ICD-10-CM

## 2025-01-27 DIAGNOSIS — M41.9 SCOLIOSIS OF LUMBOSACRAL SPINE, UNSPECIFIED SCOLIOSIS TYPE: Chronic | ICD-10-CM

## 2025-01-27 DIAGNOSIS — Z87.442 HISTORY OF NEPHROLITHIASIS: ICD-10-CM

## 2025-01-27 DIAGNOSIS — S39.012D LUMBOSACRAL STRAIN, SUBSEQUENT ENCOUNTER: ICD-10-CM

## 2025-01-27 DIAGNOSIS — K21.9 GASTROESOPHAGEAL REFLUX DISEASE WITHOUT ESOPHAGITIS: ICD-10-CM

## 2025-01-27 DIAGNOSIS — I10 HTN (HYPERTENSION), BENIGN: Primary | ICD-10-CM

## 2025-01-27 PROBLEM — H25.9 AGE-RELATED CATARACT OF BOTH EYES: Status: RESOLVED | Noted: 2024-08-20 | Resolved: 2025-01-27

## 2025-01-27 PROCEDURE — 3288F FALL RISK ASSESSMENT DOCD: CPT | Mod: CPTII,S$GLB,, | Performed by: FAMILY MEDICINE

## 2025-01-27 PROCEDURE — 1101F PT FALLS ASSESS-DOCD LE1/YR: CPT | Mod: CPTII,S$GLB,, | Performed by: FAMILY MEDICINE

## 2025-01-27 PROCEDURE — 3008F BODY MASS INDEX DOCD: CPT | Mod: CPTII,S$GLB,, | Performed by: FAMILY MEDICINE

## 2025-01-27 PROCEDURE — 99999 PR PBB SHADOW E&M-EST. PATIENT-LVL III: CPT | Mod: PBBFAC,,, | Performed by: FAMILY MEDICINE

## 2025-01-27 PROCEDURE — 3079F DIAST BP 80-89 MM HG: CPT | Mod: CPTII,S$GLB,, | Performed by: FAMILY MEDICINE

## 2025-01-27 PROCEDURE — 1159F MED LIST DOCD IN RCRD: CPT | Mod: CPTII,S$GLB,, | Performed by: FAMILY MEDICINE

## 2025-01-27 PROCEDURE — 3077F SYST BP >= 140 MM HG: CPT | Mod: CPTII,S$GLB,, | Performed by: FAMILY MEDICINE

## 2025-01-27 PROCEDURE — 1126F AMNT PAIN NOTED NONE PRSNT: CPT | Mod: CPTII,S$GLB,, | Performed by: FAMILY MEDICINE

## 2025-01-27 PROCEDURE — 99214 OFFICE O/P EST MOD 30 MIN: CPT | Mod: S$GLB,,, | Performed by: FAMILY MEDICINE

## 2025-01-27 RX ORDER — GABAPENTIN 300 MG/1
300 CAPSULE ORAL 3 TIMES DAILY
Qty: 270 CAPSULE | Refills: 1 | Status: SHIPPED | OUTPATIENT
Start: 2025-01-27

## 2025-01-27 RX ORDER — LATANOPROST 50 UG/ML
1 SOLUTION/ DROPS OPHTHALMIC NIGHTLY
COMMUNITY
Start: 2024-12-18

## 2025-01-27 NOTE — PROGRESS NOTES
Dictation #1  MRN:[id number]  CSN:[id number]   Subjective:       Patient ID: Alexandria Estrada is a 74 y.o. female.    Chief Complaint: Follow-up (6 month)      HPI 74-year-old white female in for six-month checkup--eating well--+BM--ambulating well  Had bilateral cataract surgery eyes are doing well  Hypertension blood pressure 142/88 amlodipine 2.5 mg  GERD on omeprazole p.r.n.  Urinary incontinence on Myrbetriq takes p.r.n.  Has neuropathy left foot on gabapentin  Vaginal atrophy on Estrace vaginal cream  CT scan showed hepatic cyst/renal cyst/bilateral renal stones nonobstructing--bladder OK now was getting monthly        ROS:  Skin: no psoriasis, eczema, skin cancer skin lesion    HEENT: no  HA no ocular pain, blurred vision, diplopia, epistaxis, no hoarseness no change in voice thyroid trouble    Lung: No pneumonia, asthma, Tb, wheezing, SOB, no smoking  Heart: No chest pain, ankle edema, palpitations, MI, elaine murmur, +hypertension blood pressure ,no hyperlipidemia--no stent bypass arrhythmia--positive peripheral vascular disease   Abdomen:  +GERD with certain foodsOK with medication has been able decrease amount of medication No nausea, vomiting,  diarrhea, constipation, ulcers, hepatitis, gallbladder disease, melena, hematochezia, hematemesis  :  Has overactive bladder + hx UTI OK now    history renal stone 20 years ago-on oxybutynin for bladder spasm Nov has kidney stones   GYN -LMP age 50 last mammogram getting tomorrow   MS: no fractures, + O/A--needs hands severe scoliosis lumbar spine, right heel --nocturnal leg cramps--as pain in the left lower leg lateral aspect and left heel--neuropathy especially left plantar area history osteoporosis  Neuro:  No dizziness,no  LOC, seizures   No diabetes, no anemia,no  anxiety, no depression  , 2 daughters, work retired ,  lives with    Objective:   Physical Exam:   General: Well nourished, well developed, no acute distress +thin  Skin: No  lesions  HEENT: Eyes PERRLA, EOM intact, nose clear  throat nonerythematous ears clear fluid  NECK: Supple, no bruits, No JVD, no nodes  Lungs: Clear, no rales, rhonchi, wheezing   Heart: Regular rate and rhythm, no murmurs, gallops, or rubs  Abdomen: flat, bowel sounds positive, no tenderness, or organomegaly some pressure in suprapubic area and groin area  MS:   pain right  knee -- crepitus with flexion and extension of the knee-- no instability to the j-oint--- able to squat residential down able to arise without difficulty  Neuro: Alert, CN intact, oriented X 3  Extremities: No cyanosis, clubbing, or edema         Assessment:       1. HTN (hypertension), benign    2. History of nephrolithiasis    3. Gastroesophageal reflux disease without esophagitis    4. Lumbosacral strain, subsequent encounter    5. Overactive bladder    6. Scoliosis of lumbosacral spine, unspecified scoliosis type                      Plan:       HTN (hypertension), benign  -     Comprehensive Metabolic Panel; Future; Expected date: 01/27/2025  -     CBC Auto Differential; Future; Expected date: 01/27/2025  -     Lipid Panel; Future; Expected date: 01/27/2025  -     T4, Free; Future; Expected date: 01/27/2025  -     TSH; Future; Expected date: 01/27/2025    History of nephrolithiasis    Gastroesophageal reflux disease without esophagitis    Lumbosacral strain, subsequent encounter    Overactive bladder    Scoliosis of lumbosacral spine, unspecified scoliosis type                      Main Reason for Visit  Bilateral cataract surgery --patient seeing well  Recurrent UTI--saw urologist---had CT scan urogram showing liver cysts/renal cysts/bilateral nonobstructing kidney stone has appointment with Urology  Other medical issues  Scoliosis --still back pain on diclofenac   Back pain--severe scoliosis thoracolumbar spine--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 diclofenac 50 mg b.i.d  History  neuropathy left foot on gabapentin   Hypertension blood pressure was 142/80 now amlodipine 2.5 qd-- low Na diet--do daily blood pressure checks 1 blood pressure to be 139/89 or less than greater than 90/60   GERD on omeprazole --recent flare needs refills   History osteoporosis on Fosamax--hx GI issues so fosfamax stopped   History overactive bladder on Myrbetriq  Severe scoliosis/restless leg syndrome/history nephrolithiasis  Lab  CBC cMP lipid  T4 TSH in March   Return 6 mo     Health maintenance

## 2025-01-27 NOTE — TELEPHONE ENCOUNTER
No care due was identified.  Health [facility] Embedded Care Due Messages. Reference number: [id number].   1/27/2025 10:22:22 AM CST

## 2025-02-10 ENCOUNTER — PATIENT MESSAGE (OUTPATIENT)
Dept: PRIMARY CARE CLINIC | Facility: CLINIC | Age: 75
End: 2025-02-10
Payer: MEDICARE

## 2025-02-13 ENCOUNTER — OFFICE VISIT (OUTPATIENT)
Dept: UROLOGY | Facility: CLINIC | Age: 75
End: 2025-02-13
Payer: MEDICARE

## 2025-02-13 VITALS — BODY MASS INDEX: 19.46 KG/M2 | HEIGHT: 66 IN | WEIGHT: 121.06 LBS

## 2025-02-13 DIAGNOSIS — N32.81 OAB (OVERACTIVE BLADDER): ICD-10-CM

## 2025-02-13 DIAGNOSIS — N39.0 RECURRENT UTI: Primary | ICD-10-CM

## 2025-02-13 LAB — POC RESIDUAL URINE VOLUME: 0 ML (ref 0–100)

## 2025-02-13 PROCEDURE — G2211 COMPLEX E/M VISIT ADD ON: HCPCS | Mod: S$GLB,,, | Performed by: NURSE PRACTITIONER

## 2025-02-13 PROCEDURE — 1160F RVW MEDS BY RX/DR IN RCRD: CPT | Mod: CPTII,S$GLB,, | Performed by: NURSE PRACTITIONER

## 2025-02-13 PROCEDURE — 99999 PR PBB SHADOW E&M-EST. PATIENT-LVL III: CPT | Mod: PBBFAC,,, | Performed by: NURSE PRACTITIONER

## 2025-02-13 PROCEDURE — 1159F MED LIST DOCD IN RCRD: CPT | Mod: CPTII,S$GLB,, | Performed by: NURSE PRACTITIONER

## 2025-02-13 PROCEDURE — 1126F AMNT PAIN NOTED NONE PRSNT: CPT | Mod: CPTII,S$GLB,, | Performed by: NURSE PRACTITIONER

## 2025-02-13 PROCEDURE — 1101F PT FALLS ASSESS-DOCD LE1/YR: CPT | Mod: CPTII,S$GLB,, | Performed by: NURSE PRACTITIONER

## 2025-02-13 PROCEDURE — 3288F FALL RISK ASSESSMENT DOCD: CPT | Mod: CPTII,S$GLB,, | Performed by: NURSE PRACTITIONER

## 2025-02-13 PROCEDURE — 51798 US URINE CAPACITY MEASURE: CPT | Mod: S$GLB,,, | Performed by: NURSE PRACTITIONER

## 2025-02-13 PROCEDURE — 99214 OFFICE O/P EST MOD 30 MIN: CPT | Mod: S$GLB,,, | Performed by: NURSE PRACTITIONER

## 2025-02-13 RX ORDER — OXYBUTYNIN CHLORIDE 10 MG/1
10 TABLET, EXTENDED RELEASE ORAL DAILY
Qty: 90 TABLET | Refills: 3 | Status: SHIPPED | OUTPATIENT
Start: 2025-02-13

## 2025-02-13 NOTE — PROGRESS NOTES
[facility]  Group: Tay/Esperanza/Jaimee/Eric  NPs: Chantal Perry/Zelda Jerez    Note today written by:Zelda Jerez  Date of Service: 02/13/2025      CHIEF COMPLAINT: F/u UTI, OAB    PRESENTING ILLNESS:    Alexandria Estrada is a 75 y.o. female who presents for f/u UTI, OAB. Last clinic visit was 11/13/24 2/13/25 12/11/24 S/p cysto with Dr Reyes, Unremarkable cystoscopy.   11/15/24 CTU: Kidneys: Normal in size and location. 0.9 cm low-attenuation left renal lesion, likely a cyst. No cortical enhancing lesions. Bilateral nonobstructing renal stones measuring up to 0.4 cm. No hydronephrosis or hydroureter. Bladder is fluid filled and unremarkable.     No UTIs since cysto.   Using estrace cream as ordered  No further gross hematuria per pt    Pt only takes OAB medication when she leaves home. She is no longer taking myrbetriq but found no improvement and had elevated BP. Prefers to go back to oxybutynin prn. She does not want to take daily medication for symptoms.  + Urinary frequency, urgency and mild mixed urinary incontinence. 1 pad per day  Denies dysuria, gross hematuria, flank pain, fever, chills, nausea or vomiting    11/13/24  Pt had UTI 9/24/24 treated at [facility] urgent care  Pt was also treated for UTI in August but only POCT UA was completed and pt was on AZO at the time    UTI symptoms include bladder pressure, urgency, voiding small amounts frequently  Pt does have UTI symptoms today. On AZO  Had 1 episode of gross hematuria Friday 11/8/24  Denies flank pain, fever, chills, nausea or vomiting.  IO cath UA deferred today since on AZO  IO cath PVR 90 ml    Baseline voiding: pt was prescribed oxybutynin 10 mg for urinary frequency, urgency and urinary incontinence by PCP but she does not take everyday because she feels it causes her to retain fluid.  + mixed incontinence  Wears 1 pad per day    Denies constipation    Water intake: 4 bottles  per day    Pt is post menopausal  Denies personal hx of breast or GYN cancer, MI or blood clots    History of kidney stones:  passed kidney stone ~10-15 year ago, no updated imaging on kidneys  History of recurrent UTI: in the past, maybe 1 UTI per year  Personal or family hx of  malignancy: + fam hx bladder CA, father. No renal cancer  History of  trauma: denies  Smoking history: former smoker, quit     Urine cultures:   Lab Results   Component Value Date    LABURIN No growth 2024    LABURIN  2024     Multiple organisms isolated. None in predominance.  Repeat if    LABURIN clinically necessary. 2024    LABURIN ESCHERICHIA COLI  > 100,000 cfu/ml   (A) 2024    LABURIN ESCHERICHIA COLI 2013         REVIEW OF SYSTEMS: as stated above in hpi    PATIENT HISTORY:  Past Medical History:   Diagnosis Date    Hypertension     Kidney stones     OAB (overactive bladder)     Urinary tract infection, site not specified     Wrist fracture 2023       Past Surgical History:   Procedure Laterality Date    BREAST BIOPSY Left     Yrs ago    BREAST CYST ASPIRATION Left     BREAST SURGERY      right breast lump     SECTION      CYSTOSCOPY N/A 2024    Procedure: CYSTOSCOPY;  Surgeon: Barrera Reyes Jr., MD;  Location: [facility];  Service: Urology;  Laterality: N/A;       Allergies:  No known drug allergies      PHYSICAL EXAMINATION:  Constitutional: She is oriented to person, place, and time. She appears well-developed and well-nourished.  She is in no apparent distress.  Abdominal:  She exhibits no distension.  There is no CVA tenderness.   Neurological: She is alert and oriented to person, place, and time.   Psych: Cooperative with normal affect.    Physical Exam      LABS:      Lab Results   Component Value Date    CREATININE 0.7 11/15/2024     Lab Results   Component Value Date    HGBA1C 5.6 2024         IMPRESSION:    Encounter Diagnoses   Name Primary?    Recurrent UTI  Yes    OAB (overactive bladder)        PLAN:  -Continue UTI prevention as previously discussed  No refill needed on estrace cream    -Discussed OAB. Refilled oxybutynin to take prn when pt leaves the home for OAB symptoms  Conservative measures to control urgency and frequency including   1. Avoiding/minimizing bladder irritants (see below), especially in afternoon and evening hours  Discussed bladder irritants include coffee (even decaf), tea, alcohol, soda, spicy foods, acidic juices (orange, tomato), vinegar, and artificial sweeteners/sugary beverages.  2. timed voiding - empty on a schedule (approx ~2-3 hours) in spite of need to urinate, to get ahead of urge  3. dont postponing voiding - dont hold it on purpose   4. bowel regimen as distended bowel has extrinsic compressive effect on bladder.   - any or all of the following in any combination, titrate to soft daily bowel movement without pushing or straining  - colace/stool softener capsule - once to twice daily  - miralax - 1 capful daily to start, can increase to 2x daily (or decrease to 1/2 cap daily)  - increase dietary fibery  - fiber supplements, such as metamucil  - prunes, prune juice  5. INCREASE water intake  6. Stop fluids 2 hours before bed, and urinate just before bed    If OAB symptoms worsen and become more bothersome, can consider gemtesa.    -Bilateral non-obstructing renal stones seen on CT, will continue to monitor  CIERRA ordered    --RTC at annual visit in 11/2025 with CIERRA prior      I encouraged her or any of her family members to call or email me with questions and/or concerns.    Visit today is associated with current or anticipated ongoing medical care related to this patient's single serious condition/complex condition, OAB, recurrent UTI    30 minutes of total time spent on the encounter, which includes face to face time and non-face to face time preparing to see the patient (eg, review of tests), Obtaining and/or reviewing separately  obtained history, Documenting clinical information in the electronic or other health record, Independently interpreting results (not separately reported) and communicating results to the patient/family/caregiver, or Care coordination (not separately reported).

## 2025-02-17 ENCOUNTER — PATIENT OUTREACH (OUTPATIENT)
Dept: ADMINISTRATIVE | Facility: HOSPITAL | Age: 75
End: 2025-02-17
Payer: MEDICARE

## 2025-02-17 VITALS — HEART RATE: 66 BPM | SYSTOLIC BLOOD PRESSURE: 120 MMHG | DIASTOLIC BLOOD PRESSURE: 78 MMHG

## 2025-02-17 NOTE — PROGRESS NOTES
Health Maintenance Due   Topic Date Due    Influenza Vaccine (1) 09/01/2024    COVID-19 Vaccine (5 - 2024-25 season) 09/01/2024    RSV Vaccine (Age 60+ and Pregnant patients) (1 - 1-dose 75+ series) Never done     Care Coordination Encounter Details:       MyChart Portal Status:         [x]  Reviewed MyChart Portal Status offered / enrolled if applicable        Additional Notes:     MyChart Outcomes: Pt is enrolled & active          Updates Requested / Reviewed:        Updated Care Coordination Note, Care Everywhere, Care Team Updated, and Immunizations Reconciliation Completed or Queried: [place]         Health Maintenance Screening(s) Due:      Health Maintenance Topics Overdue:      VBHM Score: 0     Uncontrolled BP  Patient is not due for any topics at this time.    Influenza Vaccine  RSV Vaccine                  Health Maintenance Topic(s) Outreach Outcomes & Actions Taken:    Blood Pressure - Outreach Outcomes & Actions Taken  : Remote Blood Pressure Reading Captured. Patient reports BP reading on 2/10/2025, 120/78, pulse 66. Documented in flowsheets         Chronic Disease Management:     Diabetes Measures        Lab Results   Component Value Date    HGBA1C 5.6 01/17/2024           [x]  Reviewed chart for active Diabetes diagnosis     []  Scheduled necessary follow up appointments if needed         Additional Notes:  Patient is not diabetic            Hypertension Measures        BP Readings from Last 1 Encounters:   02/10/25 120/78           [x]  Reviewed chart for active Hypertension diagnosis     [x]  Reviewed & documented Home BP Cuff     [x]  Documented a Remote BP if needed & applicable     []  Scheduled necessary follow up appointments with Primary Care if needed         Additional Notes:  Updated            Provider Team Continuity:     Last PCP Visit Date: 1/27/2025          [x]  Reviewed Primary Care Provider Visits, Annual Wellness Visit, and Future          Appointments to ensure appointments  have been scheduled and/or           completed        Additional Notes:             Social Determinants of Health          [x]  Reviewed, completed, and/or updated the following sections:                  Food Insecurity, Transportation Needs, Financial Resource Strain,                 Tobacco Use        Additional Notes:  [facility]            Care Management, Digital Medicine, and/or Education Referrals    OPCM Risk Score: 37.3         Patient is eligible for digital medicine. Portal message sent          Additional Notes:

## 2025-02-21 DIAGNOSIS — Z00.00 ENCOUNTER FOR MEDICARE ANNUAL WELLNESS EXAM: ICD-10-CM

## 2025-02-28 RX ORDER — AMLODIPINE BESYLATE 2.5 MG/1
2.5 TABLET ORAL DAILY
Qty: 90 TABLET | Refills: 3 | Status: SHIPPED | OUTPATIENT
Start: 2025-02-28

## 2025-02-28 NOTE — TELEPHONE ENCOUNTER
Refill Decision Note   Alexandria Estrada  is requesting a refill authorization.  Brief Assessment and Rationale for Refill:  Approve     Medication Therapy Plan:        Comments:     Note composed:8:53 AM 02/28/2025

## 2025-02-28 NOTE — TELEPHONE ENCOUNTER
No care due was identified.  Health [facility] Embedded Care Due Messages. Reference number: [id number].   2/27/2025 7:22:35 PM CST

## 2025-07-30 ENCOUNTER — OFFICE VISIT (OUTPATIENT)
Dept: PRIMARY CARE CLINIC | Facility: CLINIC | Age: 75
End: 2025-07-30
Payer: MEDICARE

## 2025-07-30 VITALS
BODY MASS INDEX: 20.57 KG/M2 | TEMPERATURE: 98 F | WEIGHT: 127.44 LBS | RESPIRATION RATE: 14 BRPM | HEART RATE: 76 BPM | SYSTOLIC BLOOD PRESSURE: 118 MMHG | OXYGEN SATURATION: 95 % | DIASTOLIC BLOOD PRESSURE: 74 MMHG

## 2025-07-30 DIAGNOSIS — M41.9 SCOLIOSIS OF LUMBOSACRAL SPINE, UNSPECIFIED SCOLIOSIS TYPE: Chronic | ICD-10-CM

## 2025-07-30 DIAGNOSIS — M19.079 OSTEOARTHRITIS OF ANKLE AND FOOT, UNSPECIFIED LATERALITY: ICD-10-CM

## 2025-07-30 DIAGNOSIS — K21.9 GASTROESOPHAGEAL REFLUX DISEASE WITHOUT ESOPHAGITIS: ICD-10-CM

## 2025-07-30 DIAGNOSIS — N32.81 OVERACTIVE BLADDER: ICD-10-CM

## 2025-07-30 DIAGNOSIS — I73.9 PERIPHERAL VASCULAR DISEASE: ICD-10-CM

## 2025-07-30 DIAGNOSIS — I10 HTN (HYPERTENSION), BENIGN: Primary | ICD-10-CM

## 2025-07-30 DIAGNOSIS — M17.11 OSTEOARTHRITIS OF RIGHT KNEE, UNSPECIFIED OSTEOARTHRITIS TYPE: ICD-10-CM

## 2025-07-30 DIAGNOSIS — S39.012D LUMBOSACRAL STRAIN, SUBSEQUENT ENCOUNTER: ICD-10-CM

## 2025-07-30 DIAGNOSIS — Z87.442 HISTORY OF NEPHROLITHIASIS: ICD-10-CM

## 2025-07-30 DIAGNOSIS — Z12.31 VISIT FOR SCREENING MAMMOGRAM: ICD-10-CM

## 2025-07-30 PROCEDURE — 3074F SYST BP LT 130 MM HG: CPT | Mod: CPTII,S$GLB,, | Performed by: FAMILY MEDICINE

## 2025-07-30 PROCEDURE — 1126F AMNT PAIN NOTED NONE PRSNT: CPT | Mod: CPTII,S$GLB,, | Performed by: FAMILY MEDICINE

## 2025-07-30 PROCEDURE — 3078F DIAST BP <80 MM HG: CPT | Mod: CPTII,S$GLB,, | Performed by: FAMILY MEDICINE

## 2025-07-30 PROCEDURE — 1101F PT FALLS ASSESS-DOCD LE1/YR: CPT | Mod: CPTII,S$GLB,, | Performed by: FAMILY MEDICINE

## 2025-07-30 PROCEDURE — 1159F MED LIST DOCD IN RCRD: CPT | Mod: CPTII,S$GLB,, | Performed by: FAMILY MEDICINE

## 2025-07-30 PROCEDURE — 99214 OFFICE O/P EST MOD 30 MIN: CPT | Mod: S$GLB,,, | Performed by: FAMILY MEDICINE

## 2025-07-30 PROCEDURE — 99999 PR PBB SHADOW E&M-EST. PATIENT-LVL III: CPT | Mod: PBBFAC,,, | Performed by: FAMILY MEDICINE

## 2025-07-30 PROCEDURE — 3288F FALL RISK ASSESSMENT DOCD: CPT | Mod: CPTII,S$GLB,, | Performed by: FAMILY MEDICINE

## 2025-07-30 NOTE — PROGRESS NOTES
Dictation #1  MRN:[id number]  CSN:[id number]   Subjective:       Patient ID: Alexandria Estrada is a 75 y.o. female.    Chief Complaint: Follow-up (6 month F/U)      HPI 75 year-old white female in for six-month checkup--eating well--+BM--ambulating well  Hypertension blood pressure 118/74 amlodipine 2.5 mg  GERD on omeprazole p.r.n.  Urinary incontinence on Myrbetriq takes p.r.n.  Has neuropathy left foot on gabapentin  Vaginal atrophy on Estrace vaginal cream  History hepatics cysts/renal cyst has nonobstructive kidney stone  History osteoporosis--unable take fosfamax aggravaqted GERD    falls alot problems with feet        ROS:  Skin: no psoriasis, eczema, skin cancer skin lesion    HEENT: no  HA no ocular pain, blurred vision, diplopia, epistaxis, no hoarseness no change in voice thyroid trouble    Lung: No pneumonia, asthma, Tb, wheezing, SOB, no smoking  Heart: No chest pain, ankle edema, palpitations, MI, elaine murmur, +hypertension blood pressure ,no hyperlipidemia--no stent bypass arrhythmia--positive peripheral vascular disease   Abdomen:  +GERD with certain foodsOK with medication has been able decrease amount of medication No nausea, vomiting,  diarrhea, constipation, ulcers, hepatitis, gallbladder disease, melena, hematochezia, hematemesis  :  Has overactive bladder + hx UTI OK now    history renal stone 20 years ago-on Myrbetriq  GYN -LMP age 50 last mammogram due after August 21st  MS: no fractures, + O/A--needs hands severe scoliosis lumbar spine, right heel --nocturnal leg cramps OK --as pain in the left lower leg lateral aspect and left heel--neuropathy especially left plantar area history osteoporosis  Neuro:  No dizziness,no  LOC, seizures   No diabetes, no anemia,no  anxiety, no depression  , 2 daughters, work retired ,  lives with    Objective:   Physical Exam:   General: Well nourished, well developed, no acute distress +thin  Skin: No lesions  HEENT: Eyes PERRLA, EOM intact,  nose clear  throat nonerythematous ears clear fluid  NECK: Supple, no bruits, No JVD, no nodes  Lungs: Clear, no rales, rhonchi, wheezing   Heart: Regular rate and rhythm, no murmurs, gallops, or rubs  Abdomen: flat, bowel sounds positive, no tenderness, or organomegaly some pressure in suprapubic area and groin area  MS:   pain right  knee -- crepitus with flexion and extension of the knee-- no instability to the j-oint--- able to squat jail down able to arise without difficulty  Neuro: Alert, CN intact, oriented X 3  Extremities: No cyanosis, clubbing, or edema         Assessment:       1. HTN (hypertension), benign    2. Osteoarthritis of ankle and foot, unspecified laterality    3. Peripheral vascular disease    4. Osteoarthritis of right knee, unspecified osteoarthritis type    5. Scoliosis of lumbosacral spine, unspecified scoliosis type    6. Gastroesophageal reflux disease without esophagitis    7. History of nephrolithiasis    8. Overactive bladder    9. Lumbosacral strain, subsequent encounter                        Plan:       HTN (hypertension), benign    Osteoarthritis of ankle and foot, unspecified laterality    Peripheral vascular disease    Osteoarthritis of right knee, unspecified osteoarthritis type    Scoliosis of lumbosacral spine, unspecified scoliosis type    Gastroesophageal reflux disease without esophagitis    History of nephrolithiasis    Overactive bladder    Lumbosacral strain, subsequent encounter                        Main Reason for Visit  History recurrent UTIs on Myrbetriq 80 urinary incontinence and bladder spasms history of renal cyst hepatics cyst nephrolithiasis nonobstructing  Scoliosis --still back pain on diclofenac   Back pain--severe scoliosis thoracolumbar spine--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 diclofenac 50 mg b.i.d  History neuropathy left foot on gabapentin   Hypertension blood pressure was 142/80 now  amlodipine 2.5 qd-- low Na diet--do daily blood pressure checks 1 blood pressure to be 139/89 or less than greater than 90/60   GERD on omeprazole --doing well   History osteoporosis on Fosamax--hx GI issues so fosfamax stopped   Severe scoliosis/restless leg syndrome/history nephrolithiasis  Lab  CBC cMP lipid  T4 TSH  in 6 mo   Return 6 mo     Health maintenance

## 2025-08-27 ENCOUNTER — HOSPITAL ENCOUNTER (OUTPATIENT)
Dept: RADIOLOGY | Facility: HOSPITAL | Age: 75
Discharge: HOME | End: 2025-08-27
Attending: FAMILY MEDICINE

## 2025-08-27 DIAGNOSIS — Z12.31 VISIT FOR SCREENING MAMMOGRAM: ICD-10-CM

## 2025-08-27 PROCEDURE — 77063 BREAST TOMOSYNTHESIS BI: CPT | Mod: TC

## (undated) DEVICE — KIT ENDOKIT EGD/COLON/ERCP

## (undated) DEVICE — UNDERPAD ULTRASORB 300LB 30X36

## (undated) DEVICE — SPONGE COTTON TRAY 4X4IN

## (undated) DEVICE — GLOVE SENSICARE PI ALOE 7.5

## (undated) DEVICE — SET CYSTO IRR DRP CHMBR 84IN